# Patient Record
Sex: MALE | Race: WHITE | Employment: OTHER | URBAN - NONMETROPOLITAN AREA
[De-identification: names, ages, dates, MRNs, and addresses within clinical notes are randomized per-mention and may not be internally consistent; named-entity substitution may affect disease eponyms.]

---

## 2018-06-05 ENCOUNTER — HOSPITAL ENCOUNTER (INPATIENT)
Age: 68
LOS: 2 days | Discharge: HOME | DRG: 249 | End: 2018-06-07
Attending: INTERNAL MEDICINE | Admitting: INTERNAL MEDICINE
Payer: MEDICARE

## 2018-06-05 PROBLEM — I21.3 STEMI (ST ELEVATION MYOCARDIAL INFARCTION) (HCC): Status: ACTIVE | Noted: 2018-06-05

## 2018-06-05 PROBLEM — I25.10 CAD (CORONARY ARTERY DISEASE): Status: ACTIVE | Noted: 2018-06-05

## 2018-06-05 LAB
ANION GAP SERPL CALCULATED.3IONS-SCNC: 13 MMOL/L (ref 7–19)
BUN BLDV-MCNC: 13 MG/DL (ref 8–23)
CALCIUM SERPL-MCNC: 8.2 MG/DL (ref 8.8–10.2)
CHLORIDE BLD-SCNC: 98 MMOL/L (ref 98–111)
CO2: 27 MMOL/L (ref 22–29)
CREAT SERPL-MCNC: 0.9 MG/DL (ref 0.5–1.2)
GFR NON-AFRICAN AMERICAN: >60
GLUCOSE BLD-MCNC: 96 MG/DL (ref 74–109)
HCT VFR BLD CALC: 47.7 % (ref 42–52)
HEMOGLOBIN: 15.8 G/DL (ref 14–18)
MCH RBC QN AUTO: 30.6 PG (ref 27–31)
MCHC RBC AUTO-ENTMCNC: 33.1 G/DL (ref 33–37)
MCV RBC AUTO: 92.4 FL (ref 80–94)
PDW BLD-RTO: 16.8 % (ref 11.5–14.5)
PLATELET # BLD: 245 K/UL (ref 130–400)
PMV BLD AUTO: 8.9 FL (ref 9.4–12.4)
POTASSIUM SERPL-SCNC: 4.4 MMOL/L (ref 3.5–5)
RBC # BLD: 5.16 M/UL (ref 4.7–6.1)
SODIUM BLD-SCNC: 138 MMOL/L (ref 136–145)
TROPONIN: 0.3 NG/ML (ref 0–0.03)
TROPONIN: 0.38 NG/ML (ref 0–0.03)
TROPONIN: 0.5 NG/ML (ref 0–0.03)
WBC # BLD: 7.2 K/UL (ref 4.8–10.8)

## 2018-06-05 PROCEDURE — 6370000000 HC RX 637 (ALT 250 FOR IP): Performed by: INTERNAL MEDICINE

## 2018-06-05 PROCEDURE — 6360000002 HC RX W HCPCS

## 2018-06-05 PROCEDURE — 36415 COLL VENOUS BLD VENIPUNCTURE: CPT

## 2018-06-05 PROCEDURE — 85027 COMPLETE CBC AUTOMATED: CPT

## 2018-06-05 PROCEDURE — 2100000000 HC CCU R&B

## 2018-06-05 PROCEDURE — 02703EZ DILATION OF CORONARY ARTERY, ONE ARTERY WITH TWO INTRALUMINAL DEVICES, PERCUTANEOUS APPROACH: ICD-10-PCS | Performed by: INTERNAL MEDICINE

## 2018-06-05 PROCEDURE — 4A023N7 MEASUREMENT OF CARDIAC SAMPLING AND PRESSURE, LEFT HEART, PERCUTANEOUS APPROACH: ICD-10-PCS | Performed by: INTERNAL MEDICINE

## 2018-06-05 PROCEDURE — 87070 CULTURE OTHR SPECIMN AEROBIC: CPT

## 2018-06-05 PROCEDURE — 92941 PRQ TRLML REVSC TOT OCCL AMI: CPT | Performed by: INTERNAL MEDICINE

## 2018-06-05 PROCEDURE — B2111ZZ FLUOROSCOPY OF MULTIPLE CORONARY ARTERIES USING LOW OSMOLAR CONTRAST: ICD-10-PCS | Performed by: INTERNAL MEDICINE

## 2018-06-05 PROCEDURE — 6370000000 HC RX 637 (ALT 250 FOR IP)

## 2018-06-05 PROCEDURE — 6360000002 HC RX W HCPCS: Performed by: INTERNAL MEDICINE

## 2018-06-05 PROCEDURE — 94640 AIRWAY INHALATION TREATMENT: CPT

## 2018-06-05 PROCEDURE — 2700000000 HC OXYGEN THERAPY PER DAY

## 2018-06-05 PROCEDURE — 3E033PZ INTRODUCTION OF PLATELET INHIBITOR INTO PERIPHERAL VEIN, PERCUTANEOUS APPROACH: ICD-10-PCS | Performed by: INTERNAL MEDICINE

## 2018-06-05 PROCEDURE — 93005 ELECTROCARDIOGRAM TRACING: CPT

## 2018-06-05 PROCEDURE — 84484 ASSAY OF TROPONIN QUANT: CPT

## 2018-06-05 PROCEDURE — 2580000003 HC RX 258: Performed by: INTERNAL MEDICINE

## 2018-06-05 PROCEDURE — B2151ZZ FLUOROSCOPY OF LEFT HEART USING LOW OSMOLAR CONTRAST: ICD-10-PCS | Performed by: INTERNAL MEDICINE

## 2018-06-05 PROCEDURE — B41F1ZZ FLUOROSCOPY OF RIGHT LOWER EXTREMITY ARTERIES USING LOW OSMOLAR CONTRAST: ICD-10-PCS | Performed by: INTERNAL MEDICINE

## 2018-06-05 PROCEDURE — 93458 L HRT ARTERY/VENTRICLE ANGIO: CPT | Performed by: INTERNAL MEDICINE

## 2018-06-05 PROCEDURE — 80048 BASIC METABOLIC PNL TOTAL CA: CPT

## 2018-06-05 RX ORDER — ATORVASTATIN CALCIUM 80 MG/1
80 TABLET, FILM COATED ORAL NIGHTLY
Status: DISCONTINUED | OUTPATIENT
Start: 2018-06-05 | End: 2018-06-07 | Stop reason: HOSPADM

## 2018-06-05 RX ORDER — PRASUGREL 5 MG/1
5 TABLET, FILM COATED ORAL DAILY
Status: DISCONTINUED | OUTPATIENT
Start: 2018-06-05 | End: 2018-06-05

## 2018-06-05 RX ORDER — NICOTINE 21 MG/24HR
1 PATCH, TRANSDERMAL 24 HOURS TRANSDERMAL DAILY
Status: DISCONTINUED | OUTPATIENT
Start: 2018-06-05 | End: 2018-06-07 | Stop reason: HOSPADM

## 2018-06-05 RX ORDER — ACETAMINOPHEN 325 MG/1
650 TABLET ORAL EVERY 4 HOURS PRN
Status: DISCONTINUED | OUTPATIENT
Start: 2018-06-05 | End: 2018-06-07 | Stop reason: HOSPADM

## 2018-06-05 RX ORDER — ONDANSETRON 2 MG/ML
4 INJECTION INTRAMUSCULAR; INTRAVENOUS EVERY 6 HOURS PRN
Status: DISCONTINUED | OUTPATIENT
Start: 2018-06-05 | End: 2018-06-07 | Stop reason: HOSPADM

## 2018-06-05 RX ORDER — IPRATROPIUM BROMIDE AND ALBUTEROL SULFATE 2.5; .5 MG/3ML; MG/3ML
1 SOLUTION RESPIRATORY (INHALATION)
Status: DISCONTINUED | OUTPATIENT
Start: 2018-06-05 | End: 2018-06-07 | Stop reason: HOSPADM

## 2018-06-05 RX ORDER — PRASUGREL 10 MG/1
10 TABLET, FILM COATED ORAL DAILY
Status: DISCONTINUED | OUTPATIENT
Start: 2018-06-05 | End: 2018-06-07 | Stop reason: HOSPADM

## 2018-06-05 RX ORDER — SODIUM CHLORIDE 9 MG/ML
INJECTION, SOLUTION INTRAVENOUS CONTINUOUS
Status: ACTIVE | OUTPATIENT
Start: 2018-06-05 | End: 2018-06-05

## 2018-06-05 RX ORDER — ONDANSETRON 2 MG/ML
4 INJECTION INTRAMUSCULAR; INTRAVENOUS EVERY 6 HOURS PRN
Status: DISCONTINUED | OUTPATIENT
Start: 2018-06-05 | End: 2018-06-05 | Stop reason: SDUPTHER

## 2018-06-05 RX ORDER — ASPIRIN 81 MG/1
81 TABLET, CHEWABLE ORAL DAILY
Status: DISCONTINUED | OUTPATIENT
Start: 2018-06-05 | End: 2018-06-07 | Stop reason: HOSPADM

## 2018-06-05 RX ORDER — ASPIRIN 81 MG/1
81 TABLET, CHEWABLE ORAL DAILY
Status: DISCONTINUED | OUTPATIENT
Start: 2018-06-06 | End: 2018-06-05 | Stop reason: SDUPTHER

## 2018-06-05 RX ORDER — PRASUGREL 10 MG/1
10 TABLET, FILM COATED ORAL DAILY
Status: DISCONTINUED | OUTPATIENT
Start: 2018-06-06 | End: 2018-06-05

## 2018-06-05 RX ORDER — METOPROLOL SUCCINATE 25 MG/1
25 TABLET, EXTENDED RELEASE ORAL DAILY
Status: DISCONTINUED | OUTPATIENT
Start: 2018-06-05 | End: 2018-06-07 | Stop reason: HOSPADM

## 2018-06-05 RX ORDER — LISINOPRIL 5 MG/1
5 TABLET ORAL DAILY
Status: DISCONTINUED | OUTPATIENT
Start: 2018-06-05 | End: 2018-06-07 | Stop reason: HOSPADM

## 2018-06-05 RX ADMIN — ASPIRIN 81 MG CHEWABLE TABLET 81 MG: 81 TABLET CHEWABLE at 09:23

## 2018-06-05 RX ADMIN — BIVALIRUDIN 1 MG/KG/HR: 250 INJECTION, POWDER, LYOPHILIZED, FOR SOLUTION INTRAVENOUS at 05:15

## 2018-06-05 RX ADMIN — PRASUGREL 10 MG: 10 TABLET, FILM COATED ORAL at 09:23

## 2018-06-05 RX ADMIN — METOPROLOL SUCCINATE 25 MG: 25 TABLET, EXTENDED RELEASE ORAL at 09:23

## 2018-06-05 RX ADMIN — IPRATROPIUM BROMIDE AND ALBUTEROL SULFATE 1 AMPULE: .5; 3 SOLUTION RESPIRATORY (INHALATION) at 19:49

## 2018-06-05 RX ADMIN — ATORVASTATIN CALCIUM 80 MG: 80 TABLET, FILM COATED ORAL at 20:33

## 2018-06-05 RX ADMIN — IPRATROPIUM BROMIDE AND ALBUTEROL SULFATE 1 AMPULE: .5; 3 SOLUTION RESPIRATORY (INHALATION) at 16:57

## 2018-06-05 RX ADMIN — ENOXAPARIN SODIUM 40 MG: 100 INJECTION SUBCUTANEOUS at 09:24

## 2018-06-05 RX ADMIN — LISINOPRIL 5 MG: 5 TABLET ORAL at 09:23

## 2018-06-05 ASSESSMENT — PAIN SCALES - GENERAL
PAINLEVEL_OUTOF10: 0

## 2018-06-06 LAB
EKG P AXIS: 68 DEGREES
EKG P-R INTERVAL: 170 MS
EKG Q-T INTERVAL: 414 MS
EKG QRS DURATION: 102 MS
EKG QTC CALCULATION (BAZETT): 440 MS
EKG T AXIS: 74 DEGREES
MRSA CULTURE ONLY: NORMAL

## 2018-06-06 PROCEDURE — 94640 AIRWAY INHALATION TREATMENT: CPT

## 2018-06-06 PROCEDURE — 99233 SBSQ HOSP IP/OBS HIGH 50: CPT | Performed by: INTERNAL MEDICINE

## 2018-06-06 PROCEDURE — 6360000002 HC RX W HCPCS: Performed by: INTERNAL MEDICINE

## 2018-06-06 PROCEDURE — 6370000000 HC RX 637 (ALT 250 FOR IP): Performed by: INTERNAL MEDICINE

## 2018-06-06 PROCEDURE — 2100000000 HC CCU R&B

## 2018-06-06 PROCEDURE — 2700000000 HC OXYGEN THERAPY PER DAY

## 2018-06-06 RX ADMIN — LISINOPRIL 5 MG: 5 TABLET ORAL at 08:42

## 2018-06-06 RX ADMIN — IPRATROPIUM BROMIDE AND ALBUTEROL SULFATE 1 AMPULE: .5; 3 SOLUTION RESPIRATORY (INHALATION) at 06:15

## 2018-06-06 RX ADMIN — IPRATROPIUM BROMIDE AND ALBUTEROL SULFATE 1 AMPULE: .5; 3 SOLUTION RESPIRATORY (INHALATION) at 14:24

## 2018-06-06 RX ADMIN — ATORVASTATIN CALCIUM 80 MG: 80 TABLET, FILM COATED ORAL at 20:04

## 2018-06-06 RX ADMIN — IPRATROPIUM BROMIDE AND ALBUTEROL SULFATE 1 AMPULE: .5; 3 SOLUTION RESPIRATORY (INHALATION) at 19:45

## 2018-06-06 RX ADMIN — IPRATROPIUM BROMIDE AND ALBUTEROL SULFATE 1 AMPULE: .5; 3 SOLUTION RESPIRATORY (INHALATION) at 10:10

## 2018-06-06 RX ADMIN — ASPIRIN 81 MG CHEWABLE TABLET 81 MG: 81 TABLET CHEWABLE at 08:42

## 2018-06-06 RX ADMIN — ENOXAPARIN SODIUM 40 MG: 100 INJECTION SUBCUTANEOUS at 08:42

## 2018-06-06 RX ADMIN — METOPROLOL SUCCINATE 25 MG: 25 TABLET, EXTENDED RELEASE ORAL at 08:42

## 2018-06-06 RX ADMIN — PRASUGREL 10 MG: 10 TABLET, FILM COATED ORAL at 08:42

## 2018-06-06 ASSESSMENT — PAIN SCALES - GENERAL
PAINLEVEL_OUTOF10: 0

## 2018-06-07 VITALS
RESPIRATION RATE: 26 BRPM | WEIGHT: 186.2 LBS | TEMPERATURE: 97.2 F | HEIGHT: 72 IN | HEART RATE: 69 BPM | OXYGEN SATURATION: 94 % | BODY MASS INDEX: 25.22 KG/M2 | SYSTOLIC BLOOD PRESSURE: 116 MMHG | DIASTOLIC BLOOD PRESSURE: 76 MMHG

## 2018-06-07 LAB
CHOLESTEROL, TOTAL: 181 MG/DL (ref 160–199)
HDLC SERPL-MCNC: 50 MG/DL (ref 55–121)
LDL CHOLESTEROL CALCULATED: 111 MG/DL
TRIGL SERPL-MCNC: 98 MG/DL (ref 0–149)

## 2018-06-07 PROCEDURE — 36415 COLL VENOUS BLD VENIPUNCTURE: CPT

## 2018-06-07 PROCEDURE — 99238 HOSP IP/OBS DSCHRG MGMT 30/<: CPT | Performed by: INTERNAL MEDICINE

## 2018-06-07 PROCEDURE — 6360000002 HC RX W HCPCS: Performed by: INTERNAL MEDICINE

## 2018-06-07 PROCEDURE — 94640 AIRWAY INHALATION TREATMENT: CPT

## 2018-06-07 PROCEDURE — 6370000000 HC RX 637 (ALT 250 FOR IP): Performed by: INTERNAL MEDICINE

## 2018-06-07 PROCEDURE — 80061 LIPID PANEL: CPT

## 2018-06-07 RX ORDER — NICOTINE 21 MG/24HR
1 PATCH, TRANSDERMAL 24 HOURS TRANSDERMAL DAILY
Qty: 30 PATCH | Refills: 3 | Status: SHIPPED | OUTPATIENT
Start: 2018-06-08 | End: 2018-11-12

## 2018-06-07 RX ORDER — LISINOPRIL 5 MG/1
5 TABLET ORAL DAILY
Qty: 30 TABLET | Refills: 3 | Status: SHIPPED | OUTPATIENT
Start: 2018-06-08 | End: 2018-08-28 | Stop reason: SDUPTHER

## 2018-06-07 RX ORDER — METOPROLOL SUCCINATE 25 MG/1
25 TABLET, EXTENDED RELEASE ORAL DAILY
Qty: 30 TABLET | Refills: 3 | Status: SHIPPED | OUTPATIENT
Start: 2018-06-08 | End: 2018-08-28 | Stop reason: SDUPTHER

## 2018-06-07 RX ORDER — ASPIRIN 81 MG/1
81 TABLET, CHEWABLE ORAL DAILY
Qty: 30 TABLET | Refills: 3 | Status: SHIPPED | OUTPATIENT
Start: 2018-06-08

## 2018-06-07 RX ORDER — ATORVASTATIN CALCIUM 80 MG/1
80 TABLET, FILM COATED ORAL NIGHTLY
Qty: 30 TABLET | Refills: 3 | Status: SHIPPED | OUTPATIENT
Start: 2018-06-07 | End: 2018-08-28 | Stop reason: SDUPTHER

## 2018-06-07 RX ORDER — PRASUGREL 10 MG/1
10 TABLET, FILM COATED ORAL DAILY
Qty: 30 TABLET | Refills: 2 | Status: SHIPPED | OUTPATIENT
Start: 2018-06-08 | End: 2018-08-28 | Stop reason: SDUPTHER

## 2018-06-07 RX ORDER — IPRATROPIUM BROMIDE AND ALBUTEROL SULFATE 2.5; .5 MG/3ML; MG/3ML
3 SOLUTION RESPIRATORY (INHALATION)
Qty: 360 ML | Refills: 2 | Status: SHIPPED | OUTPATIENT
Start: 2018-06-07

## 2018-06-07 RX ADMIN — IPRATROPIUM BROMIDE AND ALBUTEROL SULFATE 1 AMPULE: .5; 3 SOLUTION RESPIRATORY (INHALATION) at 10:48

## 2018-06-07 RX ADMIN — ENOXAPARIN SODIUM 40 MG: 100 INJECTION SUBCUTANEOUS at 10:33

## 2018-06-07 RX ADMIN — IPRATROPIUM BROMIDE AND ALBUTEROL SULFATE 1 AMPULE: .5; 3 SOLUTION RESPIRATORY (INHALATION) at 14:58

## 2018-06-07 RX ADMIN — LISINOPRIL 5 MG: 5 TABLET ORAL at 10:34

## 2018-06-07 RX ADMIN — ASPIRIN 81 MG CHEWABLE TABLET 81 MG: 81 TABLET CHEWABLE at 10:34

## 2018-06-07 RX ADMIN — METOPROLOL SUCCINATE 25 MG: 25 TABLET, EXTENDED RELEASE ORAL at 10:34

## 2018-06-07 RX ADMIN — PRASUGREL 10 MG: 10 TABLET, FILM COATED ORAL at 10:34

## 2018-06-07 RX ADMIN — IPRATROPIUM BROMIDE AND ALBUTEROL SULFATE 1 AMPULE: .5; 3 SOLUTION RESPIRATORY (INHALATION) at 07:05

## 2018-06-07 ASSESSMENT — PAIN SCALES - GENERAL
PAINLEVEL_OUTOF10: 0
PAINLEVEL_OUTOF10: 0

## 2018-06-08 LAB
LV EF: 40 %
LVEF MODALITY: NORMAL

## 2018-06-27 ENCOUNTER — OFFICE VISIT (OUTPATIENT)
Dept: CARDIOLOGY | Age: 68
End: 2018-06-27
Payer: MEDICARE

## 2018-06-27 VITALS
SYSTOLIC BLOOD PRESSURE: 112 MMHG | HEIGHT: 72 IN | HEART RATE: 82 BPM | BODY MASS INDEX: 25.47 KG/M2 | DIASTOLIC BLOOD PRESSURE: 78 MMHG | WEIGHT: 188 LBS

## 2018-06-27 DIAGNOSIS — I25.110 CORONARY ARTERY DISEASE INVOLVING NATIVE HEART WITH UNSTABLE ANGINA PECTORIS, UNSPECIFIED VESSEL OR LESION TYPE (HCC): Primary | ICD-10-CM

## 2018-06-27 PROCEDURE — 3017F COLORECTAL CA SCREEN DOC REV: CPT | Performed by: NURSE PRACTITIONER

## 2018-06-27 PROCEDURE — G8419 CALC BMI OUT NRM PARAM NOF/U: HCPCS | Performed by: NURSE PRACTITIONER

## 2018-06-27 PROCEDURE — 4040F PNEUMOC VAC/ADMIN/RCVD: CPT | Performed by: NURSE PRACTITIONER

## 2018-06-27 PROCEDURE — 93000 ELECTROCARDIOGRAM COMPLETE: CPT | Performed by: NURSE PRACTITIONER

## 2018-06-27 PROCEDURE — G8598 ASA/ANTIPLAT THER USED: HCPCS | Performed by: NURSE PRACTITIONER

## 2018-06-27 PROCEDURE — 99214 OFFICE O/P EST MOD 30 MIN: CPT | Performed by: NURSE PRACTITIONER

## 2018-06-27 PROCEDURE — 4004F PT TOBACCO SCREEN RCVD TLK: CPT | Performed by: NURSE PRACTITIONER

## 2018-06-27 PROCEDURE — G8427 DOCREV CUR MEDS BY ELIG CLIN: HCPCS | Performed by: NURSE PRACTITIONER

## 2018-06-27 PROCEDURE — 1111F DSCHRG MED/CURRENT MED MERGE: CPT | Performed by: NURSE PRACTITIONER

## 2018-06-27 PROCEDURE — 1123F ACP DISCUSS/DSCN MKR DOCD: CPT | Performed by: NURSE PRACTITIONER

## 2018-06-27 RX ORDER — NITROGLYCERIN 0.4 MG/1
0.4 TABLET SUBLINGUAL EVERY 5 MIN PRN
Qty: 25 TABLET | Refills: 3 | Status: SHIPPED | OUTPATIENT
Start: 2018-06-27 | End: 2018-08-28 | Stop reason: SDUPTHER

## 2018-07-06 ENCOUNTER — HOSPITAL ENCOUNTER (OUTPATIENT)
Dept: PULMONOLOGY | Age: 68
Discharge: HOME | End: 2018-07-06
Payer: MEDICARE

## 2018-07-06 ENCOUNTER — TELEPHONE (OUTPATIENT)
Dept: CARDIOLOGY | Age: 68
End: 2018-07-06

## 2018-07-06 DIAGNOSIS — I25.110 CORONARY ARTERY DISEASE INVOLVING NATIVE HEART WITH UNSTABLE ANGINA PECTORIS, UNSPECIFIED VESSEL OR LESION TYPE (HCC): ICD-10-CM

## 2018-07-06 LAB
BASE EXCESS ARTERIAL: -2.1 MMOL/L (ref -2–2)
CARBOXYHEMOGLOBIN ARTERIAL: 12.5 % (ref 0–5)
HCO3 ARTERIAL: 21.2 MMOL/L (ref 22–26)
HEMOGLOBIN, ART, EXTENDED: 15.8 G/DL (ref 14–18)
METHEMOGLOBIN ARTERIAL: 1.3 %
O2 CONTENT ARTERIAL: 18.9 ML/DL
O2 SAT, ARTERIAL: 84.8 %
O2 THERAPY: ABNORMAL
PCO2 ARTERIAL: 32 MMHG (ref 35–45)
PH ARTERIAL: 7.43 (ref 7.35–7.45)
PO2 ARTERIAL: 85 MMHG (ref 80–100)
POTASSIUM, WHOLE BLOOD: 4.5

## 2018-07-06 PROCEDURE — 36600 WITHDRAWAL OF ARTERIAL BLOOD: CPT

## 2018-07-06 PROCEDURE — 94727 GAS DIL/WSHOT DETER LNG VOL: CPT

## 2018-07-06 PROCEDURE — 94729 DIFFUSING CAPACITY: CPT

## 2018-07-06 PROCEDURE — 6360000002 HC RX W HCPCS: Performed by: NURSE PRACTITIONER

## 2018-07-06 PROCEDURE — 84132 ASSAY OF SERUM POTASSIUM: CPT

## 2018-07-06 PROCEDURE — 82803 BLOOD GASES ANY COMBINATION: CPT

## 2018-07-06 PROCEDURE — 94060 EVALUATION OF WHEEZING: CPT

## 2018-07-06 RX ORDER — ALBUTEROL SULFATE 2.5 MG/3ML
2.5 SOLUTION RESPIRATORY (INHALATION) EVERY 6 HOURS PRN
Status: DISCONTINUED | OUTPATIENT
Start: 2018-07-06 | End: 2018-07-08 | Stop reason: HOSPADM

## 2018-07-06 RX ADMIN — ALBUTEROL SULFATE 2.5 MG: 2.5 SOLUTION RESPIRATORY (INHALATION) at 09:46

## 2018-07-06 NOTE — TELEPHONE ENCOUNTER
[facility] called with abnormal ABG results that genoveva casey order. ABG-84.8, carboxy 12.5 --normal is less than 5. Lab is holding pt till they hear from you.  Lab number is [phone]

## 2018-07-08 NOTE — PROCEDURES
[facility] [address], [facility]                                PULMONARY FUNCTION    PATIENT NAME: Mary Norman               :        1950  MED REC NO:   [id number]                              ROOM:  ACCOUNT NO:   [de-identified]                           ADMIT DATE: 2018  PROVIDER:     Shravan Woods MD    DATE OF PROCEDURE:  2018    PULMONARY FUNCTION TESTS:  1. Spirometry shows severe airflow obstruction. 2.  Following bronchodilator, there is a significant percentage increase,  but insignificant absolute value increased in the FEV1 to 25% predicted  leaving residual, very severe airflow obstruction. 3.  Mid flows are severely reduced. 4.  Maximum ventilation is reduced. 5.  Lung volume measurements show mild reduction in total lung capacity and  vital capacity consistent with coexisting restrictive process, an elevation  of residual volume relative to these suggesting some gas trapping. 6.  Diffusion capacity is severely reduced.         Sarah Olivares MD    D: 2018 11:53:29      T: 2018 14:34:43     [id number]  Job#: [id number]     Doc#: [id number]    CC:

## 2018-07-09 DIAGNOSIS — R94.2 ABNORMAL PFT: Primary | ICD-10-CM

## 2018-07-10 ENCOUNTER — OFFICE VISIT (OUTPATIENT)
Dept: CARDIOTHORACIC SURGERY | Age: 68
End: 2018-07-10
Payer: MEDICARE

## 2018-07-10 VITALS
BODY MASS INDEX: 25.33 KG/M2 | SYSTOLIC BLOOD PRESSURE: 126 MMHG | HEIGHT: 72 IN | WEIGHT: 187 LBS | HEART RATE: 86 BPM | DIASTOLIC BLOOD PRESSURE: 80 MMHG | OXYGEN SATURATION: 99 %

## 2018-07-10 DIAGNOSIS — I25.10 ATHEROSCLEROSIS OF NATIVE CORONARY ARTERY OF NATIVE HEART WITHOUT ANGINA PECTORIS: Primary | ICD-10-CM

## 2018-07-10 PROCEDURE — 3017F COLORECTAL CA SCREEN DOC REV: CPT | Performed by: THORACIC SURGERY (CARDIOTHORACIC VASCULAR SURGERY)

## 2018-07-10 PROCEDURE — 1101F PT FALLS ASSESS-DOCD LE1/YR: CPT | Performed by: THORACIC SURGERY (CARDIOTHORACIC VASCULAR SURGERY)

## 2018-07-10 PROCEDURE — 99204 OFFICE O/P NEW MOD 45 MIN: CPT | Performed by: THORACIC SURGERY (CARDIOTHORACIC VASCULAR SURGERY)

## 2018-07-10 PROCEDURE — G8427 DOCREV CUR MEDS BY ELIG CLIN: HCPCS | Performed by: THORACIC SURGERY (CARDIOTHORACIC VASCULAR SURGERY)

## 2018-07-10 PROCEDURE — 1123F ACP DISCUSS/DSCN MKR DOCD: CPT | Performed by: THORACIC SURGERY (CARDIOTHORACIC VASCULAR SURGERY)

## 2018-07-10 PROCEDURE — G8419 CALC BMI OUT NRM PARAM NOF/U: HCPCS | Performed by: THORACIC SURGERY (CARDIOTHORACIC VASCULAR SURGERY)

## 2018-07-10 PROCEDURE — 4004F PT TOBACCO SCREEN RCVD TLK: CPT | Performed by: THORACIC SURGERY (CARDIOTHORACIC VASCULAR SURGERY)

## 2018-07-10 PROCEDURE — 4040F PNEUMOC VAC/ADMIN/RCVD: CPT | Performed by: THORACIC SURGERY (CARDIOTHORACIC VASCULAR SURGERY)

## 2018-07-10 PROCEDURE — G8598 ASA/ANTIPLAT THER USED: HCPCS | Performed by: THORACIC SURGERY (CARDIOTHORACIC VASCULAR SURGERY)

## 2018-07-11 NOTE — PROGRESS NOTES
Date    CAD (coronary artery disease)     COPD (chronic obstructive pulmonary disease) (HCC)      Past Surgical History:   Procedure Laterality Date    APPENDECTOMY      BACK SURGERY      COLONOSCOPY      ENDOSCOPY, COLON, DIAGNOSTIC       Current Outpatient Prescriptions   Medication Sig Dispense Refill    nitroGLYCERIN (NITROSTAT) 0.4 MG SL tablet Place 1 tablet under the tongue every 5 minutes as needed for Chest pain 25 tablet 3    aspirin 81 MG chewable tablet Take 1 tablet by mouth daily 30 tablet 3    ipratropium-albuterol (DUONEB) 0.5-2.5 (3) MG/3ML SOLN nebulizer solution Inhale 3 mLs into the lungs every 4 hours (while awake) 360 mL 2    atorvastatin (LIPITOR) 80 MG tablet Take 1 tablet by mouth nightly 30 tablet 3    lisinopril (PRINIVIL;ZESTRIL) 5 MG tablet Take 1 tablet by mouth daily 30 tablet 3    metoprolol succinate (TOPROL XL) 25 MG extended release tablet Take 1 tablet by mouth daily 30 tablet 3    prasugrel (EFFIENT) 10 MG TABS Take 1 tablet by mouth daily 30 tablet 2    nicotine (NICODERM CQ) 21 MG/24HR Place 1 patch onto the skin daily 30 patch 3     No current facility-administered medications for this visit. No Known Allergies  Family History   Problem Relation Age of Onset    Cancer Sister     Cancer Brother      Social History     Social History    Marital status:      Spouse name: N/A    Number of children: N/A    Years of education: N/A     Occupational History    Not on file. Social History Main Topics    Smoking status: Current Every Day Smoker     Packs/day: 1.00     Years: 50.00     Types: Cigarettes    Smokeless tobacco: Never Used    Alcohol use Yes      Comment: occassional    Drug use: No    Sexual activity: Not on file     Other Topics Concern    Not on file     Social History Narrative    No narrative on file         ROS:   HEENT denies neck pain, sore throat, blurred vision, diplopia.     Respiratory chronic shortness of breath, worse

## 2018-08-28 ENCOUNTER — OFFICE VISIT (OUTPATIENT)
Dept: CARDIOLOGY | Age: 68
End: 2018-08-28
Payer: MEDICARE

## 2018-08-28 VITALS
BODY MASS INDEX: 26.14 KG/M2 | SYSTOLIC BLOOD PRESSURE: 118 MMHG | HEIGHT: 72 IN | WEIGHT: 193 LBS | DIASTOLIC BLOOD PRESSURE: 80 MMHG | HEART RATE: 80 BPM

## 2018-08-28 DIAGNOSIS — I21.01 ST ELEVATION MYOCARDIAL INFARCTION INVOLVING LEFT MAIN CORONARY ARTERY (HCC): Primary | ICD-10-CM

## 2018-08-28 DIAGNOSIS — I25.10 CORONARY ARTERY DISEASE INVOLVING NATIVE CORONARY ARTERY OF NATIVE HEART WITHOUT ANGINA PECTORIS: ICD-10-CM

## 2018-08-28 PROCEDURE — G8419 CALC BMI OUT NRM PARAM NOF/U: HCPCS | Performed by: NURSE PRACTITIONER

## 2018-08-28 PROCEDURE — G8598 ASA/ANTIPLAT THER USED: HCPCS | Performed by: NURSE PRACTITIONER

## 2018-08-28 PROCEDURE — 3017F COLORECTAL CA SCREEN DOC REV: CPT | Performed by: NURSE PRACTITIONER

## 2018-08-28 PROCEDURE — 1101F PT FALLS ASSESS-DOCD LE1/YR: CPT | Performed by: NURSE PRACTITIONER

## 2018-08-28 PROCEDURE — 99213 OFFICE O/P EST LOW 20 MIN: CPT | Performed by: NURSE PRACTITIONER

## 2018-08-28 PROCEDURE — 1123F ACP DISCUSS/DSCN MKR DOCD: CPT | Performed by: NURSE PRACTITIONER

## 2018-08-28 PROCEDURE — 4040F PNEUMOC VAC/ADMIN/RCVD: CPT | Performed by: NURSE PRACTITIONER

## 2018-08-28 PROCEDURE — 4004F PT TOBACCO SCREEN RCVD TLK: CPT | Performed by: NURSE PRACTITIONER

## 2018-08-28 PROCEDURE — G8427 DOCREV CUR MEDS BY ELIG CLIN: HCPCS | Performed by: NURSE PRACTITIONER

## 2018-08-28 RX ORDER — METOPROLOL SUCCINATE 25 MG/1
25 TABLET, EXTENDED RELEASE ORAL DAILY
Qty: 30 TABLET | Refills: 3 | Status: SHIPPED | OUTPATIENT
Start: 2018-08-28 | End: 2018-12-20 | Stop reason: SDUPTHER

## 2018-08-28 RX ORDER — LISINOPRIL 5 MG/1
5 TABLET ORAL DAILY
Qty: 30 TABLET | Refills: 3 | Status: SHIPPED | OUTPATIENT
Start: 2018-08-28 | End: 2018-12-20 | Stop reason: SDUPTHER

## 2018-08-28 RX ORDER — PRASUGREL 10 MG/1
10 TABLET, FILM COATED ORAL DAILY
Qty: 30 TABLET | Refills: 2 | Status: SHIPPED | OUTPATIENT
Start: 2018-08-28 | End: 2018-11-12 | Stop reason: SDUPTHER

## 2018-08-28 RX ORDER — ATORVASTATIN CALCIUM 80 MG/1
80 TABLET, FILM COATED ORAL NIGHTLY
Qty: 30 TABLET | Refills: 3 | Status: SHIPPED | OUTPATIENT
Start: 2018-08-28 | End: 2018-12-20 | Stop reason: SDUPTHER

## 2018-08-28 RX ORDER — NITROGLYCERIN 0.4 MG/1
0.4 TABLET SUBLINGUAL EVERY 5 MIN PRN
Qty: 25 TABLET | Refills: 3 | Status: SHIPPED | OUTPATIENT
Start: 2018-08-28

## 2018-08-28 NOTE — PROGRESS NOTES
Dear Drs. No ref. provider found & Compa Bryant MD,    Thank you for allowing me to participate in the care of Mr. Ajay Fuentes. He presents today at the [address] [place] [address] in the [facility]. As you know, Mr. Patrick Donahue is a 79 y.o. male with history of COPD and CAD who presents with the chief complaint of 2 month follow up. He is a patient of Dr. Sharad Barahona. Mr. Patrick Donahue has hx of stents placed in 2000 and never sought follow up. He then was transferred to [facility] in June with anterior MI. He underwent heart cath that revealed a 99% long diffuse lesion of the mid LAD treated with two BMS. He was placed on DAPT for one month. He was also noted to have diffuse severe multi vessel CAD with the distal left main, entire LAD, CX and RCA vessels. He followed up in the office and was noted to have SOA at rest. He reported to the NP that he was a heavy smoker down to 3/4 PPD and prior to this recent stents he was well over a pack. He tells me today that he quit cigarettes 10 years ago and just smoked cigars which he has quit as of 7/10/18 on the day he was seen by Dr. Johny Giron. He was sent to Dr. Johny Giron following his office visit and PFTs. He reported to Dr. Johny Giron he was smoking 2 PPD since age 15. Dr. Johny Giron reviewed his ABG/ PFT results with patient. He was noted to have severe respiratory distress with evidence of carbon monoxide toxicity from cigarette smoking. Dr. Johny Giron recommended hospitalization to treat his carbon monoxide toxicity and complete smoking cessation and he refused. He determined him not to be a surgical candidate at this time. He saw Dr. Heather Shine- did some tests to check to see if needed oxygen but he did not qualify. He was given some samples of inhalers. He follows up with him in November. He continues to be SOB at rest. He has had to take SL NTG about 3 times since June and this has helped his chest pain.      He otherwise denies chest pain, PND, syncope or near syncope. He has no other complaints. Review of Systems    Constitutional: Negative for fever, chills, diaphoresis, activity change, appetite change, fatigue and unexpected weight change. Eyes: Negative for photophobia, pain, redness and visual disturbance. Respiratory: Negative for apnea, cough, chest tightness, +shortness of breath, wheezing and stridor. Cardiovascular: Negative for palpitations and leg swelling. +chest pain  Gastrointestinal: Negative for abdominal distention. Genitourinary: Negative for dysuria, urgency and frequency. Musculoskeletal: Negative for myalgias, arthralgias and gait problem. Skin: Negative for color change, pallor, rash and wound. Neurological: Negative for dizziness, tremors, speech difficulty, weakness and numbness. Hematological: Does not bruise/bleed easily. Psychiatric/Behavioral: Negative. Past Medical History:   Diagnosis Date    CAD (coronary artery disease)     COPD (chronic obstructive pulmonary disease) ([facility] [address]        Past Surgical History:   Procedure Laterality Date    APPENDECTOMY      BACK SURGERY      COLONOSCOPY      ENDOSCOPY, COLON, DIAGNOSTIC         Family History   Problem Relation Age of Onset    Cancer Sister     Cancer Brother        Social History     Social History    Marital status:      Spouse name: N/A    Number of children: N/A    Years of education: N/A     Occupational History    Not on file.      Social History Main Topics    Smoking status: Current Every Day Smoker     Packs/day: 1.00     Years: 50.00     Types: Cigarettes    Smokeless tobacco: Never Used    Alcohol use Yes      Comment: occassional    Drug use: No    Sexual activity: Not on file     Other Topics Concern    Not on file     Social History Narrative    No narrative on file       No Known Allergies      Current Outpatient Prescriptions:     atorvastatin (LIPITOR) 80 MG tablet, Take 1 tablet by June 2018  Summary:       1. Successful femoral artery ultrasound  2. Successful femoral artery arterogram  3. Diffuse severe multiple vessel coronary artery disease involving the distal left main, entire LAD, circumflex and RCA vessels  4. Left ventricular function is impaired in the anterior lateral wall with a visually estimated ejection fraction of 40%   5.  99% long diffuse lesion in the mid LAD  6. Successful percutaneous coronary intervention utilizing a two bare metal stents to the mid LAD.        RECOMMENDATIONS:     1. Risk Factor Modification  2. On-going Medical Therapy  3. Dual antiplatelet therapy for one months.        Electronically signed by Gary Muller MD on 6/5/18 at 4:42 AM    Assessment    1. ST elevation myocardial infarction involving left main coronary artery ([facility] [address]    2. Coronary artery disease involving native coronary artery of native heart without angina pectoris          Plan:    He has assured me he is no longer smoking. Dr. Ngo Service has placed him on inhalers. He is on ASA, Statin, AceI, BB. And Effient. I have left him on the Effient at this time. I will inform Dr. Lyndon Ames that at this time Dr. Ramesh Panchal does not feel he is a surgical candidate. He is advised to continue to follow up with Dr. Jeni Chauhan and to continue to no longer smoke. Disposition - RTC in 2 months with Dr. Guillermina Ibrahim  or sooner if needed    Please do not hesitate to contact me for any questions or concerns.     Sincerely yours,    CHALO Hernandez

## 2018-11-12 ENCOUNTER — OFFICE VISIT (OUTPATIENT)
Dept: CARDIOLOGY | Age: 68
End: 2018-11-12
Payer: MEDICARE

## 2018-11-12 VITALS
DIASTOLIC BLOOD PRESSURE: 80 MMHG | WEIGHT: 186 LBS | HEIGHT: 72 IN | HEART RATE: 75 BPM | BODY MASS INDEX: 25.19 KG/M2 | SYSTOLIC BLOOD PRESSURE: 110 MMHG

## 2018-11-12 DIAGNOSIS — R07.9 CHEST PAIN, UNSPECIFIED TYPE: ICD-10-CM

## 2018-11-12 DIAGNOSIS — I25.10 CORONARY ARTERY DISEASE INVOLVING NATIVE CORONARY ARTERY OF NATIVE HEART WITHOUT ANGINA PECTORIS: Primary | ICD-10-CM

## 2018-11-12 PROCEDURE — 99213 OFFICE O/P EST LOW 20 MIN: CPT | Performed by: NURSE PRACTITIONER

## 2018-11-12 PROCEDURE — 93000 ELECTROCARDIOGRAM COMPLETE: CPT | Performed by: NURSE PRACTITIONER

## 2018-11-12 RX ORDER — ISOSORBIDE MONONITRATE 30 MG/1
30 TABLET, EXTENDED RELEASE ORAL DAILY
COMMUNITY
End: 2018-11-13 | Stop reason: SDUPTHER

## 2018-11-12 RX ORDER — PRASUGREL 10 MG/1
10 TABLET, FILM COATED ORAL DAILY
Qty: 30 TABLET | Refills: 3 | Status: SHIPPED | OUTPATIENT
Start: 2018-11-12 | End: 2019-04-04 | Stop reason: SDUPTHER

## 2018-11-12 NOTE — PROGRESS NOTES
change, pallor, rash and wound. Neurological: Negative for tremors, speech difficulty, weakness and numbness. + dizziness,  Hematological: Does not bruise/bleed easily. Psychiatric/Behavioral: Negative. Past Medical History:   Diagnosis Date    CAD (coronary artery disease)     Chest pain 11/14/2018    COPD (chronic obstructive pulmonary disease) (HCC)        Past Surgical History:   Procedure Laterality Date    APPENDECTOMY      BACK SURGERY      COLONOSCOPY      ENDOSCOPY, COLON, DIAGNOSTIC         Family History   Problem Relation Age of Onset    Cancer Sister     Cancer Brother        Social History     Social History    Marital status:      Spouse name: N/A    Number of children: N/A    Years of education: N/A     Occupational History    Not on file.      Social History Main Topics    Smoking status: Former Smoker     Packs/day: 1.00     Years: 50.00     Types: Cigarettes    Smokeless tobacco: Never Used    Alcohol use Yes      Comment: occassional    Drug use: No    Sexual activity: Not on file     Other Topics Concern    Not on file     Social History Narrative    No narrative on file       No Known Allergies      Current Outpatient Prescriptions:     prasugrel (EFFIENT) 10 MG TABS, Take 1 tablet by mouth daily, Disp: 30 tablet, Rfl: 3    atorvastatin (LIPITOR) 80 MG tablet, Take 1 tablet by mouth nightly, Disp: 30 tablet, Rfl: 3    lisinopril (PRINIVIL;ZESTRIL) 5 MG tablet, Take 1 tablet by mouth daily, Disp: 30 tablet, Rfl: 3    metoprolol succinate (TOPROL XL) 25 MG extended release tablet, Take 1 tablet by mouth daily, Disp: 30 tablet, Rfl: 3    nitroGLYCERIN (NITROSTAT) 0.4 MG SL tablet, Place 1 tablet under the tongue every 5 minutes as needed for Chest pain, Disp: 25 tablet, Rfl: 3    aspirin 81 MG chewable tablet, Take 1 tablet by mouth daily, Disp: 30 tablet, Rfl: 3    ipratropium-albuterol (DUONEB) 0.5-2.5 (3) MG/3ML SOLN nebulizer solution, Inhale 3 mLs

## 2018-11-13 ENCOUNTER — TELEPHONE (OUTPATIENT)
Dept: CARDIOLOGY | Age: 68
End: 2018-11-13

## 2018-11-13 DIAGNOSIS — I25.118 CORONARY ARTERY DISEASE OF NATIVE HEART WITH STABLE ANGINA PECTORIS, UNSPECIFIED VESSEL OR LESION TYPE (HCC): Primary | ICD-10-CM

## 2018-11-13 RX ORDER — ISOSORBIDE MONONITRATE 30 MG/1
30 TABLET, EXTENDED RELEASE ORAL DAILY
Qty: 30 TABLET | Refills: 3 | Status: SHIPPED | OUTPATIENT
Start: 2018-11-13 | End: 2019-02-11 | Stop reason: SDUPTHER

## 2018-11-14 PROBLEM — R07.9 CHEST PAIN: Status: ACTIVE | Noted: 2018-11-14

## 2018-12-20 RX ORDER — ATORVASTATIN CALCIUM 80 MG/1
80 TABLET, FILM COATED ORAL NIGHTLY
Qty: 30 TABLET | Refills: 3 | Status: SHIPPED | OUTPATIENT
Start: 2018-12-20 | End: 2019-05-13 | Stop reason: SDUPTHER

## 2018-12-20 RX ORDER — METOPROLOL SUCCINATE 25 MG/1
25 TABLET, EXTENDED RELEASE ORAL DAILY
Qty: 30 TABLET | Refills: 3 | Status: SHIPPED | OUTPATIENT
Start: 2018-12-20 | End: 2019-04-27 | Stop reason: SDUPTHER

## 2018-12-20 RX ORDER — LISINOPRIL 5 MG/1
5 TABLET ORAL DAILY
Qty: 30 TABLET | Refills: 3 | Status: SHIPPED | OUTPATIENT
Start: 2018-12-20 | End: 2019-04-27 | Stop reason: SDUPTHER

## 2019-02-11 ENCOUNTER — OFFICE VISIT (OUTPATIENT)
Dept: CARDIOLOGY | Age: 69
End: 2019-02-11
Payer: MEDICARE

## 2019-02-11 VITALS — DIASTOLIC BLOOD PRESSURE: 78 MMHG | SYSTOLIC BLOOD PRESSURE: 124 MMHG | HEART RATE: 76 BPM

## 2019-02-11 DIAGNOSIS — R07.9 CHEST PAIN, UNSPECIFIED TYPE: ICD-10-CM

## 2019-02-11 DIAGNOSIS — I25.118 CORONARY ARTERY DISEASE OF NATIVE HEART WITH STABLE ANGINA PECTORIS, UNSPECIFIED VESSEL OR LESION TYPE (HCC): Primary | ICD-10-CM

## 2019-02-11 PROCEDURE — 99212 OFFICE O/P EST SF 10 MIN: CPT | Performed by: NURSE PRACTITIONER

## 2019-02-11 RX ORDER — FUROSEMIDE 20 MG/1
20 TABLET ORAL DAILY PRN
Qty: 90 TABLET | Refills: 1 | Status: SHIPPED | OUTPATIENT
Start: 2019-02-11 | End: 2021-11-29 | Stop reason: SDUPTHER

## 2019-02-11 RX ORDER — ISOSORBIDE MONONITRATE 30 MG/1
120 TABLET, EXTENDED RELEASE ORAL DAILY
Qty: 30 TABLET | Refills: 3 | Status: ON HOLD | OUTPATIENT
Start: 2019-02-11 | End: 2019-04-17 | Stop reason: CLARIF

## 2019-04-04 DIAGNOSIS — I25.10 CORONARY ARTERY DISEASE INVOLVING NATIVE CORONARY ARTERY OF NATIVE HEART WITHOUT ANGINA PECTORIS: ICD-10-CM

## 2019-04-08 RX ORDER — PRASUGREL 10 MG/1
10 TABLET, FILM COATED ORAL DAILY
Qty: 30 TABLET | Refills: 0 | Status: SHIPPED | OUTPATIENT
Start: 2019-04-08 | End: 2019-05-29 | Stop reason: SDUPTHER

## 2019-04-17 ENCOUNTER — APPOINTMENT (OUTPATIENT)
Dept: GENERAL RADIOLOGY | Age: 69
End: 2019-04-17
Payer: OTHER MISCELLANEOUS

## 2019-04-17 ENCOUNTER — APPOINTMENT (OUTPATIENT)
Dept: CT IMAGING | Age: 69
End: 2019-04-17
Payer: OTHER MISCELLANEOUS

## 2019-04-17 ENCOUNTER — HOSPITAL ENCOUNTER (OUTPATIENT)
Age: 69
Setting detail: OBSERVATION
Discharge: HOME | End: 2019-04-18
Attending: FAMILY MEDICINE | Admitting: INTERNAL MEDICINE
Payer: OTHER MISCELLANEOUS

## 2019-04-17 DIAGNOSIS — R09.02 HYPOXIA: ICD-10-CM

## 2019-04-17 DIAGNOSIS — I95.1 ORTHOSTATIC HYPOTENSION: ICD-10-CM

## 2019-04-17 DIAGNOSIS — R06.09 DYSPNEA ON EXERTION: Primary | ICD-10-CM

## 2019-04-17 PROBLEM — R55 SYNCOPE AND COLLAPSE: Status: ACTIVE | Noted: 2019-04-17

## 2019-04-17 LAB
ALBUMIN SERPL-MCNC: 4.1 G/DL (ref 3.5–5.2)
ALP BLD-CCNC: 86 U/L (ref 40–130)
ALT SERPL-CCNC: 13 U/L (ref 5–41)
AMPHETAMINE SCREEN, URINE: NEGATIVE
ANION GAP SERPL CALCULATED.3IONS-SCNC: 14 MMOL/L (ref 7–19)
AST SERPL-CCNC: 16 U/L (ref 5–40)
BARBITURATE SCREEN URINE: NEGATIVE
BASE EXCESS ARTERIAL: -0.3 MMOL/L (ref -2–2)
BASOPHILS ABSOLUTE: 0.1 K/UL (ref 0–0.2)
BASOPHILS RELATIVE PERCENT: 1.2 % (ref 0–1)
BENZODIAZEPINE SCREEN, URINE: NEGATIVE
BILIRUB SERPL-MCNC: 0.5 MG/DL (ref 0.2–1.2)
BILIRUBIN URINE: NEGATIVE
BLOOD, URINE: NEGATIVE
BUN BLDV-MCNC: 20 MG/DL (ref 8–23)
CALCIUM SERPL-MCNC: 9.4 MG/DL (ref 8.8–10.2)
CANNABINOID SCREEN URINE: NEGATIVE
CARBOXYHEMOGLOBIN ARTERIAL: 1.2 % (ref 0–5)
CHLORIDE BLD-SCNC: 100 MMOL/L (ref 98–111)
CLARITY: CLEAR
CO2: 24 MMOL/L (ref 22–29)
COCAINE METABOLITE SCREEN URINE: NEGATIVE
COLOR: YELLOW
CREAT SERPL-MCNC: 1.2 MG/DL (ref 0.5–1.2)
EOSINOPHILS ABSOLUTE: 0.4 K/UL (ref 0–0.6)
EOSINOPHILS RELATIVE PERCENT: 4.6 % (ref 0–5)
ETHANOL: <10 MG/DL (ref 0–0.08)
GFR NON-AFRICAN AMERICAN: >60
GLUCOSE BLD-MCNC: 102 MG/DL (ref 74–109)
GLUCOSE URINE: NEGATIVE MG/DL
HCO3 ARTERIAL: 20.1 MMOL/L (ref 22–26)
HCT VFR BLD CALC: 44.2 % (ref 42–52)
HEMOGLOBIN, ART, EXTENDED: 14.7 G/DL (ref 14–18)
HEMOGLOBIN: 14.3 G/DL (ref 14–18)
KETONES, URINE: NEGATIVE MG/DL
LEUKOCYTE ESTERASE, URINE: NEGATIVE
LYMPHOCYTES ABSOLUTE: 3.8 K/UL (ref 1.1–4.5)
LYMPHOCYTES RELATIVE PERCENT: 50.1 % (ref 20–40)
Lab: NORMAL
MCH RBC QN AUTO: 29.1 PG (ref 27–31)
MCHC RBC AUTO-ENTMCNC: 32.4 G/DL (ref 33–37)
MCV RBC AUTO: 90 FL (ref 80–94)
METHEMOGLOBIN ARTERIAL: 1.2 %
MONOCYTES ABSOLUTE: 0.5 K/UL (ref 0–0.9)
MONOCYTES RELATIVE PERCENT: 6.9 % (ref 0–10)
NEUTROPHILS ABSOLUTE: 2.8 K/UL (ref 1.5–7.5)
NEUTROPHILS RELATIVE PERCENT: 36.9 % (ref 50–65)
NITRITE, URINE: NEGATIVE
O2 CONTENT ARTERIAL: 19.6 ML/DL
O2 SAT, ARTERIAL: 95 %
O2 THERAPY: ABNORMAL
OPIATE SCREEN URINE: NEGATIVE
PCO2 ARTERIAL: 23 MMHG (ref 35–45)
PDW BLD-RTO: 14.4 % (ref 11.5–14.5)
PH ARTERIAL: 7.55 (ref 7.35–7.45)
PH UA: 6.5 (ref 5–8)
PLATELET # BLD: 275 K/UL (ref 130–400)
PMV BLD AUTO: 9 FL (ref 9.4–12.4)
PO2 ARTERIAL: 74 MMHG (ref 80–100)
POTASSIUM SERPL-SCNC: 4.3 MMOL/L (ref 3.5–5)
POTASSIUM, WHOLE BLOOD: 3.6
PRO-BNP: 155 PG/ML (ref 0–900)
PROTEIN UA: NEGATIVE MG/DL
RBC # BLD: 4.91 M/UL (ref 4.7–6.1)
SODIUM BLD-SCNC: 138 MMOL/L (ref 136–145)
SPECIFIC GRAVITY UA: 1.01 (ref 1–1.03)
TOTAL PROTEIN: 6.8 G/DL (ref 6.6–8.7)
TROPONIN: <0.01 NG/ML (ref 0–0.03)
URINE REFLEX TO CULTURE: NORMAL
UROBILINOGEN, URINE: 0.2 E.U./DL
WBC # BLD: 7.6 K/UL (ref 4.8–10.8)

## 2019-04-17 PROCEDURE — 94640 AIRWAY INHALATION TREATMENT: CPT

## 2019-04-17 PROCEDURE — 83880 ASSAY OF NATRIURETIC PEPTIDE: CPT

## 2019-04-17 PROCEDURE — G0480 DRUG TEST DEF 1-7 CLASSES: HCPCS

## 2019-04-17 PROCEDURE — 81003 URINALYSIS AUTO W/O SCOPE: CPT

## 2019-04-17 PROCEDURE — G0378 HOSPITAL OBSERVATION PER HR: HCPCS

## 2019-04-17 PROCEDURE — 96374 THER/PROPH/DIAG INJ IV PUSH: CPT

## 2019-04-17 PROCEDURE — 72125 CT NECK SPINE W/O DYE: CPT

## 2019-04-17 PROCEDURE — 71046 X-RAY EXAM CHEST 2 VIEWS: CPT

## 2019-04-17 PROCEDURE — 36415 COLL VENOUS BLD VENIPUNCTURE: CPT

## 2019-04-17 PROCEDURE — 99285 EMERGENCY DEPT VISIT HI MDM: CPT

## 2019-04-17 PROCEDURE — 36600 WITHDRAWAL OF ARTERIAL BLOOD: CPT

## 2019-04-17 PROCEDURE — 2580000003 HC RX 258: Performed by: INTERNAL MEDICINE

## 2019-04-17 PROCEDURE — 85025 COMPLETE CBC W/AUTO DIFF WBC: CPT

## 2019-04-17 PROCEDURE — 99219 PR INITIAL OBSERVATION CARE/DAY 50 MINUTES: CPT | Performed by: INTERNAL MEDICINE

## 2019-04-17 PROCEDURE — 6370000000 HC RX 637 (ALT 250 FOR IP): Performed by: INTERNAL MEDICINE

## 2019-04-17 PROCEDURE — 6360000002 HC RX W HCPCS: Performed by: PHYSICIAN ASSISTANT

## 2019-04-17 PROCEDURE — 70450 CT HEAD/BRAIN W/O DYE: CPT

## 2019-04-17 PROCEDURE — 93005 ELECTROCARDIOGRAM TRACING: CPT

## 2019-04-17 PROCEDURE — 84484 ASSAY OF TROPONIN QUANT: CPT

## 2019-04-17 PROCEDURE — 6360000002 HC RX W HCPCS: Performed by: INTERNAL MEDICINE

## 2019-04-17 PROCEDURE — 96376 TX/PRO/DX INJ SAME DRUG ADON: CPT

## 2019-04-17 PROCEDURE — 99285 EMERGENCY DEPT VISIT HI MDM: CPT | Performed by: PHYSICIAN ASSISTANT

## 2019-04-17 PROCEDURE — 6370000000 HC RX 637 (ALT 250 FOR IP)

## 2019-04-17 PROCEDURE — 80307 DRUG TEST PRSMV CHEM ANLYZR: CPT

## 2019-04-17 PROCEDURE — 82803 BLOOD GASES ANY COMBINATION: CPT

## 2019-04-17 PROCEDURE — 94664 DEMO&/EVAL PT USE INHALER: CPT

## 2019-04-17 PROCEDURE — 80053 COMPREHEN METABOLIC PANEL: CPT

## 2019-04-17 PROCEDURE — 84132 ASSAY OF SERUM POTASSIUM: CPT

## 2019-04-17 RX ORDER — SODIUM CHLORIDE 0.9 % (FLUSH) 0.9 %
10 SYRINGE (ML) INJECTION PRN
Status: DISCONTINUED | OUTPATIENT
Start: 2019-04-17 | End: 2019-04-18 | Stop reason: HOSPADM

## 2019-04-17 RX ORDER — IPRATROPIUM BROMIDE AND ALBUTEROL SULFATE 2.5; .5 MG/3ML; MG/3ML
2 SOLUTION RESPIRATORY (INHALATION) ONCE
Status: COMPLETED | OUTPATIENT
Start: 2019-04-17 | End: 2019-04-17

## 2019-04-17 RX ORDER — METOPROLOL SUCCINATE 25 MG/1
25 TABLET, EXTENDED RELEASE ORAL DAILY
Status: DISCONTINUED | OUTPATIENT
Start: 2019-04-17 | End: 2019-04-18 | Stop reason: HOSPADM

## 2019-04-17 RX ORDER — IPRATROPIUM BROMIDE AND ALBUTEROL SULFATE 2.5; .5 MG/3ML; MG/3ML
1 SOLUTION RESPIRATORY (INHALATION) 4 TIMES DAILY
Status: DISCONTINUED | OUTPATIENT
Start: 2019-04-17 | End: 2019-04-17

## 2019-04-17 RX ORDER — SODIUM CHLORIDE 0.9 % (FLUSH) 0.9 %
10 SYRINGE (ML) INJECTION EVERY 12 HOURS SCHEDULED
Status: DISCONTINUED | OUTPATIENT
Start: 2019-04-17 | End: 2019-04-18 | Stop reason: HOSPADM

## 2019-04-17 RX ORDER — NITROGLYCERIN 0.4 MG/1
0.4 TABLET SUBLINGUAL EVERY 5 MIN PRN
Status: DISCONTINUED | OUTPATIENT
Start: 2019-04-17 | End: 2019-04-18 | Stop reason: HOSPADM

## 2019-04-17 RX ORDER — METHYLPREDNISOLONE SODIUM SUCCINATE 125 MG/2ML
125 INJECTION, POWDER, LYOPHILIZED, FOR SOLUTION INTRAMUSCULAR; INTRAVENOUS ONCE
Status: COMPLETED | OUTPATIENT
Start: 2019-04-17 | End: 2019-04-17

## 2019-04-17 RX ORDER — PRASUGREL 10 MG/1
10 TABLET, FILM COATED ORAL DAILY
Status: DISCONTINUED | OUTPATIENT
Start: 2019-04-17 | End: 2019-04-18 | Stop reason: HOSPADM

## 2019-04-17 RX ORDER — ASPIRIN 81 MG/1
81 TABLET, CHEWABLE ORAL DAILY
Status: DISCONTINUED | OUTPATIENT
Start: 2019-04-17 | End: 2019-04-18 | Stop reason: HOSPADM

## 2019-04-17 RX ORDER — IPRATROPIUM BROMIDE AND ALBUTEROL SULFATE 2.5; .5 MG/3ML; MG/3ML
2 SOLUTION RESPIRATORY (INHALATION) EVERY 4 HOURS PRN
Status: DISCONTINUED | OUTPATIENT
Start: 2019-04-17 | End: 2019-04-17

## 2019-04-17 RX ORDER — LISINOPRIL 5 MG/1
5 TABLET ORAL DAILY
Status: DISCONTINUED | OUTPATIENT
Start: 2019-04-17 | End: 2019-04-18 | Stop reason: HOSPADM

## 2019-04-17 RX ORDER — IPRATROPIUM BROMIDE AND ALBUTEROL SULFATE 2.5; .5 MG/3ML; MG/3ML
SOLUTION RESPIRATORY (INHALATION)
Status: COMPLETED
Start: 2019-04-17 | End: 2019-04-17

## 2019-04-17 RX ORDER — ATORVASTATIN CALCIUM 40 MG/1
80 TABLET, FILM COATED ORAL NIGHTLY
Status: DISCONTINUED | OUTPATIENT
Start: 2019-04-17 | End: 2019-04-18 | Stop reason: HOSPADM

## 2019-04-17 RX ORDER — FUROSEMIDE 20 MG/1
20 TABLET ORAL DAILY PRN
Status: DISCONTINUED | OUTPATIENT
Start: 2019-04-17 | End: 2019-04-18 | Stop reason: HOSPADM

## 2019-04-17 RX ORDER — ONDANSETRON 2 MG/ML
4 INJECTION INTRAMUSCULAR; INTRAVENOUS EVERY 6 HOURS PRN
Status: DISCONTINUED | OUTPATIENT
Start: 2019-04-17 | End: 2019-04-18 | Stop reason: HOSPADM

## 2019-04-17 RX ORDER — ISOSORBIDE MONONITRATE 60 MG/1
120 TABLET, EXTENDED RELEASE ORAL DAILY
Status: DISCONTINUED | OUTPATIENT
Start: 2019-04-17 | End: 2019-04-18 | Stop reason: HOSPADM

## 2019-04-17 RX ORDER — IPRATROPIUM BROMIDE AND ALBUTEROL SULFATE 2.5; .5 MG/3ML; MG/3ML
3 SOLUTION RESPIRATORY (INHALATION)
Status: DISCONTINUED | OUTPATIENT
Start: 2019-04-17 | End: 2019-04-18 | Stop reason: HOSPADM

## 2019-04-17 RX ORDER — ISOSORBIDE MONONITRATE 120 MG/1
120 TABLET, EXTENDED RELEASE ORAL DAILY
COMMUNITY
End: 2019-06-07 | Stop reason: DRUGHIGH

## 2019-04-17 RX ADMIN — ATORVASTATIN CALCIUM 80 MG: 40 TABLET, FILM COATED ORAL at 20:27

## 2019-04-17 RX ADMIN — IPRATROPIUM BROMIDE AND ALBUTEROL SULFATE 2 AMPULE: 2.5; .5 SOLUTION RESPIRATORY (INHALATION) at 16:50

## 2019-04-17 RX ADMIN — IPRATROPIUM BROMIDE AND ALBUTEROL SULFATE 2 AMPULE: .5; 3 SOLUTION RESPIRATORY (INHALATION) at 11:40

## 2019-04-17 RX ADMIN — METHYLPREDNISOLONE SODIUM SUCCINATE 125 MG: 125 INJECTION, POWDER, FOR SOLUTION INTRAMUSCULAR; INTRAVENOUS at 12:42

## 2019-04-17 RX ADMIN — METHYLPREDNISOLONE SODIUM SUCCINATE 125 MG: 125 INJECTION, POWDER, FOR SOLUTION INTRAMUSCULAR; INTRAVENOUS at 17:15

## 2019-04-17 RX ADMIN — IPRATROPIUM BROMIDE AND ALBUTEROL SULFATE 3 ML: 2.5; .5 SOLUTION RESPIRATORY (INHALATION) at 18:22

## 2019-04-17 RX ADMIN — IPRATROPIUM BROMIDE AND ALBUTEROL SULFATE 2 AMPULE: 2.5; .5 SOLUTION RESPIRATORY (INHALATION) at 11:40

## 2019-04-17 RX ADMIN — Medication 10 ML: at 20:28

## 2019-04-17 RX ADMIN — ENOXAPARIN SODIUM 40 MG: 40 INJECTION SUBCUTANEOUS at 17:15

## 2019-04-17 SDOH — HEALTH STABILITY: MENTAL HEALTH: HOW OFTEN DO YOU HAVE A DRINK CONTAINING ALCOHOL?: NEVER

## 2019-04-17 ASSESSMENT — ENCOUNTER SYMPTOMS
VOMITING: 0
NAUSEA: 0
STRIDOR: 0
EYE ITCHING: 0
SHORTNESS OF BREATH: 1
CHOKING: 0
EYE DISCHARGE: 0
CONSTIPATION: 0
COLOR CHANGE: 0
BACK PAIN: 0
ABDOMINAL PAIN: 0
COUGH: 1
WHEEZING: 1
PHOTOPHOBIA: 0
DIARRHEA: 0

## 2019-04-17 ASSESSMENT — PAIN SCALES - GENERAL: PAINLEVEL_OUTOF10: 0

## 2019-04-17 NOTE — PROGRESS NOTES
BLOOD GAS, ARTERIAL   Status:  Final result    Ref Range & Units 04/17/19 1300   pH, Arterial 7.350 - 7.450 7.550High Panic     pCO2, Arterial 35.0 - 45.0 mmHg 23. 0Low     pO2, Arterial 80.0 - 100.0 mmHg 74. 0Low     HCO3, Arterial 22.0 - 26.0 mmol/L 20.1Low     Base Excess, Arterial -2.0 - 2.0 mmol/L -0.3    Hemoglobin, Art, Extended 14.0 - 18.0 g/dL 14.7    O2 Sat, Arterial >92 % 95.0    Carboxyhgb, Arterial 0.0 - 5.0 % 1.2    Comment:      0.0-1.5   (Smokers 1.5-5.0)    Methemoglobin, Arterial <1.5 % 1.2    O2 Content, Arterial Not Established mL/dL 19.6    O2 Therapy  Unknown    Resulting Agency  [facility]   Narrative     CALL  [facility]  KLE tel. , results handed to JONATHAN Serrano [facility], 04/17/2019 13:07, by [facility]        Specimen Collected: 04/17/19 1300 Last Resulted: 04/17/19 1306         Patient on room air. RR 28. Site of choice right radial.  AT +.
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
## 2019-04-18 VITALS
RESPIRATION RATE: 20 BRPM | SYSTOLIC BLOOD PRESSURE: 140 MMHG | WEIGHT: 208.4 LBS | OXYGEN SATURATION: 92 % | HEART RATE: 94 BPM | BODY MASS INDEX: 28.23 KG/M2 | DIASTOLIC BLOOD PRESSURE: 78 MMHG | TEMPERATURE: 98.5 F | HEIGHT: 72 IN

## 2019-04-18 PROBLEM — I10 HYPERTENSION: Status: ACTIVE | Noted: 2019-04-18

## 2019-04-18 PROBLEM — J44.1 COPD EXACERBATION (HCC): Status: ACTIVE | Noted: 2019-04-18

## 2019-04-18 PROCEDURE — 94640 AIRWAY INHALATION TREATMENT: CPT

## 2019-04-18 PROCEDURE — 2580000003 HC RX 258: Performed by: INTERNAL MEDICINE

## 2019-04-18 PROCEDURE — G0378 HOSPITAL OBSERVATION PER HR: HCPCS

## 2019-04-18 PROCEDURE — 6370000000 HC RX 637 (ALT 250 FOR IP): Performed by: INTERNAL MEDICINE

## 2019-04-18 PROCEDURE — 99217 PR OBSERVATION CARE DISCHARGE MANAGEMENT: CPT | Performed by: PHYSICIAN ASSISTANT

## 2019-04-18 PROCEDURE — 94761 N-INVAS EAR/PLS OXIMETRY MLT: CPT

## 2019-04-18 RX ORDER — AZITHROMYCIN 250 MG/1
250 TABLET, FILM COATED ORAL SEE ADMIN INSTRUCTIONS
Qty: 6 TABLET | Refills: 0 | Status: SHIPPED | OUTPATIENT
Start: 2019-04-18 | End: 2019-04-23

## 2019-04-18 RX ORDER — PREDNISONE 20 MG/1
40 TABLET ORAL DAILY
Qty: 10 TABLET | Refills: 0 | Status: SHIPPED | OUTPATIENT
Start: 2019-04-18 | End: 2019-04-23

## 2019-04-18 RX ADMIN — Medication 10 ML: at 07:59

## 2019-04-18 RX ADMIN — ISOSORBIDE MONONITRATE 120 MG: 60 TABLET, EXTENDED RELEASE ORAL at 07:58

## 2019-04-18 RX ADMIN — IPRATROPIUM BROMIDE AND ALBUTEROL SULFATE 3 ML: 2.5; .5 SOLUTION RESPIRATORY (INHALATION) at 10:37

## 2019-04-18 RX ADMIN — ASPIRIN 81 MG 81 MG: 81 TABLET ORAL at 07:59

## 2019-04-18 RX ADMIN — LISINOPRIL 5 MG: 5 TABLET ORAL at 07:59

## 2019-04-18 RX ADMIN — METOPROLOL SUCCINATE 25 MG: 25 TABLET, EXTENDED RELEASE ORAL at 07:59

## 2019-04-18 RX ADMIN — PRASUGREL 10 MG: 10 TABLET, FILM COATED ORAL at 07:59

## 2019-04-18 RX ADMIN — IPRATROPIUM BROMIDE AND ALBUTEROL SULFATE 3 ML: 2.5; .5 SOLUTION RESPIRATORY (INHALATION) at 06:28

## 2019-04-18 ASSESSMENT — PAIN SCALES - GENERAL: PAINLEVEL_OUTOF10: 0

## 2019-04-18 NOTE — CARE COORDINATION
Completed the dc assessment with the pt who reports residing at home with his spouse and intends to dc to the same location. Pt denies any in home services prior to admission. Pt reports having all necessary DME, including a cane he uses daily, a walker for use outside the home and a walk-in shower with a seat. Pt reports his current meds are affordable and denies further dc needs at this time. SW will continue to follow and assist as necessary.
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
## 2019-04-21 LAB
EKG P AXIS: 61 DEGREES
EKG P-R INTERVAL: 164 MS
EKG Q-T INTERVAL: 432 MS
EKG QRS DURATION: 114 MS
EKG QTC CALCULATION (BAZETT): 446 MS
EKG T AXIS: 46 DEGREES

## 2019-04-29 RX ORDER — LISINOPRIL 5 MG/1
5 TABLET ORAL DAILY
Qty: 30 TABLET | Refills: 5 | Status: SHIPPED | OUTPATIENT
Start: 2019-04-29 | End: 2019-10-03 | Stop reason: SDUPTHER

## 2019-04-29 RX ORDER — METOPROLOL SUCCINATE 25 MG/1
25 TABLET, EXTENDED RELEASE ORAL DAILY
Qty: 30 TABLET | Refills: 5 | Status: SHIPPED | OUTPATIENT
Start: 2019-04-29 | End: 2019-10-03 | Stop reason: SDUPTHER

## 2019-05-06 RX ORDER — METOPROLOL SUCCINATE 25 MG/1
25 TABLET, EXTENDED RELEASE ORAL DAILY
Qty: 30 TABLET | Refills: 5 | Status: SHIPPED | OUTPATIENT
Start: 2019-05-06 | End: 2019-06-10 | Stop reason: SDUPTHER

## 2019-05-13 RX ORDER — ATORVASTATIN CALCIUM 80 MG/1
80 TABLET, FILM COATED ORAL DAILY
Qty: 30 TABLET | Refills: 0 | Status: SHIPPED | OUTPATIENT
Start: 2019-05-13 | End: 2019-06-29 | Stop reason: SDUPTHER

## 2019-05-29 DIAGNOSIS — I25.10 CORONARY ARTERY DISEASE INVOLVING NATIVE CORONARY ARTERY OF NATIVE HEART WITHOUT ANGINA PECTORIS: ICD-10-CM

## 2019-05-29 RX ORDER — PRASUGREL 10 MG/1
10 TABLET, FILM COATED ORAL DAILY
Qty: 30 TABLET | Refills: 0 | Status: SHIPPED | OUTPATIENT
Start: 2019-05-29 | End: 2021-11-29

## 2019-05-31 ENCOUNTER — TELEPHONE (OUTPATIENT)
Dept: NEUROLOGY | Age: 69
End: 2019-05-31

## 2019-05-31 NOTE — TELEPHONE ENCOUNTER
Received a referral from Dr. Patel [facility] office for this patient. Called and spoke with patient to let him when I have him scheduled an appointment at our office. Patient is aware of the appointment time/date/location.

## 2019-06-03 DIAGNOSIS — I25.118 CORONARY ARTERY DISEASE OF NATIVE HEART WITH STABLE ANGINA PECTORIS, UNSPECIFIED VESSEL OR LESION TYPE (HCC): ICD-10-CM

## 2019-06-04 RX ORDER — ISOSORBIDE MONONITRATE 120 MG/1
TABLET, EXTENDED RELEASE ORAL
Qty: 30 TABLET | Refills: 0 | OUTPATIENT
Start: 2019-06-04

## 2019-06-04 NOTE — TELEPHONE ENCOUNTER
The AVS that was printed from the [facility] with you on 2/11/19 says to start Furosemide and change how you take Imdur and the Imdur says 30 mg tablet and to take 4 tablet daily but your note does not reflect this. How should patient really be taking Imdur?

## 2019-06-05 ENCOUNTER — TELEPHONE (OUTPATIENT)
Dept: CARDIOLOGY | Age: 69
End: 2019-06-05

## 2019-06-05 NOTE — TELEPHONE ENCOUNTER
Chad Gonzalez requests that a nurse return their call. The best time to reach him is Anytime. Thank you.

## 2019-06-07 ENCOUNTER — TELEPHONE (OUTPATIENT)
Dept: CARDIOLOGY | Age: 69
End: 2019-06-07

## 2019-06-07 RX ORDER — ISOSORBIDE MONONITRATE 30 MG/1
30 TABLET, EXTENDED RELEASE ORAL DAILY
Qty: 30 TABLET | Refills: 3 | Status: SHIPPED | OUTPATIENT
Start: 2019-06-07 | End: 2019-10-03 | Stop reason: SDUPTHER

## 2019-06-07 NOTE — TELEPHONE ENCOUNTER
Called and spoke with Raegan Ravi, scheduled appointment at the [facility] on Monday 03/10/2019 at 3:15 for Reford Oats. Verbalized understanding and agreeable to appointment.

## 2019-06-10 ENCOUNTER — OFFICE VISIT (OUTPATIENT)
Dept: CARDIOLOGY | Age: 69
End: 2019-06-10
Payer: MEDICARE

## 2019-06-10 VITALS
BODY MASS INDEX: 29.26 KG/M2 | DIASTOLIC BLOOD PRESSURE: 84 MMHG | HEIGHT: 72 IN | SYSTOLIC BLOOD PRESSURE: 126 MMHG | HEART RATE: 72 BPM | WEIGHT: 216 LBS

## 2019-06-10 DIAGNOSIS — I95.9 HYPOTENSION, UNSPECIFIED HYPOTENSION TYPE: Primary | ICD-10-CM

## 2019-06-10 PROCEDURE — 99211 OFF/OP EST MAY X REQ PHY/QHP: CPT | Performed by: NURSE PRACTITIONER

## 2019-06-10 RX ORDER — GUAIFENESIN 600 MG/1
600 TABLET, EXTENDED RELEASE ORAL DAILY
COMMUNITY
End: 2021-08-26

## 2019-06-10 RX ORDER — TAMSULOSIN HYDROCHLORIDE 0.4 MG/1
0.4 CAPSULE ORAL DAILY
COMMUNITY
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
## 2019-07-01 RX ORDER — ATORVASTATIN CALCIUM 80 MG/1
TABLET, FILM COATED ORAL
Qty: 30 TABLET | Refills: 0 | Status: SHIPPED | OUTPATIENT
Start: 2019-07-01

## 2019-07-10 ENCOUNTER — TELEPHONE (OUTPATIENT)
Dept: CARDIOLOGY | Age: 69
End: 2019-07-10

## 2019-08-12 ENCOUNTER — OFFICE VISIT (OUTPATIENT)
Dept: CARDIOLOGY | Age: 69
End: 2019-08-12
Payer: MEDICARE

## 2019-08-12 DIAGNOSIS — I10 ESSENTIAL HYPERTENSION: ICD-10-CM

## 2019-08-12 DIAGNOSIS — I25.10 CORONARY ARTERY DISEASE INVOLVING NATIVE CORONARY ARTERY OF NATIVE HEART WITHOUT ANGINA PECTORIS: Primary | ICD-10-CM

## 2019-08-12 DIAGNOSIS — J44.1 COPD EXACERBATION (HCC): ICD-10-CM

## 2019-08-12 PROCEDURE — 99213 OFFICE O/P EST LOW 20 MIN: CPT | Performed by: NURSE PRACTITIONER

## 2019-08-12 NOTE — PROGRESS NOTES
arthralgias and gait problem. Skin: Negative for color change, pallor, rash and wound. Neurological: Negative for dizziness, tremors, speech difficulty, weakness and numbness. Hematological: Does not bruise/bleed easily. Psychiatric/Behavioral: Negative.         Past Medical History:   Diagnosis Date    CAD (coronary artery disease)     Chest pain 2018    [facility]     [facility]) [facility]     Hyperlipidemia     Hypertension     Pneumonia        Past Surgical History:   Procedure Laterality Date    APPENDECTOMY      BACK SURGERY      COLONOSCOPY      ENDOSCOPY, COLON, DIAGNOSTIC         Family History   Problem Relation Age of Onset    Cancer Sister     Cancer Brother        Social History     Socioeconomic History    Marital status:      Spouse name: Silverio Gonzalez    Number of children: 3    Years of education: 6    Highest education level: Not on file   Occupational History    Not on file   Social Needs    Financial resource strain: Not on file    Food insecurity:     Worry: Not on file     Inability: Not on file    Transportation needs:     Medical: Not on file     Non-medical: Not on file   Tobacco Use    Smoking status: Former Smoker     Packs/day: 1.00     Years: 50.00     Pack years: 50.00     Types: Cigarettes     Last attempt to quit: 2018     Years since quittin.2    Smokeless tobacco: Current User     Types: Chew   Substance and Sexual Activity    Alcohol use: Not Currently     Frequency: Never     Comment: occassional    Drug use: No    Sexual activity: Not on file   Lifestyle    Physical activity:     Days per week: Not on file     Minutes per session: Not on file    Stress: Not on file   Relationships    Social connections:     Talks on phone: Not on file     Gets together: Not on file     Attends Yarsanism service: Not on file     Active member of club or organization: Not on file     Attends

## 2019-08-19 VITALS
HEART RATE: 76 BPM | BODY MASS INDEX: 29.53 KG/M2 | SYSTOLIC BLOOD PRESSURE: 142 MMHG | DIASTOLIC BLOOD PRESSURE: 78 MMHG | WEIGHT: 218 LBS | HEIGHT: 72 IN

## 2019-08-20 ENCOUNTER — TELEPHONE (OUTPATIENT)
Dept: CARDIOLOGY | Age: 69
End: 2019-08-20

## 2019-09-04 ENCOUNTER — TELEPHONE (OUTPATIENT)
Dept: CARDIOLOGY | Age: 69
End: 2019-09-04

## 2019-10-03 RX ORDER — LISINOPRIL 5 MG/1
5 TABLET ORAL DAILY
Qty: 90 TABLET | Refills: 3 | Status: SHIPPED | OUTPATIENT
Start: 2019-10-03 | End: 2020-10-22 | Stop reason: SDUPTHER

## 2019-10-03 RX ORDER — ISOSORBIDE MONONITRATE 30 MG/1
TABLET, EXTENDED RELEASE ORAL
Qty: 90 TABLET | Refills: 0 | Status: ON HOLD | OUTPATIENT
Start: 2019-10-03 | End: 2020-03-27 | Stop reason: SDUPTHER

## 2019-10-03 RX ORDER — METOPROLOL SUCCINATE 25 MG/1
25 TABLET, EXTENDED RELEASE ORAL DAILY
Qty: 90 TABLET | Refills: 3 | Status: ON HOLD
Start: 2019-10-03 | End: 2020-03-27 | Stop reason: HOSPADM

## 2020-02-10 ENCOUNTER — OFFICE VISIT (OUTPATIENT)
Dept: CARDIOLOGY | Age: 70
End: 2020-02-10
Payer: MEDICARE

## 2020-02-10 VITALS
BODY MASS INDEX: 29.97 KG/M2 | OXYGEN SATURATION: 98 % | SYSTOLIC BLOOD PRESSURE: 128 MMHG | DIASTOLIC BLOOD PRESSURE: 80 MMHG | HEART RATE: 98 BPM | WEIGHT: 221 LBS

## 2020-02-10 PROCEDURE — G8484 FLU IMMUNIZE NO ADMIN: HCPCS | Performed by: NURSE PRACTITIONER

## 2020-02-10 PROCEDURE — 1123F ACP DISCUSS/DSCN MKR DOCD: CPT | Performed by: NURSE PRACTITIONER

## 2020-02-10 PROCEDURE — 4004F PT TOBACCO SCREEN RCVD TLK: CPT | Performed by: NURSE PRACTITIONER

## 2020-02-10 PROCEDURE — G8427 DOCREV CUR MEDS BY ELIG CLIN: HCPCS | Performed by: NURSE PRACTITIONER

## 2020-02-10 PROCEDURE — 3017F COLORECTAL CA SCREEN DOC REV: CPT | Performed by: NURSE PRACTITIONER

## 2020-02-10 PROCEDURE — 99214 OFFICE O/P EST MOD 30 MIN: CPT | Performed by: NURSE PRACTITIONER

## 2020-02-10 PROCEDURE — G8417 CALC BMI ABV UP PARAM F/U: HCPCS | Performed by: NURSE PRACTITIONER

## 2020-02-10 PROCEDURE — 4040F PNEUMOC VAC/ADMIN/RCVD: CPT | Performed by: NURSE PRACTITIONER

## 2020-02-10 NOTE — PROGRESS NOTES
[facility] Cardiology   Established Patient Office Visit  [address] [facility] [facility]  [phone]        OFFICE VISIT:  2/10/2020    Yogi Carmona - : 1950    Reason For Visit:   Morales Jason is a 81 y.o. male who is here for Follow-up; Hypertension; COPD; and Coronary Artery Disease    1. Coronary artery disease involving native coronary artery of native heart without angina pectoris    2. Essential hypertension    3. Hyperlipidemia, unspecified hyperlipidemia type        Patient with a history of hypertension, hyperlipidemia, coronary artery disease, and COPD. He is a patient of Dr. Lena Shaffer. Patient presents to clinic today for 6-month follow-up. Patient denies any current complaints of chest pain, pressure or tightness. There is shortness of breath, this is his baseline and has not worsened. There is no orthopnea or PND. Patient denies any lightheadedness, dizziness or syncope. DATA:  Cath 2018  Summary:      1.  Successful femoral artery ultrasound  2.  Successful femoral artery arterogram  3.  Diffuse severe multiple vessel coronary artery disease   involving the distal left main, entire LAD, circumflex and RCA   vessels  4.  Left ventricular function is impaired in the anterior lateral   wall with a visually estimated ejection fraction of 40%   5.  99% long diffuse lesion in the mid LAD  6.  Successful percutaneous coronary intervention utilizing a two   bare metal stents to the mid LAD.     Patient was referred to CTS but he was not a candidate due to his pulmonary status.     Subjective    Yogi Carmona is a 81 y.o. male with the following history as recorded in [facility]:    Patient Active Problem List    Diagnosis Date Noted    COPD exacerbation ([facility] 2019    Hypertension 2019    Syncope and collapse 2019    Chest pain 2018    CAD (coronary artery disease) 2018    STEMI (ST elevation myocardial infarction) ([facility] 06/05/2018    Acute ST elevation myocardial infarction (STEMI) involving left anterior descending (LAD) coronary artery ([facility])     Electronic cigarette use      Current Outpatient Medications   Medication Sig Dispense Refill    isosorbide mononitrate (IMDUR) 30 MG extended release tablet TAKE ONE TABLET BY MOUTH ONCE A DAY *DO NOT TAKE IF SYSTOLIC BLOOD PRESSURE IS LESS THAN 100* 90 tablet 0    metoprolol succinate (TOPROL XL) 25 MG extended release tablet TAKE 1 TABLET BY MOUTH DAILY 90 tablet 3    lisinopril (PRINIVIL;ZESTRIL) 5 MG tablet TAKE 1 TABLET BY MOUTH DAILY 90 tablet 3    atorvastatin (LIPITOR) 80 MG tablet TAKE 1 TABLET BY MOUTH ONCE DAILY 30 tablet 0    tamsulosin (FLOMAX) 0.4 MG capsule Take 0.4 mg by mouth daily      guaiFENesin (MUCINEX) 600 MG extended release tablet Take 600 mg by mouth daily      prasugrel (EFFIENT) 10 MG TABS TAKE 1 TABLET BY MOUTH DAILY 30 tablet 0    furosemide (LASIX) 20 MG tablet Take 1 tablet by mouth daily as needed (weight gain of 3 lbs overnight or 5 pounds in a week.) 90 tablet 1    nitroGLYCERIN (NITROSTAT) 0.4 MG SL tablet Place 1 tablet under the tongue every 5 minutes as needed for Chest pain 25 tablet 3    aspirin 81 MG chewable tablet Take 1 tablet by mouth daily 30 tablet 3    ipratropium-albuterol (DUONEB) 0.5-2.5 (3) MG/3ML SOLN nebulizer solution Inhale 3 mLs into the lungs every 4 hours (while awake) 360 mL 2     No current facility-administered medications for this visit. Allergies: Patient has no known allergies.   Past Medical History:   Diagnosis Date    CAD (coronary artery disease)     Chest pain 11/14/2018    [facility]congestive heart failure) ([facility])     [facility]     Hyperlipidemia     Hypertension     Pneumonia      Past Surgical History:   Procedure Laterality Date    APPENDECTOMY      BACK SURGERY      COLONOSCOPY      ENDOSCOPY, COLON, DIAGNOSTIC       Family History   Problem

## 2020-02-28 PROCEDURE — 88305 TISSUE EXAM BY PATHOLOGIST: CPT | Performed by: GENERAL PRACTICE

## 2020-02-28 PROCEDURE — 88112 CYTOPATH CELL ENHANCE TECH: CPT | Performed by: GENERAL PRACTICE

## 2020-03-02 ENCOUNTER — LAB REQUISITION (OUTPATIENT)
Dept: LAB | Facility: HOSPITAL | Age: 70
End: 2020-03-02

## 2020-03-02 DIAGNOSIS — J44.1 CHRONIC OBSTRUCTIVE PULMONARY DISEASE WITH (ACUTE) EXACERBATION (HCC): ICD-10-CM

## 2020-03-03 LAB
CYTO UR: NORMAL
LAB AP CASE REPORT: NORMAL
LAB AP CLINICAL INFORMATION: NORMAL
LAB AP DIAGNOSIS COMMENT: NORMAL
PATH REPORT.FINAL DX SPEC: NORMAL
PATH REPORT.GROSS SPEC: NORMAL

## 2020-03-11 ENCOUNTER — HOSPITAL ENCOUNTER (INPATIENT)
Age: 70
LOS: 16 days | Discharge: NURSING, REHAB OR LONG-TERM CARE | DRG: 207 | End: 2020-03-27
Attending: INTERNAL MEDICINE | Admitting: HOSPITALIST
Payer: MEDICARE

## 2020-03-11 ENCOUNTER — APPOINTMENT (OUTPATIENT)
Dept: GENERAL RADIOLOGY | Age: 70
DRG: 207 | End: 2020-03-11
Attending: INTERNAL MEDICINE
Payer: MEDICARE

## 2020-03-11 PROBLEM — I21.4 NSTEMI (NON-ST ELEVATED MYOCARDIAL INFARCTION) (HCC): Status: ACTIVE | Noted: 2020-03-11

## 2020-03-11 LAB
ALBUMIN SERPL-MCNC: 3 G/DL (ref 3.5–5.2)
ALP BLD-CCNC: 66 U/L (ref 40–130)
ALT SERPL-CCNC: 20 U/L (ref 5–41)
AMPHETAMINE SCREEN, URINE: NEGATIVE
ANION GAP SERPL CALCULATED.3IONS-SCNC: 9 MMOL/L (ref 7–19)
APTT: 31.4 SEC (ref 26–36.2)
AST SERPL-CCNC: 25 U/L (ref 5–40)
BACTERIA: NEGATIVE /HPF
BARBITURATE SCREEN URINE: NEGATIVE
BASE EXCESS ARTERIAL: 1.7 MMOL/L (ref -2–2)
BASOPHILS ABSOLUTE: 0 K/UL (ref 0–0.2)
BASOPHILS RELATIVE PERCENT: 0.1 % (ref 0–1)
BENZODIAZEPINE SCREEN, URINE: POSITIVE
BILIRUB SERPL-MCNC: 0.4 MG/DL (ref 0.2–1.2)
BILIRUBIN URINE: NEGATIVE
BLOOD, URINE: ABNORMAL
BUN BLDV-MCNC: 22 MG/DL (ref 8–23)
CALCIUM SERPL-MCNC: 8.2 MG/DL (ref 8.8–10.2)
CANNABINOID SCREEN URINE: NEGATIVE
CARBOXYHEMOGLOBIN ARTERIAL: 1.9 % (ref 0–5)
CHLORIDE BLD-SCNC: 103 MMOL/L (ref 98–111)
CLARITY: CLEAR
CO2: 25 MMOL/L (ref 22–29)
COCAINE METABOLITE SCREEN URINE: NEGATIVE
COLOR: YELLOW
CREAT SERPL-MCNC: 1 MG/DL (ref 0.5–1.2)
EOSINOPHILS ABSOLUTE: 0 K/UL (ref 0–0.6)
EOSINOPHILS RELATIVE PERCENT: 0 % (ref 0–5)
EPITHELIAL CELLS, UA: 1 /HPF (ref 0–5)
GFR NON-AFRICAN AMERICAN: >60
GLUCOSE BLD-MCNC: 147 MG/DL (ref 70–99)
GLUCOSE BLD-MCNC: 163 MG/DL (ref 74–109)
GLUCOSE URINE: NEGATIVE MG/DL
HBA1C MFR BLD: 6 % (ref 4–6)
HCO3 ARTERIAL: 28.9 MMOL/L (ref 22–26)
HCT VFR BLD CALC: 39.1 % (ref 42–52)
HEMOGLOBIN, ART, EXTENDED: 12.7 G/DL (ref 14–18)
HEMOGLOBIN: 12.5 G/DL (ref 14–18)
HYALINE CASTS: 2 /HPF (ref 0–8)
IMMATURE GRANULOCYTES #: 0.1 K/UL
INR BLD: 0.98 (ref 0.88–1.18)
KETONES, URINE: NEGATIVE MG/DL
LACTIC ACID: 0.8 MMOL/L (ref 0.5–1.9)
LEUKOCYTE ESTERASE, URINE: NEGATIVE
LYMPHOCYTES ABSOLUTE: 0.5 K/UL (ref 1.1–4.5)
LYMPHOCYTES RELATIVE PERCENT: 5.1 % (ref 20–40)
Lab: ABNORMAL
MAGNESIUM: 2.4 MG/DL (ref 1.6–2.4)
MCH RBC QN AUTO: 28.4 PG (ref 27–31)
MCHC RBC AUTO-ENTMCNC: 32 G/DL (ref 33–37)
MCV RBC AUTO: 88.9 FL (ref 80–94)
METHEMOGLOBIN ARTERIAL: 1.2 %
MONOCYTES ABSOLUTE: 0.3 K/UL (ref 0–0.9)
MONOCYTES RELATIVE PERCENT: 2.8 % (ref 0–10)
NEUTROPHILS ABSOLUTE: 9.5 K/UL (ref 1.5–7.5)
NEUTROPHILS RELATIVE PERCENT: 91.1 % (ref 50–65)
NITRITE, URINE: NEGATIVE
O2 CONTENT ARTERIAL: 16.5 ML/DL
O2 SAT, ARTERIAL: 92.4 %
O2 THERAPY: ABNORMAL
OPIATE SCREEN URINE: NEGATIVE
PCO2 ARTERIAL: 56 MMHG (ref 35–45)
PDW BLD-RTO: 15.9 % (ref 11.5–14.5)
PERFORMED ON: ABNORMAL
PH ARTERIAL: 7.32 (ref 7.35–7.45)
PH UA: 6 (ref 5–8)
PLATELET # BLD: 160 K/UL (ref 130–400)
PMV BLD AUTO: 9.8 FL (ref 9.4–12.4)
PO2 ARTERIAL: 65 MMHG (ref 80–100)
POTASSIUM SERPL-SCNC: 4.2 MMOL/L (ref 3.5–5)
POTASSIUM, WHOLE BLOOD: 4.3
PRO-BNP: 760 PG/ML (ref 0–900)
PROTEIN UA: NEGATIVE MG/DL
PROTHROMBIN TIME: 12.9 SEC (ref 12–14.6)
RBC # BLD: 4.4 M/UL (ref 4.7–6.1)
RBC UA: 124 /HPF (ref 0–4)
SODIUM BLD-SCNC: 137 MMOL/L (ref 136–145)
SPECIFIC GRAVITY UA: 1.01 (ref 1–1.03)
TOTAL PROTEIN: 6 G/DL (ref 6.6–8.7)
TROPONIN: 0.18 NG/ML (ref 0–0.03)
UROBILINOGEN, URINE: 0.2 E.U./DL
WBC # BLD: 10.4 K/UL (ref 4.8–10.8)
WBC UA: 1 /HPF (ref 0–5)

## 2020-03-11 PROCEDURE — 2580000003 HC RX 258: Performed by: INTERNAL MEDICINE

## 2020-03-11 PROCEDURE — 83735 ASSAY OF MAGNESIUM: CPT

## 2020-03-11 PROCEDURE — 81001 URINALYSIS AUTO W/SCOPE: CPT

## 2020-03-11 PROCEDURE — 80307 DRUG TEST PRSMV CHEM ANLYZR: CPT

## 2020-03-11 PROCEDURE — 36415 COLL VENOUS BLD VENIPUNCTURE: CPT

## 2020-03-11 PROCEDURE — 84132 ASSAY OF SERUM POTASSIUM: CPT

## 2020-03-11 PROCEDURE — 94002 VENT MGMT INPAT INIT DAY: CPT

## 2020-03-11 PROCEDURE — 2700000000 HC OXYGEN THERAPY PER DAY

## 2020-03-11 PROCEDURE — 5A1955Z RESPIRATORY VENTILATION, GREATER THAN 96 CONSECUTIVE HOURS: ICD-10-PCS | Performed by: INTERNAL MEDICINE

## 2020-03-11 PROCEDURE — 93005 ELECTROCARDIOGRAM TRACING: CPT | Performed by: INTERNAL MEDICINE

## 2020-03-11 PROCEDURE — 71045 X-RAY EXAM CHEST 1 VIEW: CPT

## 2020-03-11 PROCEDURE — 85730 THROMBOPLASTIN TIME PARTIAL: CPT

## 2020-03-11 PROCEDURE — 82803 BLOOD GASES ANY COMBINATION: CPT

## 2020-03-11 PROCEDURE — 85025 COMPLETE CBC W/AUTO DIFF WBC: CPT

## 2020-03-11 PROCEDURE — 87086 URINE CULTURE/COLONY COUNT: CPT

## 2020-03-11 PROCEDURE — 83036 HEMOGLOBIN GLYCOSYLATED A1C: CPT

## 2020-03-11 PROCEDURE — 6360000002 HC RX W HCPCS: Performed by: HOSPITALIST

## 2020-03-11 PROCEDURE — 6370000000 HC RX 637 (ALT 250 FOR IP): Performed by: INTERNAL MEDICINE

## 2020-03-11 PROCEDURE — 94640 AIRWAY INHALATION TREATMENT: CPT

## 2020-03-11 PROCEDURE — 83880 ASSAY OF NATRIURETIC PEPTIDE: CPT

## 2020-03-11 PROCEDURE — 85610 PROTHROMBIN TIME: CPT

## 2020-03-11 PROCEDURE — 6360000002 HC RX W HCPCS: Performed by: INTERNAL MEDICINE

## 2020-03-11 PROCEDURE — 84484 ASSAY OF TROPONIN QUANT: CPT

## 2020-03-11 PROCEDURE — 80053 COMPREHEN METABOLIC PANEL: CPT

## 2020-03-11 PROCEDURE — 36600 WITHDRAWAL OF ARTERIAL BLOOD: CPT

## 2020-03-11 PROCEDURE — 83605 ASSAY OF LACTIC ACID: CPT

## 2020-03-11 PROCEDURE — 2100000000 HC CCU R&B

## 2020-03-11 PROCEDURE — 82947 ASSAY GLUCOSE BLOOD QUANT: CPT

## 2020-03-11 PROCEDURE — C9113 INJ PANTOPRAZOLE SODIUM, VIA: HCPCS | Performed by: INTERNAL MEDICINE

## 2020-03-11 PROCEDURE — 87040 BLOOD CULTURE FOR BACTERIA: CPT

## 2020-03-11 RX ORDER — METHYLPREDNISOLONE SODIUM SUCCINATE 40 MG/ML
40 INJECTION, POWDER, LYOPHILIZED, FOR SOLUTION INTRAMUSCULAR; INTRAVENOUS DAILY
Status: DISCONTINUED | OUTPATIENT
Start: 2020-03-11 | End: 2020-03-14

## 2020-03-11 RX ORDER — PROPOFOL 10 MG/ML
10 INJECTION, EMULSION INTRAVENOUS
Status: DISCONTINUED | OUTPATIENT
Start: 2020-03-11 | End: 2020-03-23

## 2020-03-11 RX ORDER — NICOTINE POLACRILEX 4 MG
15 LOZENGE BUCCAL PRN
Status: DISCONTINUED | OUTPATIENT
Start: 2020-03-11 | End: 2020-03-27 | Stop reason: HOSPADM

## 2020-03-11 RX ORDER — DEXTROSE MONOHYDRATE 25 G/50ML
12.5 INJECTION, SOLUTION INTRAVENOUS PRN
Status: DISCONTINUED | OUTPATIENT
Start: 2020-03-11 | End: 2020-03-27 | Stop reason: HOSPADM

## 2020-03-11 RX ORDER — IPRATROPIUM BROMIDE AND ALBUTEROL SULFATE 2.5; .5 MG/3ML; MG/3ML
1 SOLUTION RESPIRATORY (INHALATION)
Status: DISCONTINUED | OUTPATIENT
Start: 2020-03-11 | End: 2020-03-23

## 2020-03-11 RX ORDER — HEPARIN SODIUM 10000 [USP'U]/100ML
10 INJECTION, SOLUTION INTRAVENOUS CONTINUOUS
Status: DISCONTINUED | OUTPATIENT
Start: 2020-03-11 | End: 2020-03-23

## 2020-03-11 RX ORDER — PANTOPRAZOLE SODIUM 40 MG/10ML
40 INJECTION, POWDER, LYOPHILIZED, FOR SOLUTION INTRAVENOUS DAILY
Status: DISCONTINUED | OUTPATIENT
Start: 2020-03-11 | End: 2020-03-24

## 2020-03-11 RX ORDER — ASPIRIN 81 MG/1
81 TABLET, CHEWABLE ORAL DAILY
Status: DISCONTINUED | OUTPATIENT
Start: 2020-03-11 | End: 2020-03-27 | Stop reason: HOSPADM

## 2020-03-11 RX ORDER — HEPARIN SODIUM 1000 [USP'U]/ML
4000 INJECTION, SOLUTION INTRAVENOUS; SUBCUTANEOUS ONCE
Status: COMPLETED | OUTPATIENT
Start: 2020-03-11 | End: 2020-03-11

## 2020-03-11 RX ORDER — ATORVASTATIN CALCIUM 40 MG/1
40 TABLET, FILM COATED ORAL NIGHTLY
Status: DISCONTINUED | OUTPATIENT
Start: 2020-03-11 | End: 2020-03-27 | Stop reason: HOSPADM

## 2020-03-11 RX ORDER — DOPAMINE HYDROCHLORIDE 160 MG/100ML
5 INJECTION, SOLUTION INTRAVENOUS CONTINUOUS
Status: DISCONTINUED | OUTPATIENT
Start: 2020-03-11 | End: 2020-03-12

## 2020-03-11 RX ORDER — DEXTROSE MONOHYDRATE 50 MG/ML
100 INJECTION, SOLUTION INTRAVENOUS PRN
Status: DISCONTINUED | OUTPATIENT
Start: 2020-03-11 | End: 2020-03-27 | Stop reason: HOSPADM

## 2020-03-11 RX ORDER — HEPARIN SODIUM 1000 [USP'U]/ML
2000 INJECTION, SOLUTION INTRAVENOUS; SUBCUTANEOUS PRN
Status: DISCONTINUED | OUTPATIENT
Start: 2020-03-11 | End: 2020-03-23 | Stop reason: ALTCHOICE

## 2020-03-11 RX ORDER — HEPARIN SODIUM 1000 [USP'U]/ML
4000 INJECTION, SOLUTION INTRAVENOUS; SUBCUTANEOUS PRN
Status: DISCONTINUED | OUTPATIENT
Start: 2020-03-11 | End: 2020-03-23 | Stop reason: ALTCHOICE

## 2020-03-11 RX ADMIN — IPRATROPIUM BROMIDE AND ALBUTEROL SULFATE 1 AMPULE: .5; 3 SOLUTION RESPIRATORY (INHALATION) at 18:30

## 2020-03-11 RX ADMIN — INSULIN LISPRO 1 UNITS: 100 INJECTION, SOLUTION INTRAVENOUS; SUBCUTANEOUS at 22:11

## 2020-03-11 RX ADMIN — PIPERACILLIN SODIUM AND TAZOBACTAM SODIUM 3.38 G: 3; .375 INJECTION, POWDER, LYOPHILIZED, FOR SOLUTION INTRAVENOUS at 20:24

## 2020-03-11 RX ADMIN — DOPAMINE HYDROCHLORIDE 5 MCG/KG/MIN: 160 INJECTION, SOLUTION INTRAVENOUS at 20:55

## 2020-03-11 RX ADMIN — ATORVASTATIN CALCIUM 40 MG: 40 TABLET, FILM COATED ORAL at 18:40

## 2020-03-11 RX ADMIN — PANTOPRAZOLE SODIUM 40 MG: 40 INJECTION, POWDER, FOR SOLUTION INTRAVENOUS at 18:41

## 2020-03-11 RX ADMIN — METHYLPREDNISOLONE SODIUM SUCCINATE 40 MG: 40 INJECTION, POWDER, FOR SOLUTION INTRAMUSCULAR; INTRAVENOUS at 18:41

## 2020-03-11 RX ADMIN — HEPARIN SODIUM 4000 UNITS: 1000 INJECTION INTRAVENOUS; SUBCUTANEOUS at 20:17

## 2020-03-11 RX ADMIN — ASPIRIN 81 MG 81 MG: 81 TABLET ORAL at 18:40

## 2020-03-11 RX ADMIN — HEPARIN SODIUM 12 UNITS/KG/HR: 10000 INJECTION, SOLUTION INTRAVENOUS at 20:24

## 2020-03-11 RX ADMIN — PROPOFOL 30 MCG/KG/MIN: 10 INJECTION, EMULSION INTRAVENOUS at 21:50

## 2020-03-11 ASSESSMENT — PULMONARY FUNCTION TESTS
PIF_VALUE: 20
PIF_VALUE: 19
PIF_VALUE: 20
PIF_VALUE: 19
PIF_VALUE: 19
PIF_VALUE: 20

## 2020-03-11 NOTE — PROGRESS NOTES
Ref Range & Units 03/11/20 1815   pH, Arterial 7.350 - 7.450 7.320Low     pCO2, Arterial 35.0 - 45.0 mmHg 56. 0High     pO2, Arterial 80.0 - 100.0 mmHg 65. 0Low     HCO3, Arterial 22.0 - 26.0 mmol/L 28.9High     Base Excess, Arterial -2.0 - 2.0 mmol/L 1.7    Hemoglobin, Art, Extended 14.0 - 18.0 g/dL 12.7Low     O2 Sat, Arterial >92 % 92.4    Carboxyhgb, Arterial 0.0 - 5.0 % 1.9    Comment:      0.0-1.5   (Smokers 1.5-5.0)    Methemoglobin, Arterial <1.5 % 1.2    O2 Content, Arterial Not Established mL/dL 16.5    O2 Therapy  Unknown    Resulting Agency  [address] [facility]        Specimen Collected: 03/11/20 1815 Last Resulted: 03/11/20 1815 View Other Order Details      Vc,18,600,peep 5, 50%  RIGHT RADIAL AT+

## 2020-03-11 NOTE — H&P
H&P  Chief Complaint:  NSTEMI    History Of Present Illness:   81 y.o. male PMH CAD, COPD presents to OSH with COPD exacerbation and subsequently developed NSTEMI. First troponin at OSH was  0.4 and then second was 4. Pt developed respiratory distress and subsequently intubated. Transferred to [facility] for cardiology evaluation. Upon arrival to CCU intubated, sedated. Review of Systems:   14 ROS completed, all are negative except below:  Unable to obtain, intubated    Past Medical History:        Diagnosis Date    CAD (coronary artery disease)     Chest pain 11/14/2018    [facility]     COPD (chronic obstructive pulmonary disease) ([facility]     Hyperlipidemia     Hypertension     Pneumonia          Procedure Laterality Date    APPENDECTOMY      BACK SURGERY      COLONOSCOPY      ENDOSCOPY, COLON, DIAGNOSTIC           Home Medications:  Prior to Admission medications    Medication Sig Start Date End Date Taking?  Authorizing Provider   isosorbide mononitrate (IMDUR) 30 MG extended release tablet TAKE ONE TABLET BY MOUTH ONCE A DAY *DO NOT TAKE IF SYSTOLIC BLOOD PRESSURE IS LESS THAN 100* 10/3/19   CHALO Lomax   metoprolol succinate (TOPROL XL) 25 MG extended release tablet TAKE 1 TABLET BY MOUTH DAILY 10/3/19   CHALO Coon NP   lisinopril (PRINIVIL;ZESTRIL) 5 MG tablet TAKE 1 TABLET BY MOUTH DAILY 10/3/19   CHALO Coon NP   atorvastatin (LIPITOR) 80 MG tablet TAKE 1 TABLET BY MOUTH ONCE DAILY 7/1/19   Shira Pastor MD   tamsulosin (FLOMAX) 0.4 MG capsule Take 0.4 mg by mouth daily    Historical Provider, MD   guaiFENesin (MUCINEX) 600 MG extended release tablet Take 600 mg by mouth daily    Historical Provider, MD   prasugrel (EFFIENT) 10 MG TABS TAKE 1 TABLET BY MOUTH DAILY 5/29/19   CHALO Lomax   furosemide (LASIX) 20 MG tablet Take 1 tablet by mouth daily as needed (weight gain of 3 lbs overnight or 5 pounds in a week.) 2/11/19   CHALO Rosen   nitroGLYCERIN (NITROSTAT) 0.4 MG SL tablet Place 1 tablet under the tongue every 5 minutes as needed for Chest pain 8/28/18   CHALO Kumar   aspirin 81 MG chewable tablet Take 1 tablet by mouth daily 6/8/18   Loreta Camargo MD   ipratropium-albuterol (DUONEB) 0.5-2.5 (3) MG/3ML SOLN nebulizer solution Inhale 3 mLs into the lungs every 4 hours (while awake) 6/7/18   Loreta Camargo MD         Social History:    Tobacco:   reports that he quit smoking about 21 months ago. His smoking use included cigarettes. He has a 50.00 pack-year smoking history. His smokeless tobacco use includes chew. Alcohol:   reports previous alcohol use. Illicit Drugs: Denies use      Family History:      Problem Relation Age of Onset   [place] Cancer Sister     Cancer Brother          Allergies:    Patient has no known allergies. Physical Examination:  There were no vitals taken for this visit. Constitutional - Appropriately groomed, good nutritional status  Psychiatric - Sedated  Eyes - EOMI, conjunctivae and lids intact  ENMT - lips, teeth, gums intact; hearing intact  Respiratory - Coarse BS bilaterally  Cardiovascular - Clear S1, S2 no gross m/r/g; pedal pulses intact  Abd - Soft, non-tender; No gross hernia  MSK - UE and LE joints intact, no gross clubbing  Skin - No rashes or wounds; no gross capillary refill defects  Neurologic - Sedated      Labs/Diagnostic imaging:  Pending    Problem list:  Active Problems:    CAD (coronary artery disease)    COPD exacerbation (HCC)    Hypertension    NSTEMI (non-ST elevated myocardial infarction) ([facility]  Resolved Problems:    * No resolved hospital problems.  *        IMPRESSION / PLAN:  []NSTEMI, COPD exacerbation, acute on chronic hypoxic respiratory failure  -Cardiology, pulmonary consulted  Labs, CXR pending  -Hep drip, ASA, statin ordered  -EKG pending  -Continue MV, abg pending, continue nebs, abs, steroids      []Hospital issues:  DVT prop - SCDs  Code - FULL  Disposition - Admit CCU    Danika Gaston M.D.,  3/11/2020

## 2020-03-12 ENCOUNTER — OUTSIDE FACILITY SERVICE (OUTPATIENT)
Dept: PULMONOLOGY | Facility: CLINIC | Age: 70
End: 2020-03-12

## 2020-03-12 ENCOUNTER — APPOINTMENT (OUTPATIENT)
Dept: GENERAL RADIOLOGY | Age: 70
DRG: 207 | End: 2020-03-12
Attending: INTERNAL MEDICINE
Payer: MEDICARE

## 2020-03-12 PROBLEM — Z51.5 PALLIATIVE CARE PATIENT: Status: ACTIVE | Noted: 2020-03-12

## 2020-03-12 LAB
ALBUMIN SERPL-MCNC: 2.9 G/DL (ref 3.5–5.2)
ALP BLD-CCNC: 60 U/L (ref 40–130)
ALT SERPL-CCNC: 18 U/L (ref 5–41)
ANION GAP SERPL CALCULATED.3IONS-SCNC: 17 MMOL/L (ref 7–19)
APTT: 162.2 SEC (ref 26–36.2)
APTT: 61.1 SEC (ref 26–36.2)
APTT: 77.7 SEC (ref 26–36.2)
APTT: 91.2 SEC (ref 26–36.2)
AST SERPL-CCNC: 23 U/L (ref 5–40)
BASE EXCESS ARTERIAL: 2.6 MMOL/L (ref -2–2)
BASOPHILS ABSOLUTE: 0 K/UL (ref 0–0.2)
BASOPHILS RELATIVE PERCENT: 0.2 % (ref 0–1)
BILIRUB SERPL-MCNC: 0.3 MG/DL (ref 0.2–1.2)
BUN BLDV-MCNC: 26 MG/DL (ref 8–23)
CALCIUM SERPL-MCNC: 8.5 MG/DL (ref 8.8–10.2)
CARBOXYHEMOGLOBIN ARTERIAL: 1.5 % (ref 0–5)
CHLORIDE BLD-SCNC: 103 MMOL/L (ref 98–111)
CHOLESTEROL, TOTAL: 159 MG/DL (ref 160–199)
CO2: 20 MMOL/L (ref 22–29)
CREAT SERPL-MCNC: 1.2 MG/DL (ref 0.5–1.2)
D DIMER: 0.72 UG/ML FEU (ref 0–0.48)
EKG P AXIS: 66 DEGREES
EKG P-R INTERVAL: 140 MS
EKG Q-T INTERVAL: 436 MS
EKG QRS DURATION: 140 MS
EKG QTC CALCULATION (BAZETT): 467 MS
EKG T AXIS: 34 DEGREES
EOSINOPHILS ABSOLUTE: 0 K/UL (ref 0–0.6)
EOSINOPHILS RELATIVE PERCENT: 0 % (ref 0–5)
GFR NON-AFRICAN AMERICAN: >60
GLUCOSE BLD-MCNC: 101 MG/DL (ref 70–99)
GLUCOSE BLD-MCNC: 116 MG/DL (ref 70–99)
GLUCOSE BLD-MCNC: 143 MG/DL (ref 74–109)
HCO3 ARTERIAL: 27.2 MMOL/L (ref 22–26)
HCT VFR BLD CALC: 40.8 % (ref 42–52)
HDLC SERPL-MCNC: 64 MG/DL (ref 55–121)
HEMOGLOBIN, ART, EXTENDED: 12.4 G/DL (ref 14–18)
HEMOGLOBIN: 12.6 G/DL (ref 14–18)
IMMATURE GRANULOCYTES #: 0.1 K/UL
INR BLD: 1.11 (ref 0.88–1.18)
LDL CHOLESTEROL CALCULATED: 85 MG/DL
LYMPHOCYTES ABSOLUTE: 0.9 K/UL (ref 1.1–4.5)
LYMPHOCYTES RELATIVE PERCENT: 6.7 % (ref 20–40)
MAGNESIUM: 2.7 MG/DL (ref 1.6–2.4)
MCH RBC QN AUTO: 28.3 PG (ref 27–31)
MCHC RBC AUTO-ENTMCNC: 30.9 G/DL (ref 33–37)
MCV RBC AUTO: 91.5 FL (ref 80–94)
METHEMOGLOBIN ARTERIAL: 1.2 %
MONOCYTES ABSOLUTE: 0.5 K/UL (ref 0–0.9)
MONOCYTES RELATIVE PERCENT: 3.6 % (ref 0–10)
NEUTROPHILS ABSOLUTE: 11.8 K/UL (ref 1.5–7.5)
NEUTROPHILS RELATIVE PERCENT: 88.7 % (ref 50–65)
O2 CONTENT ARTERIAL: 17 ML/DL
O2 SAT, ARTERIAL: 96.8 %
O2 THERAPY: ABNORMAL
PCO2 ARTERIAL: 41 MMHG (ref 35–45)
PDW BLD-RTO: 16.3 % (ref 11.5–14.5)
PERFORMED ON: ABNORMAL
PERFORMED ON: ABNORMAL
PH ARTERIAL: 7.43 (ref 7.35–7.45)
PLATELET # BLD: 162 K/UL (ref 130–400)
PMV BLD AUTO: 9.7 FL (ref 9.4–12.4)
PO2 ARTERIAL: 116 MMHG (ref 80–100)
POTASSIUM SERPL-SCNC: 4 MMOL/L (ref 3.5–5)
POTASSIUM, WHOLE BLOOD: 3.8
PROTHROMBIN TIME: 14.3 SEC (ref 12–14.6)
RAPID INFLUENZA  B AGN: NEGATIVE
RAPID INFLUENZA A AGN: NEGATIVE
RBC # BLD: 4.46 M/UL (ref 4.7–6.1)
REASON FOR REJECTION: NORMAL
REJECTED TEST: NORMAL
SODIUM BLD-SCNC: 140 MMOL/L (ref 136–145)
TOTAL PROTEIN: 6 G/DL (ref 6.6–8.7)
TRIGL SERPL-MCNC: 52 MG/DL (ref 0–149)
TROPONIN: 0.1 NG/ML (ref 0–0.03)
TROPONIN: 0.15 NG/ML (ref 0–0.03)
WBC # BLD: 13.3 K/UL (ref 4.8–10.8)

## 2020-03-12 PROCEDURE — 87804 INFLUENZA ASSAY W/OPTIC: CPT

## 2020-03-12 PROCEDURE — 71045 X-RAY EXAM CHEST 1 VIEW: CPT

## 2020-03-12 PROCEDURE — 85379 FIBRIN DEGRADATION QUANT: CPT

## 2020-03-12 PROCEDURE — 94003 VENT MGMT INPAT SUBQ DAY: CPT

## 2020-03-12 PROCEDURE — 6370000000 HC RX 637 (ALT 250 FOR IP): Performed by: INTERNAL MEDICINE

## 2020-03-12 PROCEDURE — 2100000000 HC CCU R&B

## 2020-03-12 PROCEDURE — 85610 PROTHROMBIN TIME: CPT

## 2020-03-12 PROCEDURE — 87070 CULTURE OTHR SPECIMN AEROBIC: CPT

## 2020-03-12 PROCEDURE — 2700000000 HC OXYGEN THERAPY PER DAY

## 2020-03-12 PROCEDURE — 93308 TTE F-UP OR LMTD: CPT

## 2020-03-12 PROCEDURE — 2580000003 HC RX 258: Performed by: INTERNAL MEDICINE

## 2020-03-12 PROCEDURE — 94640 AIRWAY INHALATION TREATMENT: CPT

## 2020-03-12 PROCEDURE — 99223 1ST HOSP IP/OBS HIGH 75: CPT | Performed by: INTERNAL MEDICINE

## 2020-03-12 PROCEDURE — 85025 COMPLETE CBC W/AUTO DIFF WBC: CPT

## 2020-03-12 PROCEDURE — 82947 ASSAY GLUCOSE BLOOD QUANT: CPT

## 2020-03-12 PROCEDURE — C9113 INJ PANTOPRAZOLE SODIUM, VIA: HCPCS | Performed by: INTERNAL MEDICINE

## 2020-03-12 PROCEDURE — 82803 BLOOD GASES ANY COMBINATION: CPT

## 2020-03-12 PROCEDURE — 93010 ELECTROCARDIOGRAM REPORT: CPT | Performed by: INTERNAL MEDICINE

## 2020-03-12 PROCEDURE — 87205 SMEAR GRAM STAIN: CPT

## 2020-03-12 PROCEDURE — 36415 COLL VENOUS BLD VENIPUNCTURE: CPT

## 2020-03-12 PROCEDURE — 85730 THROMBOPLASTIN TIME PARTIAL: CPT

## 2020-03-12 PROCEDURE — 36600 WITHDRAWAL OF ARTERIAL BLOOD: CPT

## 2020-03-12 PROCEDURE — 80061 LIPID PANEL: CPT

## 2020-03-12 PROCEDURE — 84132 ASSAY OF SERUM POTASSIUM: CPT

## 2020-03-12 PROCEDURE — 80053 COMPREHEN METABOLIC PANEL: CPT

## 2020-03-12 PROCEDURE — 83735 ASSAY OF MAGNESIUM: CPT

## 2020-03-12 PROCEDURE — 6360000002 HC RX W HCPCS: Performed by: INTERNAL MEDICINE

## 2020-03-12 PROCEDURE — 6360000002 HC RX W HCPCS: Performed by: HOSPITALIST

## 2020-03-12 PROCEDURE — 84484 ASSAY OF TROPONIN QUANT: CPT

## 2020-03-12 RX ORDER — MORPHINE SULFATE 4 MG/ML
2 INJECTION, SOLUTION INTRAMUSCULAR; INTRAVENOUS
Status: DISCONTINUED | OUTPATIENT
Start: 2020-03-12 | End: 2020-03-27 | Stop reason: HOSPADM

## 2020-03-12 RX ADMIN — PIPERACILLIN SODIUM AND TAZOBACTAM SODIUM 3.38 G: 3; .375 INJECTION, POWDER, LYOPHILIZED, FOR SOLUTION INTRAVENOUS at 04:50

## 2020-03-12 RX ADMIN — PANTOPRAZOLE SODIUM 40 MG: 40 INJECTION, POWDER, FOR SOLUTION INTRAVENOUS at 09:40

## 2020-03-12 RX ADMIN — PROPOFOL 35 MCG/KG/MIN: 10 INJECTION, EMULSION INTRAVENOUS at 08:54

## 2020-03-12 RX ADMIN — ASPIRIN 81 MG 81 MG: 81 TABLET ORAL at 09:40

## 2020-03-12 RX ADMIN — IPRATROPIUM BROMIDE AND ALBUTEROL SULFATE 1 AMPULE: .5; 3 SOLUTION RESPIRATORY (INHALATION) at 14:31

## 2020-03-12 RX ADMIN — ATORVASTATIN CALCIUM 40 MG: 40 TABLET, FILM COATED ORAL at 20:46

## 2020-03-12 RX ADMIN — PROPOFOL 35 MCG/KG/MIN: 10 INJECTION, EMULSION INTRAVENOUS at 20:46

## 2020-03-12 RX ADMIN — MORPHINE SULFATE 2 MG: 4 INJECTION INTRAVENOUS at 15:21

## 2020-03-12 RX ADMIN — METOPROLOL TARTRATE 25 MG: 25 TABLET, FILM COATED ORAL at 17:22

## 2020-03-12 RX ADMIN — PROPOFOL 30 MCG/KG/MIN: 10 INJECTION, EMULSION INTRAVENOUS at 14:54

## 2020-03-12 RX ADMIN — METHYLPREDNISOLONE SODIUM SUCCINATE 40 MG: 40 INJECTION, POWDER, FOR SOLUTION INTRAMUSCULAR; INTRAVENOUS at 18:49

## 2020-03-12 RX ADMIN — IPRATROPIUM BROMIDE AND ALBUTEROL SULFATE 1 AMPULE: .5; 3 SOLUTION RESPIRATORY (INHALATION) at 18:06

## 2020-03-12 RX ADMIN — PIPERACILLIN SODIUM AND TAZOBACTAM SODIUM 3.38 G: 3; .375 INJECTION, POWDER, LYOPHILIZED, FOR SOLUTION INTRAVENOUS at 20:46

## 2020-03-12 RX ADMIN — IPRATROPIUM BROMIDE AND ALBUTEROL SULFATE 1 AMPULE: .5; 3 SOLUTION RESPIRATORY (INHALATION) at 06:55

## 2020-03-12 RX ADMIN — PIPERACILLIN SODIUM AND TAZOBACTAM SODIUM 3.38 G: 3; .375 INJECTION, POWDER, LYOPHILIZED, FOR SOLUTION INTRAVENOUS at 16:29

## 2020-03-12 RX ADMIN — IPRATROPIUM BROMIDE AND ALBUTEROL SULFATE 1 AMPULE: .5; 3 SOLUTION RESPIRATORY (INHALATION) at 10:16

## 2020-03-12 ASSESSMENT — PULMONARY FUNCTION TESTS
PIF_VALUE: 39
PIF_VALUE: 38
PIF_VALUE: 37
PIF_VALUE: 34
PIF_VALUE: 34
PIF_VALUE: 32
PIF_VALUE: 25
PIF_VALUE: 39
PIF_VALUE: 34
PIF_VALUE: 25
PIF_VALUE: 38
PIF_VALUE: 29
PIF_VALUE: 37
PIF_VALUE: 27
PIF_VALUE: 38
PIF_VALUE: 35
PIF_VALUE: 33
PIF_VALUE: 28
PIF_VALUE: 36
PIF_VALUE: 19
PIF_VALUE: 20
PIF_VALUE: 36
PIF_VALUE: 37
PIF_VALUE: 20
PIF_VALUE: 36
PIF_VALUE: 22

## 2020-03-12 ASSESSMENT — ENCOUNTER SYMPTOMS
SHORTNESS OF BREATH: 0
GASTROINTESTINAL NEGATIVE: 1
EYES NEGATIVE: 1
DIARRHEA: 0
VOMITING: 0
RESPIRATORY NEGATIVE: 1
NAUSEA: 0

## 2020-03-12 ASSESSMENT — PAIN SCALES - GENERAL
PAINLEVEL_OUTOF10: 0
PAINLEVEL_OUTOF10: 0
PAINLEVEL_OUTOF10: 3
PAINLEVEL_OUTOF10: 0

## 2020-03-12 NOTE — CONSULTS
 Hypertension     Palliative care patient 2020    Pneumonia         Past Surgical History:  Past Surgical History:   Procedure Laterality Date    APPENDECTOMY      BACK SURGERY      COLONOSCOPY      ENDOSCOPY, COLON, DIAGNOSTIC         Past Family History:  Family History   Problem Relation Age of Onset    Cancer Sister     Cancer Brother        Past Social History:  Social History     Socioeconomic History    Marital status:      Spouse name: Cristal Damian    Number of children: 3    Years of education: 8    Highest education level: Not on file   Occupational History    Not on file   Social Needs    Financial resource strain: Not on file    Food insecurity     Worry: Not on file     Inability: Not on file   [employer] needs     Medical: Not on file     Non-medical: Not on file   Tobacco Use    Smoking status: Former Smoker     Packs/day: 1.00     Years: 50.00     Pack years: 50.00     Types: Cigarettes     Last attempt to quit: 2018     Years since quittin.7    Smokeless tobacco: Current User     Types: Chew   Substance and Sexual Activity    Alcohol use: Not Currently     Frequency: Never     Comment: occassional    Drug use: No    Sexual activity: Not on file   Lifestyle    Physical activity     Days per week: Not on file     Minutes per session: Not on file    Stress: Not on file   Relationships    Social connections     Talks on phone: Not on file     Gets together: Not on file     Attends [employer]ism service: Not on file     Active member of club or organization: Not on file     Attends meetings of clubs or organizations: Not on file     Relationship status: Not on file    Intimate partner violence     Fear of current or ex partner: Not on file     Emotionally abused: Not on file     Physically abused: Not on file     Forced sexual activity: Not on file   Other Topics Concern    Not on file   Social History Narrative     50 years only marriage    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  Authorizing Provider   isosorbide mononitrate (IMDUR) 30 MG extended release tablet TAKE ONE TABLET BY MOUTH ONCE A DAY *DO NOT TAKE IF SYSTOLIC BLOOD PRESSURE IS LESS THAN 100* 10/3/19   CHALO Camacho   metoprolol succinate (TOPROL XL) 25 MG extended release tablet TAKE 1 TABLET BY MOUTH DAILY 10/3/19   Santina Councilman, APRN - NP   lisinopril (PRINIVIL;ZESTRIL) 5 MG tablet TAKE 1 TABLET BY MOUTH DAILY 10/3/19   Santina Councilman, APRN - NP   atorvastatin (LIPITOR) 80 MG tablet TAKE 1 TABLET BY MOUTH ONCE DAILY 7/1/19   Padmaja Martins MD   tamsulosin (FLOMAX) 0.4 MG capsule Take 0.4 mg by mouth daily    Historical Provider, MD   guaiFENesin (MUCINEX) 600 MG extended release tablet Take 600 mg by mouth daily    Historical Provider, MD   prasugrel (EFFIENT) 10 MG TABS TAKE 1 TABLET BY MOUTH DAILY 5/29/19   CHALO Camacho   furosemide (LASIX) 20 MG tablet Take 1 tablet by mouth daily as needed (weight gain of 3 lbs overnight or 5 pounds in a week.) 2/11/19   CHALO Camacho   nitroGLYCERIN (NITROSTAT) 0.4 MG SL tablet Place 1 tablet under the tongue every 5 minutes as needed for Chest pain 8/28/18   CHALO Giron   aspirin 81 MG chewable tablet Take 1 tablet by mouth daily 6/8/18   Kristin Blackwell MD   ipratropium-albuterol (DUONEB) 0.5-2.5 (3) MG/3ML SOLN nebulizer solution Inhale 3 mLs into the lungs every 4 hours (while awake) 6/7/18   Kristin Blackwell MD       Current Meds:   metoprolol tartrate  25 mg Oral BID    pantoprazole  40 mg Intravenous Daily    insulin lispro Chest wall: No tenderness. Abdominal:      General: Abdomen is flat. Bowel sounds are normal. There is no distension. Palpations: Abdomen is soft. There is no mass. Tenderness: There is no abdominal tenderness. There is no right CVA tenderness, left CVA tenderness, guarding or rebound. Hernia: No hernia is present. Musculoskeletal:         General: No swelling. Lymphadenopathy:      Cervical: No cervical adenopathy. Skin:     General: Skin is warm and dry. Neurological:      General: No focal deficit present. Mental Status: He is oriented to person, place, and time. Mental status is at baseline. Cranial Nerves: No cranial nerve deficit. Sensory: No sensory deficit. Motor: No weakness. Coordination: Coordination normal.      Gait: Gait normal.      Deep Tendon Reflexes: Reflexes normal.   Psychiatric:         Mood and Affect: Mood normal.         Behavior: Behavior normal.         Thought Content: Thought content normal.         Judgment: Judgment normal.         Labs:  Recent Labs     03/11/20 1757 03/12/20  0115   WBC 10.4 13.3*   HGB 12.5* 12.6*    162       Recent Labs     03/11/20 1757 03/11/20  1815 03/12/20  0115 03/12/20  0429     --  140  --    K 4.2 4.3 4.0 3.8     --  103  --    CO2 25  --  20*  --    BUN 22  --  26*  --    CREATININE 1.0  --  1.2  --    LABGLOM >60  --  >60  --    MG 2.4  --  2.7*  --    CALCIUM 8.2*  --  8.5*  --        CK, CKMB, Troponin: @LABRCNT (CKTOTAL:3, CKMB:3, TROPONINI:3)@    Last 3 BNP:  No results for input(s): BNP in the last 72 hours. IMAGING:  Xr Chest Portable    Result Date: 3/12/2020  EXAMINATION: XR CHEST PORTABLE 3/12/2020 7:19 AM HISTORY: XR CHEST PORTABLE 3/12/2020 2:00 AM HISTORY: Intubated COMPARISON: March 11, 2020. FINDINGS: Hyperinflation is present. No infiltrates or consolidation is identified. Cardiac silhouettes normal. Endotracheal tube is present. . The osseous structures and

## 2020-03-12 NOTE — PROGRESS NOTES
Pharmacy Renal Adjustment    Colin Haddad is a 81 y.o. male. Pharmacy has renally adjusted medications per protocol. Recent Labs     03/11/20  1757   BUN 22       Recent Labs     03/11/20  1757   CREATININE 1.0       Estimated Creatinine Clearance: 91 mL/min (based on SCr of 1 mg/dL). Height:   Ht Readings from Last 1 Encounters:   03/11/20 6' (1.829 m)     Weight:  Wt Readings from Last 1 Encounters:   03/11/20 236 lb (107 kg)     Plan: Adjust the following medications based on renal function and indication:           Zosyn to 3.375 gm IV every 8 hours (extended interval dosing).     Electronically signed by Gerhard Azul PharmD, BCPS on 3/11/2020 at 7:32 PM

## 2020-03-12 NOTE — CONSULTS
smoking use included cigarettes. He has a 50.00 pack-year smoking history. His smokeless tobacco use includes chew. He reports previous alcohol use. He reports that he does not use drugs. Family History  family history includes Cancer in his brother and sister. Review of Systems:  Cannot obtain due to mechanical ventilation  Physical Exam:   height is 6' (1.829 m) and weight is 227 lb 15.3 oz (103.4 kg). His temporal temperature is 96.8 °F (36 °C). His blood pressure is 117/65 and his pulse is 78. His respiration is 24 and oxygen saturation is 96%. Intake/Output Summary (Last 24 hours) at 3/12/2020 0956  Last data filed at 3/12/2020 0855  Gross per 24 hour   Intake 385.69 ml   Output 1080 ml   Net -694.31 ml     Ventilator Settings:     Vent Mode: AC/VC/Vt Ordered: 600 mL/Rate Set: 18 bmp/ /PEEP/CPAP: 5/FiO2 : 40 %/   Peak Inspiratory Pressure: 28 cmH2O  Mean Airway Pressure: 14 cmH20  I:E Ratio: 1.40:1  Rate Measured: 23 br/min  Minute Volume: 13 Liters           Physical Exam  Constitutional:       General: He is not in acute distress. Appearance: He is ill-appearing. He is not toxic-appearing or diaphoretic. Interventions: He is sedated and intubated. HENT:      Head: Normocephalic and atraumatic. Nose: Nose normal.   Eyes:      General: No scleral icterus. Cardiovascular:      Rate and Rhythm: Normal rate. Heart sounds: No murmur. No friction rub. No gallop. Pulmonary:      Effort: Tachypnea present. No accessory muscle usage or respiratory distress. He is intubated. Breath sounds: No decreased air movement. Decreased breath sounds present. No wheezing. Abdominal:      Palpations: Abdomen is soft. Skin:     General: Skin is warm and dry. Neurological:      Mental Status: He is lethargic.       Comments: sedated       Recent laboratory:  Recent Labs     03/11/20  1757 03/12/20  0115   WBC 10.4 13.3*   RBC 4.40* 4.46*   HGB 12.5* 12.6*   HCT 39.1* 40.8*    162   MCV 88.9 91.5   MCH 28.4 28.3   MCHC 32.0* 30.9*   RDW 15.9* 16.3*      Recent Labs     03/11/20 1757 03/11/20 1815 03/12/20 0115 03/12/20  0429     --  140  --    K 4.2 4.3 4.0 3.8     --  103  --    CO2 25  --  20*  --    BUN 22  --  26*  --    CREATININE 1.0  --  1.2  --    CALCIUM 8.2*  --  8.5*  --    GLUCOSE 163*  --  143*  --       Recent Labs     03/11/20 1815 03/12/20  0429   PHART 7.320* 7.430   [id number] 56.0* 41.0   PO2ART 65.0* 116.0*   PGS8CGK 28.9* 27.2*   [id number] 92.4 96.8   BEART 1.7 2.6*     Recent Labs     03/11/20 1757 03/12/20 0115   AST 25 23   ALT 20 18   ALKPHOS 66 60   BILITOT 0.4 0.3   MG 2.4 2.7*   CALCIUM 8.2* 8.5*   TROPONINI 0.18* 0.15*   LACTA 0.8  --    INR 0.98 1.11     No results for input(s): BC, LABGRAM, CULTRESP, BFCX in the last 72 hours. Radiograph  Xr Chest Portable    Result Date: 3/12/2020  EXAMINATION: XR CHEST PORTABLE 3/12/2020 7:19 AM HISTORY: XR CHEST PORTABLE 3/12/2020 2:00 AM HISTORY: Intubated COMPARISON: March 11, 2020. FINDINGS: Hyperinflation is present. No infiltrates or consolidation is identified. Cardiac silhouettes normal. Endotracheal tube is present. . The osseous structures and surrounding soft tissues demonstrate no acute abnormality. 1. COPD no acute cardiopulmonary process. Signed by Dr Hao Orozco on 3/12/2020 7:20 AM    Xr Chest Portable    Result Date: 3/11/2020  XR CHEST PORTABLE 3/11/2020 4:45 PM HISTORY:   Respiratory distress  Single view. COMPARISONS:  4/17/2019 chest radiography FINDINGS: Endotracheal tube position above the marcella. NG tube looped in the proximal stomach. The lungs are clear without parenchymal infiltrates. The heart is normal in size, pulmonary circulation appropriate, without heart failure. The bony structures are intact. 1. Line and support tubes well-positioned. 2. No acute cardiopulmonary process.  Signed by Dr Kaykay Gay on 3/11/2020 7:07 PM    My radiograph interpretation/independent review of imaging: ET tube in place. Hyperinflation noted. No infiltrates or consolidation. Other test results (not lab or imaging): Echo ordered and pending. PFT 2018 showed severe airflow obstruction with significant percentage increase Post BD with insignificant increase in FEV1 to 25% predicted leaving residual, very severe airflow obstruction. Mid flows severely reduced. Max vent reduced. Lung volumes with coexisting restrictive process, some gas trapping. Diffusion capacity severely reduced. Independent review of ekg: NSR 78 BPM, RBBB   Problem list as generated by [facility]:  Patient Active Problem List   Diagnosis    CAD (coronary artery disease)    Acute ST elevation myocardial infarction (STEMI) involving left anterior descending (LAD) coronary artery (HCC)    Electronic cigarette use    [facility]    Chest pain    Syncope and collapse    COPD exacerbation [facility]    Hypertension    NSTEMI (non-ST elevated myocardial infarction) ([facility] [address] [facility]    Palliative care patient     Pulmonary Assessment:  SEVERE ACUTE RESPIRATORY FAILURE REQUIRING MECHANICAL VENTILATION  This is a threat to life or pulmonary function, high risk, due to NSTEMI    New problem (to me), with additional workup planned:   1. Acute on chronic hypoxic respiratory failure   New problem (to me), no additional workup planned:   1. none  Other problems either stable, failing to improve or worsenin. Severe, stage III COPD with coexisting restrictive disease and severely reduced diffusion capacity. 2. Coronary artery disease   3. Congestive heart failure   4.  NSTEMI     Recommend/plan:   Ventilation order set, including intravenous narcotics and benzodiazepines (ie controlled drugs) for sedation and ventilator tolerance  Arterial blood gas daily on vent  Chest X-ray daily on vent   · Continue bronchodilators  · Continue IV solu medrol   · Protonix for GI prophylaxis   · Patient on Heparin drip  · Antibiotics per attending     Thank you for this consult. We will follow along. Electronically signed by Noa Bird on 3/12/2020 at 9:56 AM    Physician Substantive portion:  Pt is without complaints, sedated on ventilator, with hx copd, s/p NSTEMI. ABG shows improved respiratory acidosis. Reassess tomorrow for spontaneous breathing trials. In meantime continue sedation for ventilator tolerance and synchrony. Add morphine prn for analgesia. I have seen and examined patient personally, performing a face-to-face diagnostic evaluation with plan of care reviewed and developed with APRN and nursing staff. I have addended and/or modified the above history of present illness, physical examination, and assessment and plan to reflect my findings and impressions. Essential elements of the care plan were discussed with APRN above. Agree with findings and assessment/plan as documented above.     Electronically signed by Noelle Gonzales, on 3/12/2020, 11:35 AM

## 2020-03-12 NOTE — PROGRESS NOTES
Visited with pt's family to provide spiritual care. Pt's wife and two daughters were present during visit. Pt had been trans from [facility] and is on the ventilator. Provided spiritual care with sustaining presence, nurtured hope, and prayer. Pt's family expressed gratitude for spiritual care.      Electronically signed by Leeann Eid on 3/12/2020 at 2:46 PM

## 2020-03-12 NOTE — CONSULTS
Palliative Care:  Pt is new to palliative care. He is currently sedated on vent. Pt spouse and dtr present. Pt trans from [facility] for higher level care. Past Medical History:        Past Medical History:   Diagnosis Date    CAD (coronary artery disease)     Chest pain 11/14/2018    [facility]     COPD [facility]chronic obstructive pulmonary disease) [facility]     Hyperlipidemia     Hypertension     Palliative care patient 03/12/2020    Pneumonia        Advance Directives:   DNR-CC Spouse states she and pt discussed his wishes and he would not want to be ventilated long term. Pain/Other Symptoms:  Sedated      Activity:   On vent          Psychological/Spiritual:  Good family/spiritual support. PC  will visit with family today. Plan:  Med management      Patient/family discussion r/t goals:     Per nurse and family report. Pt was admitted and intubated at [facility] on 3/10. Pt is currently sedated on propofol. Spouse tells me previous admission, prior to 3/10, pt was evaluated for home O2. Currently on 2L at home. Spouse tells me pt needs max assist at home with all ADL's. She also reports pt has all equip he needs in the home(bed, O2, walker, BSC, shower chair, canes, w/c). Pt also has nebs  along with rescue inhalers. Spouse also reports that pt was eval for CABG after stents in 2018. Pt was not a surgical candidate d/t his COPD. Spouse talks with me about decision to extubate if pt does not do well. She reports,\"we have talked about this and he did not want to stay on vent\" she also adds \"This is not quality of life\". PC nurse explained should pt not improve we could discuss hospice for palliative extubation. She voiced understanding and agreement. Palliative care will follow for continued support and goals of care discussions.     Review of any needed services:  No new needs voiced at this time.            Electronically signed by Mame Castorena RN on 3/12/2020 at 11:41 AM

## 2020-03-12 NOTE — PROGRESS NOTES
Ref Range & Units 03/12/20 0429   pH, Arterial 7.350 - 7.450 7.430    pCO2, Arterial 35.0 - 45.0 mmHg 41.0    pO2, Arterial 80.0 - 100.0 mmHg 116. 0High     HCO3, Arterial 22.0 - 26.0 mmol/L 27.2High     Base Excess, Arterial -2.0 - 2.0 mmol/L 2.6High     Hemoglobin, Art, Extended 14.0 - 18.0 g/dL 12.4Low     O2 Sat, Arterial >92 % 96.8    Carboxyhgb, Arterial 0.0 - 5.0 % 1.5    Comment:      0.0-1.5   (Smokers 1.5-5.0)    Methemoglobin, Arterial <1.5 % 1.2    O2 Content, Arterial Not Established mL/dL 17.0    O2 Therapy  Unknown    Resulting Agency  [facility]        Specimen Collected: 03/12/20 0429 Last Resulted: 03/12/20 0430 View Other Order Details        VC,18,600,PEEP 5, 50%  RIGHT RADIAL AT+

## 2020-03-12 NOTE — PROGRESS NOTES
Pt became very anxious, frantically tried bringing his hands up to the ET tube and started breathing at 40 BPM. Tried calming the patient for several minutes before increasing his propofol to 35 mcg/kg/min. Pt finally began to relax shortly after.

## 2020-03-12 NOTE — PROGRESS NOTES
GCS 3, MELE notified at this time.  Electronically signed by Miguel Leonardo RN on 3/11/2020 at 10:48 PM

## 2020-03-13 ENCOUNTER — OUTSIDE FACILITY SERVICE (OUTPATIENT)
Dept: PULMONOLOGY | Facility: CLINIC | Age: 70
End: 2020-03-13

## 2020-03-13 ENCOUNTER — APPOINTMENT (OUTPATIENT)
Dept: CT IMAGING | Age: 70
DRG: 207 | End: 2020-03-13
Attending: INTERNAL MEDICINE
Payer: MEDICARE

## 2020-03-13 ENCOUNTER — APPOINTMENT (OUTPATIENT)
Dept: GENERAL RADIOLOGY | Age: 70
DRG: 207 | End: 2020-03-13
Attending: INTERNAL MEDICINE
Payer: MEDICARE

## 2020-03-13 LAB
ALBUMIN SERPL-MCNC: 2.7 G/DL (ref 3.5–5.2)
ALP BLD-CCNC: 53 U/L (ref 40–130)
ALT SERPL-CCNC: 15 U/L (ref 5–41)
ANION GAP SERPL CALCULATED.3IONS-SCNC: 11 MMOL/L (ref 7–19)
APTT: 60 SEC (ref 26–36.2)
APTT: 60.1 SEC (ref 26–36.2)
APTT: 61.4 SEC (ref 26–36.2)
APTT: 63 SEC (ref 26–36.2)
AST SERPL-CCNC: 16 U/L (ref 5–40)
BASE EXCESS ARTERIAL: 4.2 MMOL/L (ref -2–2)
BASOPHILS ABSOLUTE: 0 K/UL (ref 0–0.2)
BASOPHILS RELATIVE PERCENT: 0.1 % (ref 0–1)
BILIRUB SERPL-MCNC: 0.3 MG/DL (ref 0.2–1.2)
BUN BLDV-MCNC: 29 MG/DL (ref 8–23)
CALCIUM SERPL-MCNC: 8.1 MG/DL (ref 8.8–10.2)
CARBOXYHEMOGLOBIN ARTERIAL: 1.6 % (ref 0–5)
CHLORIDE BLD-SCNC: 104 MMOL/L (ref 98–111)
CO2: 25 MMOL/L (ref 22–29)
CREAT SERPL-MCNC: 0.9 MG/DL (ref 0.5–1.2)
EKG P AXIS: 60 DEGREES
EKG P-R INTERVAL: 140 MS
EKG Q-T INTERVAL: 524 MS
EKG QRS DURATION: 126 MS
EKG QTC CALCULATION (BAZETT): 512 MS
EKG T AXIS: 65 DEGREES
EOSINOPHILS ABSOLUTE: 0 K/UL (ref 0–0.6)
EOSINOPHILS RELATIVE PERCENT: 0 % (ref 0–5)
GFR NON-AFRICAN AMERICAN: >60
GLUCOSE BLD-MCNC: 103 MG/DL (ref 70–99)
GLUCOSE BLD-MCNC: 127 MG/DL (ref 74–109)
GLUCOSE BLD-MCNC: 130 MG/DL (ref 70–99)
GLUCOSE BLD-MCNC: 76 MG/DL (ref 70–99)
GLUCOSE BLD-MCNC: 91 MG/DL (ref 70–99)
HCO3 ARTERIAL: 27.3 MMOL/L (ref 22–26)
HCT VFR BLD CALC: 36.3 % (ref 42–52)
HEMOGLOBIN, ART, EXTENDED: 12.1 G/DL (ref 14–18)
HEMOGLOBIN: 11.4 G/DL (ref 14–18)
IMMATURE GRANULOCYTES #: 0.1 K/UL
INR BLD: 1.03 (ref 0.88–1.18)
LYMPHOCYTES ABSOLUTE: 0.9 K/UL (ref 1.1–4.5)
LYMPHOCYTES RELATIVE PERCENT: 7.5 % (ref 20–40)
MAGNESIUM: 2.6 MG/DL (ref 1.6–2.4)
MCH RBC QN AUTO: 28 PG (ref 27–31)
MCHC RBC AUTO-ENTMCNC: 31.4 G/DL (ref 33–37)
MCV RBC AUTO: 89.2 FL (ref 80–94)
METHEMOGLOBIN ARTERIAL: 1.3 %
MONOCYTES ABSOLUTE: 0.2 K/UL (ref 0–0.9)
MONOCYTES RELATIVE PERCENT: 1.9 % (ref 0–10)
NEUTROPHILS ABSOLUTE: 11.2 K/UL (ref 1.5–7.5)
NEUTROPHILS RELATIVE PERCENT: 90 % (ref 50–65)
O2 CONTENT ARTERIAL: 16.2 ML/DL
O2 SAT, ARTERIAL: 95.2 %
O2 THERAPY: ABNORMAL
PCO2 ARTERIAL: 35 MMHG (ref 35–45)
PDW BLD-RTO: 16.7 % (ref 11.5–14.5)
PERFORMED ON: ABNORMAL
PERFORMED ON: ABNORMAL
PERFORMED ON: NORMAL
PERFORMED ON: NORMAL
PH ARTERIAL: 7.5 (ref 7.35–7.45)
PLATELET # BLD: 165 K/UL (ref 130–400)
PMV BLD AUTO: 10 FL (ref 9.4–12.4)
PO2 ARTERIAL: 74 MMHG (ref 80–100)
POTASSIUM SERPL-SCNC: 4 MMOL/L (ref 3.5–5)
POTASSIUM, WHOLE BLOOD: 3.9
PROTHROMBIN TIME: 13.4 SEC (ref 12–14.6)
RBC # BLD: 4.07 M/UL (ref 4.7–6.1)
SODIUM BLD-SCNC: 140 MMOL/L (ref 136–145)
TOTAL PROTEIN: 5.5 G/DL (ref 6.6–8.7)
WBC # BLD: 12.5 K/UL (ref 4.8–10.8)

## 2020-03-13 PROCEDURE — 94640 AIRWAY INHALATION TREATMENT: CPT

## 2020-03-13 PROCEDURE — 6360000002 HC RX W HCPCS: Performed by: INTERNAL MEDICINE

## 2020-03-13 PROCEDURE — 87186 SC STD MICRODIL/AGAR DIL: CPT

## 2020-03-13 PROCEDURE — 36600 WITHDRAWAL OF ARTERIAL BLOOD: CPT

## 2020-03-13 PROCEDURE — 80053 COMPREHEN METABOLIC PANEL: CPT

## 2020-03-13 PROCEDURE — 2700000000 HC OXYGEN THERAPY PER DAY

## 2020-03-13 PROCEDURE — 6360000004 HC RX CONTRAST MEDICATION: Performed by: INTERNAL MEDICINE

## 2020-03-13 PROCEDURE — 2100000000 HC CCU R&B

## 2020-03-13 PROCEDURE — C9113 INJ PANTOPRAZOLE SODIUM, VIA: HCPCS | Performed by: INTERNAL MEDICINE

## 2020-03-13 PROCEDURE — 93010 ELECTROCARDIOGRAM REPORT: CPT | Performed by: INTERNAL MEDICINE

## 2020-03-13 PROCEDURE — 93005 ELECTROCARDIOGRAM TRACING: CPT | Performed by: INTERNAL MEDICINE

## 2020-03-13 PROCEDURE — 85025 COMPLETE CBC W/AUTO DIFF WBC: CPT

## 2020-03-13 PROCEDURE — 94003 VENT MGMT INPAT SUBQ DAY: CPT

## 2020-03-13 PROCEDURE — 6360000002 HC RX W HCPCS: Performed by: HOSPITALIST

## 2020-03-13 PROCEDURE — 99221 1ST HOSP IP/OBS SF/LOW 40: CPT | Performed by: NURSE PRACTITIONER

## 2020-03-13 PROCEDURE — 85610 PROTHROMBIN TIME: CPT

## 2020-03-13 PROCEDURE — 2580000003 HC RX 258: Performed by: INTERNAL MEDICINE

## 2020-03-13 PROCEDURE — 71275 CT ANGIOGRAPHY CHEST: CPT

## 2020-03-13 PROCEDURE — 71045 X-RAY EXAM CHEST 1 VIEW: CPT

## 2020-03-13 PROCEDURE — 84132 ASSAY OF SERUM POTASSIUM: CPT

## 2020-03-13 PROCEDURE — 82803 BLOOD GASES ANY COMBINATION: CPT

## 2020-03-13 PROCEDURE — 6370000000 HC RX 637 (ALT 250 FOR IP): Performed by: INTERNAL MEDICINE

## 2020-03-13 PROCEDURE — 83735 ASSAY OF MAGNESIUM: CPT

## 2020-03-13 PROCEDURE — 87205 SMEAR GRAM STAIN: CPT

## 2020-03-13 PROCEDURE — 87070 CULTURE OTHR SPECIMN AEROBIC: CPT

## 2020-03-13 PROCEDURE — 99233 SBSQ HOSP IP/OBS HIGH 50: CPT | Performed by: INTERNAL MEDICINE

## 2020-03-13 PROCEDURE — 87077 CULTURE AEROBIC IDENTIFY: CPT

## 2020-03-13 PROCEDURE — 85730 THROMBOPLASTIN TIME PARTIAL: CPT

## 2020-03-13 PROCEDURE — 99232 SBSQ HOSP IP/OBS MODERATE 35: CPT | Performed by: INTERNAL MEDICINE

## 2020-03-13 PROCEDURE — 36415 COLL VENOUS BLD VENIPUNCTURE: CPT

## 2020-03-13 PROCEDURE — 82947 ASSAY GLUCOSE BLOOD QUANT: CPT

## 2020-03-13 RX ADMIN — PROPOFOL 29 MCG/KG/MIN: 10 INJECTION, EMULSION INTRAVENOUS at 11:29

## 2020-03-13 RX ADMIN — METOPROLOL TARTRATE 12.5 MG: 25 TABLET, FILM COATED ORAL at 10:30

## 2020-03-13 RX ADMIN — PIPERACILLIN SODIUM AND TAZOBACTAM SODIUM 3.38 G: 3; .375 INJECTION, POWDER, LYOPHILIZED, FOR SOLUTION INTRAVENOUS at 20:57

## 2020-03-13 RX ADMIN — PROPOFOL 28 MCG/KG/MIN: 10 INJECTION, EMULSION INTRAVENOUS at 16:42

## 2020-03-13 RX ADMIN — IOPAMIDOL 90 ML: 755 INJECTION, SOLUTION INTRAVENOUS at 16:40

## 2020-03-13 RX ADMIN — IPRATROPIUM BROMIDE AND ALBUTEROL SULFATE 1 AMPULE: .5; 3 SOLUTION RESPIRATORY (INHALATION) at 06:35

## 2020-03-13 RX ADMIN — METOPROLOL TARTRATE 12.5 MG: 25 TABLET, FILM COATED ORAL at 20:57

## 2020-03-13 RX ADMIN — MORPHINE SULFATE 2 MG: 4 INJECTION INTRAVENOUS at 09:43

## 2020-03-13 RX ADMIN — METHYLPREDNISOLONE SODIUM SUCCINATE 40 MG: 40 INJECTION, POWDER, FOR SOLUTION INTRAMUSCULAR; INTRAVENOUS at 18:05

## 2020-03-13 RX ADMIN — PIPERACILLIN SODIUM AND TAZOBACTAM SODIUM 3.38 G: 3; .375 INJECTION, POWDER, LYOPHILIZED, FOR SOLUTION INTRAVENOUS at 05:35

## 2020-03-13 RX ADMIN — IPRATROPIUM BROMIDE AND ALBUTEROL SULFATE 1 AMPULE: .5; 3 SOLUTION RESPIRATORY (INHALATION) at 14:37

## 2020-03-13 RX ADMIN — HEPARIN SODIUM 7 UNITS/KG/HR: 10000 INJECTION, SOLUTION INTRAVENOUS at 04:12

## 2020-03-13 RX ADMIN — PROPOFOL 35 MCG/KG/MIN: 10 INJECTION, EMULSION INTRAVENOUS at 05:25

## 2020-03-13 RX ADMIN — IPRATROPIUM BROMIDE AND ALBUTEROL SULFATE 1 AMPULE: .5; 3 SOLUTION RESPIRATORY (INHALATION) at 10:24

## 2020-03-13 RX ADMIN — ASPIRIN 81 MG 81 MG: 81 TABLET ORAL at 08:53

## 2020-03-13 RX ADMIN — PROPOFOL 40 MCG/KG/MIN: 10 INJECTION, EMULSION INTRAVENOUS at 21:05

## 2020-03-13 RX ADMIN — MORPHINE SULFATE 2 MG: 4 INJECTION INTRAVENOUS at 22:10

## 2020-03-13 RX ADMIN — PROPOFOL 35 MCG/KG/MIN: 10 INJECTION, EMULSION INTRAVENOUS at 01:24

## 2020-03-13 RX ADMIN — PANTOPRAZOLE SODIUM 40 MG: 40 INJECTION, POWDER, FOR SOLUTION INTRAVENOUS at 08:53

## 2020-03-13 RX ADMIN — IPRATROPIUM BROMIDE AND ALBUTEROL SULFATE 1 AMPULE: .5; 3 SOLUTION RESPIRATORY (INHALATION) at 18:09

## 2020-03-13 RX ADMIN — PIPERACILLIN SODIUM AND TAZOBACTAM SODIUM 3.38 G: 3; .375 INJECTION, POWDER, LYOPHILIZED, FOR SOLUTION INTRAVENOUS at 14:41

## 2020-03-13 RX ADMIN — ATORVASTATIN CALCIUM 40 MG: 40 TABLET, FILM COATED ORAL at 20:57

## 2020-03-13 ASSESSMENT — PAIN SCALES - GENERAL
PAINLEVEL_OUTOF10: 0
PAINLEVEL_OUTOF10: 3
PAINLEVEL_OUTOF10: 0
PAINLEVEL_OUTOF10: 1
PAINLEVEL_OUTOF10: 5
PAINLEVEL_OUTOF10: 0
PAINLEVEL_OUTOF10: 0

## 2020-03-13 ASSESSMENT — PULMONARY FUNCTION TESTS
PIF_VALUE: 43
PIF_VALUE: 36
PIF_VALUE: 45
PIF_VALUE: 41
PIF_VALUE: 36
PIF_VALUE: 38
PIF_VALUE: 36
PIF_VALUE: 45
PIF_VALUE: 42
PIF_VALUE: 50
PIF_VALUE: 29
PIF_VALUE: 39
PIF_VALUE: 41
PIF_VALUE: 35
PIF_VALUE: 51
PIF_VALUE: 36
PIF_VALUE: 39
PIF_VALUE: 50
PIF_VALUE: 48
PIF_VALUE: 41
PIF_VALUE: 38
PIF_VALUE: 39
PIF_VALUE: 38
PIF_VALUE: 41
PIF_VALUE: 40
PIF_VALUE: 40
PIF_VALUE: 37
PIF_VALUE: 34
PIF_VALUE: 38
PIF_VALUE: 38
PIF_VALUE: 43
PIF_VALUE: 38
PIF_VALUE: 38
PIF_VALUE: 37
PIF_VALUE: 37
PIF_VALUE: 40
PIF_VALUE: 48
PIF_VALUE: 41
PIF_VALUE: 41
PIF_VALUE: 46
PIF_VALUE: 39
PIF_VALUE: 38
PIF_VALUE: 33

## 2020-03-13 NOTE — PROGRESS NOTES
Length of Stay:   LOS: 2 days      Chief complaint:  Acute hypoxia    Subjective:  Sedated on MV    Physical Examination:  BP (!) 123/56   Pulse (!) 45   Temp 96.9 °F (36.1 °C) (Temporal)   Resp 14   Ht 6' (1.829 m)   Wt 255 lb 11.7 oz (116 kg)   SpO2 93%   BMI 34.68 kg/m²   Constitutional - Appropriately groomed, good nutritional status  Psychiatric - Sedated  ENMT - lips, teeth, gums intact; hearing intact  Respiratory - Coarse BS bilaterally  Cardiovascular - Clear S1, S2 no gross m/r/g; pedal pulses intact  Abd - Soft, non-tender; No gross hernia  MSK - UE and LE joints intact, no gross clubbing  Skin - No rashes or wounds; no gross capillary refill defects  Neurologic - Sedated    Medications:    metoprolol tartrate, 12.5 mg, Oral, BID    pantoprazole, 40 mg, Intravenous, Daily    insulin lispro, 0-6 Units, Subcutaneous, TID WC    insulin lispro, 0-3 Units, Subcutaneous, Nightly    aspirin, 81 mg, Oral, Daily    atorvastatin, 40 mg, Oral, Nightly    ipratropium-albuterol, 1 ampule, Inhalation, Q4H WA    methylPREDNISolone, 40 mg, Intravenous, Daily    piperacillin-tazobactam, 3.375 g, Intravenous, Q8H      Problem list:  Active Problems:    CAD (coronary artery disease)    COPD exacerbation (HCC)    Hypertension    NSTEMI (non-ST elevated myocardial infarction) [facility])    Palliative care patient  Resolved Problems:    * No resolved hospital problems.  *        A/P:  []NSTEMI, COPD exacerbation, acute on chronic hypoxic respiratory failure  -Cardiology, pulmonary consulted  -Likely type 2 supply demand ischemia  -medical management per cardiology  -Continue MV till after cardiac cath  Continue steroids, nebs, abs    []Hospital Issues  DVT prop - SCDs  Code - FULL  Disposition - Continue CCU       Willian Veronica M.D.,  3/12/20

## 2020-03-13 NOTE — PROGRESS NOTES
BMI 34.68 kg/m²       Intake/Output Summary (Last 24 hours) at 3/13/2020 1134  Last data filed at 3/13/2020 1000  Gross per 24 hour   Intake 776.76 ml   Output 900 ml   Net -123.24 ml       Prior to Admission medications    Medication Sig Start Date End Date Taking?  Authorizing Provider   isosorbide mononitrate (IMDUR) 30 MG extended release tablet TAKE ONE TABLET BY MOUTH ONCE A DAY *DO NOT TAKE IF SYSTOLIC BLOOD PRESSURE IS LESS THAN 100* 10/3/19   CHALO Camacho   metoprolol succinate (TOPROL XL) 25 MG extended release tablet TAKE 1 TABLET BY MOUTH DAILY 10/3/19   Santina Councilman, APRN - NP   lisinopril (PRINIVIL;ZESTRIL) 5 MG tablet TAKE 1 TABLET BY MOUTH DAILY 10/3/19   Santina Councilman, APRN - NP   atorvastatin (LIPITOR) 80 MG tablet TAKE 1 TABLET BY MOUTH ONCE DAILY 7/1/19   Padmaja aMrtins MD   tamsulosin (FLOMAX) 0.4 MG capsule Take 0.4 mg by mouth daily    Historical Provider, MD   guaiFENesin (MUCINEX) 600 MG extended release tablet Take 600 mg by mouth daily    Historical Provider, MD   prasugrel (EFFIENT) 10 MG TABS TAKE 1 TABLET BY MOUTH DAILY 5/29/19   CHALO Camacho   furosemide (LASIX) 20 MG tablet Take 1 tablet by mouth daily as needed (weight gain of 3 lbs overnight or 5 pounds in a week.) 2/11/19   CHALO Camacho   nitroGLYCERIN (NITROSTAT) 0.4 MG SL tablet Place 1 tablet under the tongue every 5 minutes as needed for Chest pain 8/28/18   CHALO Giron   aspirin 81 MG chewable tablet Take 1 tablet by mouth daily 6/8/18   Kristin Blackwell MD   ipratropium-albuterol (DUONEB) 0.5-2.5 (3) MG/3ML SOLN nebulizer solution Inhale 3 mLs into the lungs every 4 hours (while awake) 6/7/18   Kristin Blackwell MD        metoprolol tartrate  12.5 mg Oral BID    pantoprazole  40 mg Intravenous Daily    insulin lispro  0-6 Units Subcutaneous TID WC    insulin lispro  0-3 Units Subcutaneous Nightly    aspirin  81 mg Oral Daily    atorvastatin  40 mg Oral Nightly    ipratropium-albuterol  1 ampule Inhalation Q4H WA    methylPREDNISolone  40 mg Intravenous Daily    piperacillin-tazobactam  3.375 g Intravenous Q8H       TELEMETRY: Sinus     Physical Exam:      Physical Exam      General:  Awake, alert, NAD  Skin:  Warm and dry  Neck:  no jvd , no carotid bruits  Chest:  Clear to auscultation, no wheezing or rales  Cardiovascular:  RRR I5I4 no murmurs, clicks, gallups, or rubs  Abdomen:  Soft nontender, nondistended, bowel sounds present  Extremities:  Edema: none     Lab Data:  CBC:   Recent Labs     03/11/20 1757 03/12/20 0115 03/13/20 0129   WBC 10.4 13.3* 12.5*   HGB 12.5* 12.6* 11.4*   HCT 39.1* 40.8* 36.3*   MCV 88.9 91.5 89.2    162 165     BMP:   Recent Labs     03/11/20 1757 03/12/20 0115 03/12/20 0429 03/13/20 0129 03/13/20  0422     --  140  --  140  --    K 4.2   < > 4.0 3.8 4.0 3.9     --  103  --  104  --    CO2 25  --  20*  --  25  --    BUN 22  --  26*  --  29*  --    CREATININE 1.0  --  1.2  --  0.9  --     < > = values in this interval not displayed. LIVER PROFILE:   Recent Labs     03/11/20 1757 03/12/20 0115 03/13/20 0129   AST 25 23 16   ALT 20 18 15   BILITOT 0.4 0.3 0.3   ALKPHOS 66 60 53     PT/INR:   Recent Labs     03/11/20 1757 03/12/20 0115 03/13/20 0129   PROTIME 12.9 14.3 13.4   INR 0.98 1.11 1.03     APTT:   Recent Labs     03/12/20 2010 03/13/20 0129 03/13/20  0701   APTT 77.7* 61.4* 60.1*     BNP:  No results for input(s): BNP in the last 72 hours. CK, CKMB, Troponin: @LABRCNT (CKTOTAL:3, CKMB:3, TROPONINI:3)@    IMAGING:  Xr Chest Portable    Result Date: 3/13/2020  Examination. XR CHEST PORTABLE 3/13/2020 5:00 AM History: Intubation. Frontal portable semiupright view of the chest is obtained and compared with the previous study dated 3/12/2020. The lungs are moderately well-expanded. There are scarring and atelectatic changes at bilateral bases. The heart size in the normal range.  Atheromatous changes thoracic aorta are noted. An endotracheal tube is seen in a stable and satisfactory position. The distal end of the endotracheal tube is seen in the body of the stomach. No bony abnormality. No active cardiopulmonary disease. Endotracheal tube and nasogastric tubes in an optimal in stable position. Signed by Dr Colletta Jointer on 3/13/2020 6:47 AM    Xr Chest Portable    Result Date: 3/12/2020  EXAMINATION: XR CHEST PORTABLE 3/12/2020 7:19 AM HISTORY: XR CHEST PORTABLE 3/12/2020 2:00 AM HISTORY: Intubated COMPARISON: March 11, 2020. FINDINGS: Hyperinflation is present. No infiltrates or consolidation is identified. Cardiac silhouettes normal. Endotracheal tube is present. . The osseous structures and surrounding soft tissues demonstrate no acute abnormality. 1. COPD no acute cardiopulmonary process. Signed by Dr Rasheed Holm on 3/12/2020 7:20 AM    Xr Chest Portable    Result Date: 3/11/2020  XR CHEST PORTABLE 3/11/2020 4:45 PM HISTORY:   Respiratory distress  Single view. COMPARISONS:  4/17/2019 chest radiography FINDINGS: Endotracheal tube position above the marcella. NG tube looped in the proximal stomach. The lungs are clear without parenchymal infiltrates. The heart is normal in size, pulmonary circulation appropriate, without heart failure. The bony structures are intact. 1. Line and support tubes well-positioned. 2. No acute cardiopulmonary process. Signed by Dr Bhavik Ortega on 3/11/2020 7:07 PM        Assessment:  1. Chest pain on admission with elevated troponin 0.18 suspicious for non-STEMI  2. Known severe multivessel coronary disease  3. COPD  4. Acute respiratory failure now intubated  5. Hypertension  6. DNR status  7. Prior tobacco abuse discontinued 2 years ago  8. Cardiac catheterization 2000 with stent placement  9. History of anterior STEMI 6/5/2018  10.  Cardiac catheterization 6/5/2018 with 99% mid LAD 2.5 x 18 and 2.5 x 28 BMS diffuse coronary disease anterolateral dyskinesis EF

## 2020-03-13 NOTE — PROGRESS NOTES
included cigarettes. He has a 50.00 pack-year smoking history. His smokeless tobacco use includes chew. He reports previous alcohol use. He reports that he does not use drugs. Family History  family history includes Cancer in his brother and sister. Review of Systems:  Cannot obtain due to mechanical ventilation  Physical Exam:   height is 6' (1.829 m) and weight is 255 lb 11.7 oz (116 kg). His temporal temperature is 96.6 °F (35.9 °C). His blood pressure is 125/69 and his pulse is 41 (abnormal). His respiration is 18 and oxygen saturation is 96%. Intake/Output Summary (Last 24 hours) at 3/13/2020 0941  Last data filed at 3/13/2020 0800  Gross per 24 hour   Intake 895.3 ml   Output 855 ml   Net 40.3 ml     Ventilator Settings:     Vent Mode: AC/VC/Vt Ordered: 600 mL/Rate Set: 18 bmp/ /PEEP/CPAP: 5/FiO2 : 40 %/   Peak Inspiratory Pressure: 41 cmH2O  Mean Airway Pressure: 13 cmH20  I:E Ratio: 1:4.10  Rate Measured: 18 br/min  Minute Volume: 11 Liters           Physical Exam  Constitutional:       General: He is not in acute distress. Appearance: He is ill-appearing. He is not toxic-appearing or diaphoretic. Interventions: He is sedated and intubated. HENT:      Head: Normocephalic and atraumatic. Nose: Nose normal.   Eyes:      General: No scleral icterus. Cardiovascular:      Rate and Rhythm: Normal rate. Heart sounds: No murmur. No friction rub. No gallop. Pulmonary:      Effort: Tachypnea present. No accessory muscle usage or respiratory distress. He is intubated. Breath sounds: No decreased air movement. Decreased breath sounds present. No wheezing. Abdominal:      Palpations: Abdomen is soft. Skin:     General: Skin is warm and dry. Neurological:      Mental Status: He is lethargic.       Comments: sedated       Recent laboratory:  Recent Labs     03/11/20  1757 03/12/20  0115 03/13/20  0129   WBC 10.4 13.3* 12.5*   RBC 4.40* 4.46* 4.07*   HGB 12.5* 12.6* 11.4*   HCT 39.1* 4:45 PM HISTORY:   Respiratory distress  Single view. COMPARISONS:  4/17/2019 chest radiography FINDINGS: Endotracheal tube position above the marcella. NG tube looped in the proximal stomach. The lungs are clear without parenchymal infiltrates. The heart is normal in size, pulmonary circulation appropriate, without heart failure. The bony structures are intact. 1. Line and support tubes well-positioned. 2. No acute cardiopulmonary process. Signed by Dr Bethany Schultz on 3/11/2020 7:07 PM    My radiograph interpretation/independent review of imaging: ET tube in place. Hyperinflation noted. No infiltrates or consolidation. Other test results (not lab or imaging): Echo ordered and pending. PFT 7/2018 showed severe airflow obstruction with significant percentage increase Post BD with insignificant increase in FEV1 to 25% predicted leaving residual, very severe airflow obstruction. Mid flows severely reduced. Max vent reduced. Lung volumes with coexisting restrictive process, some gas trapping. Diffusion capacity severely reduced. Independent review of ekg: NSR 78 BPM, RBBB   Problem list as generated by [employer]:  Patient Active Problem List   Diagnosis    CAD (coronary artery disease)    Acute ST elevation myocardial infarction (STEMI) involving left anterior descending (LAD) coronary artery (HCC)    Electronic cigarette use    STEMI (ST elevation myocardial infarction) ([address]    Chest pain    Syncope and collapse    COPD exacerbation ([address]    Hypertension    NSTEMI (non-ST elevated myocardial infarction) ([address]    Palliative care patient     Pulmonary Assessment:        1. Acute on chronic hypoxic respiratory failure still requiring vent  2. Nstemi, per others leading to #1  3. Severe, stage III COPD with coexisting restrictive disease and severely reduced diffusion capacity. 4. Coronary artery disease   5.  Congestive heart failure        Recommend/plan:   Mechanical Ventilation- respiratory alkalosis,

## 2020-03-13 NOTE — PROGRESS NOTES
[facility] CHECK LIST    Permit signed and in chart:  Yes, [facility] consent signed, awaiting wife's arrival to have CABG consent signed. Bath prior to procedure? yes    Prep groins bilaterally by shaving at least 3 inches around the femoral pulse area. (use clippers not razor) yes    Has patient been NPO since midnight or as ordered? yes    IVF as ordered. no, not ordered. If pt is on lovenox, hold am of procedure and document time of last dose. Last dose given: N/A; on heparin gtt. List last pain medication given and if pain was relieved. Last dose given: N/A. Accurate height and weight on chart? yes     Allergy list accurate? yes     CBC, CMP and Troponins completed for baseline? yes     Pregnancy test for women unless no menstrual cycle for one year. If not ordered clarify with doctor. no, N/A. Does patient have patent IV? yes    Patient in gown only? Dentures, contacts,  hearing aides, jewelry removed? yes    Ensure that pt has opportunity to void prior to being taken down.         Primary Nurse eSignature: Pete Escobar RN on 3/13/2020 at 5:40 AM      Co-Signer eSignature: Electronically signed by Benjamin Osorio RN on 3/13/20 at 5:43 AM CDT

## 2020-03-13 NOTE — CONSULTS
ago. His smoking use included cigarettes. He has a 50.00 pack-year smoking history. His smokeless tobacco use includes chew. ETOH:   reports previous alcohol use.   MARITAL STATUS:    Patient currently lives with family     Family History:       Problem Relation Age of Onset    Cancer Sister     Cancer Brother        Palliative Performance Score: 10%    Review of Systems   Unable to complete, sedated and intubated    Palliative Review of Advance Directives:     Surrogate Decision Maker:yes    Durable Power of :no    Advanced Directives/Living Conrad: no    Out of hospital medical orders in place to reflect resuscitation status (MOLST/POLST): no    Information Sharing:  Patient's awareness of illness:  [] Terminal [] Life-Threatening [] Serious [] Non life-threatening [] Not serious   [x] Not discussed    Family awareness of illness:   [] Terminal [x] Life-Threatening [] Serious [] Nonlife-threatening [] Not serious   [] Not discussed    No diet orders on file    Medications:   dextrose      heparin (porcine) 7 Units/kg/hr (03/13/20 0724)    propofol 29 mcg/kg/min (03/13/20 1129)     Current Facility-Administered Medications   Medication Dose Route Frequency Provider Last Rate Last Dose    metoprolol tartrate (LOPRESSOR) tablet 12.5 mg  12.5 mg Oral BID Ronen Bolaños MD   12.5 mg at 03/13/20 1030    morphine injection 2 mg  2 mg Intravenous Q1H PRN Juana Davis MD   2 mg at 03/13/20 0943    pantoprazole (PROTONIX) injection 40 mg  40 mg Intravenous Daily Jass Nova MD   40 mg at 03/13/20 0853    insulin lispro (HUMALOG) injection vial 0-6 Units  0-6 Units Subcutaneous TID WC Jass Nova MD        insulin lispro (HUMALOG) injection vial 0-3 Units  0-3 Units Subcutaneous Nightly Jass Nova MD   1 Units at 03/11/20 2211    aspirin chewable tablet 81 mg  81 mg Oral Daily Jass Nova MD   81 mg at 03/13/20 0853    atorvastatin (LIPITOR) tablet 40 mg  40 mg Oral Nightly Eric Menendez MD   40 mg at 03/12/20 2046    glucose (GLUTOSE) 40 % oral gel 15 g  15 g Oral PRN Eric Menendez MD        dextrose 50 % IV solution  12.5 g Intravenous PRN Eric Menendez MD        glucagon (rDNA) injection 1 mg  1 mg Intramuscular PRN Eric Menendez MD        dextrose 5 % solution  100 mL/hr Intravenous PRN Eric eMnendez MD        ipratropium-albuterol (DUONEB) nebulizer solution 1 ampule  1 ampule Inhalation Q4H WA Eric Menendez MD   1 ampule at 03/13/20 1024    methylPREDNISolone sodium (SOLU-MEDROL) injection 40 mg  40 mg Intravenous Daily Eric Menendez MD   40 mg at 03/12/20 1849    piperacillin-tazobactam (ZOSYN) 3.375 g in dextrose 5 % 50 mL IVPB extended infusion (mini-bag)  3.375 g Intravenous Ross Perales MD   Stopped at 03/13/20 0935    heparin (porcine) injection 4,000 Units  4,000 Units Intravenous PRINDER Menendez MD        heparin (porcine) injection 2,000 Units  2,000 Units Intravenous PRINDER Menendez MD        heparin 25,000 units in dextrose 5% 250 mL infusion  10 Units/kg/hr Intravenous Continuous Eric Menendez MD 7.5 mL/hr at 03/13/20 0724 7 Units/kg/hr at 03/13/20 0724    propofol injection  10 mcg/kg/min Intravenous Titrated Edd Pan MD 18.6 mL/hr at 03/13/20 1129 29 mcg/kg/min at 03/13/20 1129        Labs:     Recent Labs     03/11/20 1757 03/12/20  0115 03/13/20  0129   WBC 10.4 13.3* 12.5*   RBC 4.40* 4.46* 4.07*   HGB 12.5* 12.6* 11.4*   HCT 39.1* 40.8* 36.3*   MCV 88.9 91.5 89.2   MCH 28.4 28.3 28.0   MCHC 32.0* 30.9* 31.4*    162 165     Recent Labs     03/11/20  1757 03/12/20  0115 03/12/20  0429 03/13/20  0129 03/13/20 0422     --  140  --  140  --    K 4.2   < > 4.0 3.8 4.0 3.9   ANIONGAP 9  --  17  --  11  --      --  103  --  104  --    CO2 25  --  20*  --  25  --    BUN 22  --  26*  --  29*  --    CREATININE 1.0  --  1.2  --  0.9  --    GLUCOSE 163*  --  143*  --  127*  --    CALCIUM 8.2*  --  8.5*  -- 8.1*  --     < > = values in this interval not displayed.      Recent Labs     03/11/20 1757 03/12/20 0115 03/13/20  0129   MG 2.4 2.7* 2.6*     Recent Labs     03/11/20 1757 03/12/20 0115 03/13/20  0129   AST 25 23 16   ALT 20 18 15   BILITOT 0.4 0.3 0.3   ALKPHOS 66 60 53     ABGs:  Recent Labs     03/11/20  1815 03/12/20  0429 03/13/20  0422   PHART 7.320* 7.430 7.500*   DYA0UOU 56.0* 41.0 35.0   PO2ART 65.0* 116.0* 74.0*   AYL6AHU 28.9* 27.2* 27.3*   BEART 1.7 2.6* 4.2*   HGBAE 12.7* 12.4* 12.1*   S3ZYSNIL 92.4 96.8 95.2   CARBOXHGBART 1.9 1.5 1.6   02THERAPY Unknown Unknown Unknown     HgBA1c:   Recent Labs     03/11/20 1757   LABA1C 6.0     FLP:    Lab Results   Component Value Date    TRIG 52 03/12/2020    HDL 64 03/12/2020    LDLCALC 85 03/12/2020     TSH:  No results found for: TSH  Troponin T:   Recent Labs     03/11/20 1757 03/12/20 0115 03/12/20  0916   TROPONINI 0.18* 0.15* 0.10*     INR:   Recent Labs     03/11/20 1757 03/12/20 0115 03/13/20  0129   INR 0.98 1.11 1.03       Objective:   Vitals: /67   Pulse (!) 44   Temp 96.6 °F (35.9 °C) (Temporal)   Resp 14   Ht 6' (1.829 m)   Wt 255 lb 11.7 oz (116 kg)   SpO2 100%   BMI 34.68 kg/m²   24HR INTAKE/OUTPUT:      Intake/Output Summary (Last 24 hours) at 3/13/2020 1358  Last data filed at 3/13/2020 1202  Gross per 24 hour   Intake 888.96 ml   Output 945 ml   Net -56.04 ml     Physical Exam      General appearance: sedated, intubated, no acute distress, appears ill  HEENT: atraumatic, eyes with clear conjunctiva and normal lids, pupils and irises normal, external ears and nose are normal,lips normal, ETT in place  Neck: without masses, lymphadenopathy, supple  Lungs: no increased work of breathing, ventilating bilaterally, diminished   Heart: regular rate and rhythm and S1, S2 normal, bradycardia  Abdomen: soft, non-tender; bowel sounds normal; no masses,  no organomegaly  Genitourinary: No bladder fullness, masses, or tenderness  Extremities: extremities normal, atraumatic, no cyanosis or edema  Neurologic:sedated, opens eyes at times  Psychiatric: sedated  Skin: warm, dry    Assessment and Plan: Active Problems:    CAD (coronary artery disease)    COPD exacerbation (HCC)    Hypertension    NSTEMI (non-ST elevated myocardial infarction) [facility])    Palliative care patient  Resolved Problems:    * No resolved hospital problems. *      Visit Summary: I saw the patient at the bedside with nursing staff present. Patient continues to have bradycardia and sedation is being titrated. Current plan is to re-evaluate on Monday for possible heart cath versus a move toward hospice care. Nursing staff reports that the spouse is very open to hospice care and believes that the spouse would prefer moving toward hospice but will wait for Dr. Maurizio Chan to see the patient. I will follow up on Monday for goals of care after Dr. Maurizio Chan evaluates. Palliative medicine will continue to follow. Recommendations: Awaiting evaluation Monday for possible heart cath and ability to wean from vent vs. Move toward hospice care    Thank you for consulting palliative care and allowing us to participate in the care of the patient.       CounselingTopics: Goals of care, Code Status                                        Total Face to Face Time: 20 min  Time Spent Reviewing Records/Provider Communication: 15 min  Total Time Spent: 35 min    Electronically signed by CHALO Rodriguez on 3/13/2020 at 1:58 PM    (Please note that portions of this note were completed with a voice recognition program.  Efforts were made to edit the dictations but occasionally words are mis-transcribed.)

## 2020-03-14 ENCOUNTER — OUTSIDE FACILITY SERVICE (OUTPATIENT)
Dept: PULMONOLOGY | Facility: CLINIC | Age: 70
End: 2020-03-14

## 2020-03-14 ENCOUNTER — APPOINTMENT (OUTPATIENT)
Dept: GENERAL RADIOLOGY | Age: 70
DRG: 207 | End: 2020-03-14
Attending: INTERNAL MEDICINE
Payer: MEDICARE

## 2020-03-14 LAB
ALBUMIN SERPL-MCNC: 2.8 G/DL (ref 3.5–5.2)
ALP BLD-CCNC: 51 U/L (ref 40–130)
ALT SERPL-CCNC: 13 U/L (ref 5–41)
ANION GAP SERPL CALCULATED.3IONS-SCNC: 11 MMOL/L (ref 7–19)
ANISOCYTOSIS: ABNORMAL
APTT: 48.9 SEC (ref 26–36.2)
APTT: 54 SEC (ref 26–36.2)
APTT: 69.5 SEC (ref 26–36.2)
APTT: 99.5 SEC (ref 26–36.2)
AST SERPL-CCNC: 13 U/L (ref 5–40)
BANDED NEUTROPHILS RELATIVE PERCENT: 2 % (ref 0–5)
BASE EXCESS ARTERIAL: 3.7 MMOL/L (ref -2–2)
BASOPHILS ABSOLUTE: 0 K/UL (ref 0–0.2)
BASOPHILS RELATIVE PERCENT: 0 % (ref 0–1)
BILIRUB SERPL-MCNC: 0.4 MG/DL (ref 0.2–1.2)
BUN BLDV-MCNC: 30 MG/DL (ref 8–23)
CALCIUM SERPL-MCNC: 8 MG/DL (ref 8.8–10.2)
CARBOXYHEMOGLOBIN ARTERIAL: 1.7 % (ref 0–5)
CHLORIDE BLD-SCNC: 105 MMOL/L (ref 98–111)
CO2: 25 MMOL/L (ref 22–29)
CREAT SERPL-MCNC: 0.9 MG/DL (ref 0.5–1.2)
EOSINOPHILS ABSOLUTE: 0 K/UL (ref 0–0.6)
EOSINOPHILS RELATIVE PERCENT: 0 % (ref 0–5)
GFR NON-AFRICAN AMERICAN: >60
GLUCOSE BLD-MCNC: 101 MG/DL (ref 70–99)
GLUCOSE BLD-MCNC: 122 MG/DL (ref 70–99)
GLUCOSE BLD-MCNC: 122 MG/DL (ref 74–109)
GLUCOSE BLD-MCNC: 78 MG/DL (ref 70–99)
GLUCOSE BLD-MCNC: 89 MG/DL (ref 70–99)
GLUCOSE BLD-MCNC: 99 MG/DL (ref 70–99)
HCO3 ARTERIAL: 27.7 MMOL/L (ref 22–26)
HCT VFR BLD CALC: 38.4 % (ref 42–52)
HEMOGLOBIN, ART, EXTENDED: 12 G/DL (ref 14–18)
HEMOGLOBIN: 12.1 G/DL (ref 14–18)
HYPOCHROMIA: ABNORMAL
IMMATURE GRANULOCYTES #: 0 K/UL
INR BLD: 1 (ref 0.88–1.18)
LYMPHOCYTES ABSOLUTE: 0.6 K/UL (ref 1.1–4.5)
LYMPHOCYTES RELATIVE PERCENT: 8 % (ref 20–40)
MAGNESIUM: 2.8 MG/DL (ref 1.6–2.4)
MCH RBC QN AUTO: 28.2 PG (ref 27–31)
MCHC RBC AUTO-ENTMCNC: 31.5 G/DL (ref 33–37)
MCV RBC AUTO: 89.5 FL (ref 80–94)
METHEMOGLOBIN ARTERIAL: 1.1 %
MONOCYTES ABSOLUTE: 0.2 K/UL (ref 0–0.9)
MONOCYTES RELATIVE PERCENT: 2 % (ref 0–10)
NEUTROPHILS ABSOLUTE: 7.1 K/UL (ref 1.5–7.5)
NEUTROPHILS RELATIVE PERCENT: 88 % (ref 50–65)
O2 CONTENT ARTERIAL: 16.1 ML/DL
O2 SAT, ARTERIAL: 95 %
O2 THERAPY: ABNORMAL
OVALOCYTES: ABNORMAL
PCO2 ARTERIAL: 39 MMHG (ref 35–45)
PDW BLD-RTO: 16.8 % (ref 11.5–14.5)
PERFORMED ON: ABNORMAL
PERFORMED ON: ABNORMAL
PERFORMED ON: NORMAL
PH ARTERIAL: 7.46 (ref 7.35–7.45)
PLATELET # BLD: 159 K/UL (ref 130–400)
PLATELET SLIDE REVIEW: ADEQUATE
PMV BLD AUTO: 9.9 FL (ref 9.4–12.4)
PO2 ARTERIAL: 75 MMHG (ref 80–100)
POLYCHROMASIA: ABNORMAL
POTASSIUM SERPL-SCNC: 4 MMOL/L (ref 3.5–5)
POTASSIUM, WHOLE BLOOD: 4
PROTHROMBIN TIME: 13.1 SEC (ref 12–14.6)
RBC # BLD: 4.29 M/UL (ref 4.7–6.1)
SODIUM BLD-SCNC: 141 MMOL/L (ref 136–145)
TOTAL PROTEIN: 5.6 G/DL (ref 6.6–8.7)
URINE CULTURE, ROUTINE: NORMAL
WBC # BLD: 7.9 K/UL (ref 4.8–10.8)

## 2020-03-14 PROCEDURE — 36415 COLL VENOUS BLD VENIPUNCTURE: CPT

## 2020-03-14 PROCEDURE — 6370000000 HC RX 637 (ALT 250 FOR IP): Performed by: INTERNAL MEDICINE

## 2020-03-14 PROCEDURE — 6360000002 HC RX W HCPCS: Performed by: INTERNAL MEDICINE

## 2020-03-14 PROCEDURE — 84132 ASSAY OF SERUM POTASSIUM: CPT

## 2020-03-14 PROCEDURE — 2580000003 HC RX 258: Performed by: INTERNAL MEDICINE

## 2020-03-14 PROCEDURE — 82947 ASSAY GLUCOSE BLOOD QUANT: CPT

## 2020-03-14 PROCEDURE — 93005 ELECTROCARDIOGRAM TRACING: CPT | Performed by: INTERNAL MEDICINE

## 2020-03-14 PROCEDURE — 6360000002 HC RX W HCPCS: Performed by: HOSPITALIST

## 2020-03-14 PROCEDURE — 94003 VENT MGMT INPAT SUBQ DAY: CPT

## 2020-03-14 PROCEDURE — 85730 THROMBOPLASTIN TIME PARTIAL: CPT

## 2020-03-14 PROCEDURE — 80053 COMPREHEN METABOLIC PANEL: CPT

## 2020-03-14 PROCEDURE — 85610 PROTHROMBIN TIME: CPT

## 2020-03-14 PROCEDURE — 94640 AIRWAY INHALATION TREATMENT: CPT

## 2020-03-14 PROCEDURE — 2100000000 HC CCU R&B

## 2020-03-14 PROCEDURE — 83735 ASSAY OF MAGNESIUM: CPT

## 2020-03-14 PROCEDURE — 82803 BLOOD GASES ANY COMBINATION: CPT

## 2020-03-14 PROCEDURE — 85025 COMPLETE CBC W/AUTO DIFF WBC: CPT

## 2020-03-14 PROCEDURE — 36600 WITHDRAWAL OF ARTERIAL BLOOD: CPT

## 2020-03-14 PROCEDURE — 2700000000 HC OXYGEN THERAPY PER DAY

## 2020-03-14 PROCEDURE — 71045 X-RAY EXAM CHEST 1 VIEW: CPT

## 2020-03-14 PROCEDURE — 99233 SBSQ HOSP IP/OBS HIGH 50: CPT | Performed by: INTERNAL MEDICINE

## 2020-03-14 PROCEDURE — C9113 INJ PANTOPRAZOLE SODIUM, VIA: HCPCS | Performed by: INTERNAL MEDICINE

## 2020-03-14 RX ORDER — METHYLPREDNISOLONE SODIUM SUCCINATE 40 MG/ML
20 INJECTION, POWDER, LYOPHILIZED, FOR SOLUTION INTRAMUSCULAR; INTRAVENOUS DAILY
Status: COMPLETED | OUTPATIENT
Start: 2020-03-14 | End: 2020-03-15

## 2020-03-14 RX ADMIN — HEPARIN SODIUM 8 UNITS/KG/HR: 10000 INJECTION, SOLUTION INTRAVENOUS at 09:57

## 2020-03-14 RX ADMIN — PIPERACILLIN SODIUM AND TAZOBACTAM SODIUM 3.38 G: 3; .375 INJECTION, POWDER, LYOPHILIZED, FOR SOLUTION INTRAVENOUS at 13:30

## 2020-03-14 RX ADMIN — METHYLPREDNISOLONE SODIUM SUCCINATE 20 MG: 40 INJECTION, POWDER, FOR SOLUTION INTRAMUSCULAR; INTRAVENOUS at 18:22

## 2020-03-14 RX ADMIN — IPRATROPIUM BROMIDE AND ALBUTEROL SULFATE 1 AMPULE: .5; 3 SOLUTION RESPIRATORY (INHALATION) at 18:14

## 2020-03-14 RX ADMIN — IPRATROPIUM BROMIDE AND ALBUTEROL SULFATE 1 AMPULE: .5; 3 SOLUTION RESPIRATORY (INHALATION) at 10:10

## 2020-03-14 RX ADMIN — PROPOFOL 38 MCG/KG/MIN: 10 INJECTION, EMULSION INTRAVENOUS at 08:21

## 2020-03-14 RX ADMIN — ASPIRIN 81 MG 81 MG: 81 TABLET ORAL at 09:56

## 2020-03-14 RX ADMIN — ATORVASTATIN CALCIUM 40 MG: 40 TABLET, FILM COATED ORAL at 20:26

## 2020-03-14 RX ADMIN — PROPOFOL 45 MCG/KG/MIN: 10 INJECTION, EMULSION INTRAVENOUS at 22:51

## 2020-03-14 RX ADMIN — PIPERACILLIN SODIUM AND TAZOBACTAM SODIUM 3.38 G: 3; .375 INJECTION, POWDER, LYOPHILIZED, FOR SOLUTION INTRAVENOUS at 20:25

## 2020-03-14 RX ADMIN — PROPOFOL 43 MCG/KG/MIN: 10 INJECTION, EMULSION INTRAVENOUS at 11:47

## 2020-03-14 RX ADMIN — PIPERACILLIN SODIUM AND TAZOBACTAM SODIUM 3.38 G: 3; .375 INJECTION, POWDER, LYOPHILIZED, FOR SOLUTION INTRAVENOUS at 05:03

## 2020-03-14 RX ADMIN — IPRATROPIUM BROMIDE AND ALBUTEROL SULFATE 1 AMPULE: .5; 3 SOLUTION RESPIRATORY (INHALATION) at 06:19

## 2020-03-14 RX ADMIN — METOPROLOL TARTRATE 12.5 MG: 25 TABLET, FILM COATED ORAL at 09:56

## 2020-03-14 RX ADMIN — PANTOPRAZOLE SODIUM 40 MG: 40 INJECTION, POWDER, FOR SOLUTION INTRAVENOUS at 09:56

## 2020-03-14 RX ADMIN — PROPOFOL 43 MCG/KG/MIN: 10 INJECTION, EMULSION INTRAVENOUS at 15:17

## 2020-03-14 RX ADMIN — MORPHINE SULFATE 2 MG: 4 INJECTION INTRAVENOUS at 20:26

## 2020-03-14 RX ADMIN — PROPOFOL 45 MCG/KG/MIN: 10 INJECTION, EMULSION INTRAVENOUS at 19:18

## 2020-03-14 RX ADMIN — IPRATROPIUM BROMIDE AND ALBUTEROL SULFATE 1 AMPULE: .5; 3 SOLUTION RESPIRATORY (INHALATION) at 14:42

## 2020-03-14 RX ADMIN — METOPROLOL TARTRATE 12.5 MG: 25 TABLET, FILM COATED ORAL at 20:26

## 2020-03-14 RX ADMIN — PROPOFOL 45 MCG/KG/MIN: 10 INJECTION, EMULSION INTRAVENOUS at 00:25

## 2020-03-14 RX ADMIN — PROPOFOL 43 MCG/KG/MIN: 10 INJECTION, EMULSION INTRAVENOUS at 04:16

## 2020-03-14 ASSESSMENT — PULMONARY FUNCTION TESTS
PIF_VALUE: 36
PIF_VALUE: 37
PIF_VALUE: 37
PIF_VALUE: 35
PIF_VALUE: 37
PIF_VALUE: 38
PIF_VALUE: 37
PIF_VALUE: 33
PIF_VALUE: 34
PIF_VALUE: 22
PIF_VALUE: 47
PIF_VALUE: 60
PIF_VALUE: 34
PIF_VALUE: 38
PIF_VALUE: 38
PIF_VALUE: 37
PIF_VALUE: 60
PIF_VALUE: 35
PIF_VALUE: 38
PIF_VALUE: 37
PIF_VALUE: 36
PIF_VALUE: 36
PIF_VALUE: 37
PIF_VALUE: 34
PIF_VALUE: 37
PIF_VALUE: 34
PIF_VALUE: 48
PIF_VALUE: 47
PIF_VALUE: 35
PIF_VALUE: 46
PIF_VALUE: 45
PIF_VALUE: 38
PIF_VALUE: 35
PIF_VALUE: 36
PIF_VALUE: 39
PIF_VALUE: 34
PIF_VALUE: 36
PIF_VALUE: 35
PIF_VALUE: 47
PIF_VALUE: 45
PIF_VALUE: 47
PIF_VALUE: 35
PIF_VALUE: 45
PIF_VALUE: 34
PIF_VALUE: 46
PIF_VALUE: 24
PIF_VALUE: 38
PIF_VALUE: 34

## 2020-03-14 ASSESSMENT — PAIN SCALES - GENERAL
PAINLEVEL_OUTOF10: 0
PAINLEVEL_OUTOF10: 5
PAINLEVEL_OUTOF10: 0

## 2020-03-14 NOTE — PROGRESS NOTES
(80.7 kg),   · BMI Classification: BMI 30.0 - 34.9 Obese Class I    Nutrition Interventions:   Continue NPO, Start Tube Feeding  Continued Inpatient Monitoring    Nutrition Evaluation:   · Evaluation: Goals set   · Goals: meet nutritional needs via EN   · Monitoring: TF Intake, Weight, Pertinent Labs      Electronically signed by Chantale Morton MS, RD, LD on 3/14/20 at 10:09 AM CDT    Contact Number: [phone]

## 2020-03-14 NOTE — PROGRESS NOTES
Length of Stay:   LOS: 3 days      Chief complaint:  Acute hypoxia    Subjective:  Sedated on MV    Physical Examination:  /64   Pulse 51   Temp 96.9 °F (36.1 °C) (Temporal)   Resp 14   Ht 6' (1.829 m)   Wt 222 lb 14.4 oz (101.1 kg)   SpO2 98%   BMI 30.23 kg/m²   Constitutional - Appropriately groomed, good nutritional status  Psychiatric - Sedated  ENMT - lips, teeth, gums intact; hearing intact  Respiratory - Coarse BS bilaterally  Cardiovascular - Clear S1, S2 no gross m/r/g; pedal pulses intact  Abd - Soft, non-tender; No gross hernia  MSK - UE and LE joints intact, no gross clubbing  Skin - No rashes or wounds; no gross capillary refill defects  Neurologic - Sedated    Medications:    metoprolol tartrate, 12.5 mg, Oral, BID    pantoprazole, 40 mg, Intravenous, Daily    insulin lispro, 0-6 Units, Subcutaneous, TID WC    insulin lispro, 0-3 Units, Subcutaneous, Nightly    aspirin, 81 mg, Oral, Daily    atorvastatin, 40 mg, Oral, Nightly    ipratropium-albuterol, 1 ampule, Inhalation, Q4H WA    methylPREDNISolone, 40 mg, Intravenous, Daily    piperacillin-tazobactam, 3.375 g, Intravenous, Q8H      Problem list:  Active Problems:    CAD (coronary artery disease)    COPD exacerbation (HCC)    Hypertension    NSTEMI (non-ST elevated myocardial infarction) [facility])    Palliative care patient  Resolved Problems:    * No resolved hospital problems.  *        A/P:  []NSTEMI, COPD exacerbation, acute on chronic hypoxic respiratory failure, PNA  -Cardiology, pulmonary consulted  -Likely type 2 supply demand ischemia  -medical management per cardiology  -Possible cath on Monday  Continue steroids, nebs, abs  -Viral respiratory panel positive for RSV    []Hospital Issues  DVT prop - SCDs  Code - FULL  Disposition - Continue CCU       Alexandria Nova M.D.,  3/13/20

## 2020-03-14 NOTE — PROGRESS NOTES
bony structures are intact. 1. Line and support tubes well-positioned. 2. No acute cardiopulmonary process. Signed by Dr Author Edmond on 3/11/2020 7:07 PM    My radiograph interpretation/independent review of imaging:copd changes, no infiltrates    Problem list as generated by [facility]:  Patient Active Problem List   Diagnosis    CAD (coronary artery disease)    Acute ST elevation myocardial infarction (STEMI) involving left anterior descending (LAD) coronary artery [facility]    Electronic cigarette use    STEMI (ST elevation myocardial infarction) ([facility]    Chest pain    Syncope and collapse    COPD exacerbation ([facility]    Hypertension    NSTEMI (non-ST elevated myocardial infarction) ([facility]    Palliative care patient     Pulmonary Assessment:    1. Acute on chronic hypoxic respiratory failure still requiring vent stable but unable to proceed with spontaneous breathing trial yet  2. Nstemi,  leading to above  3. Severe, stage III COPD with coexisting restrictive disease and severely reduced diffusion capacity. On BD  4. Coronary artery disease   5. Congestive heart failure   6. Mild metabolic alkalosis       Recommend/plan:   Continue vent at current settings. Continue daily abg and cxr for now   · Continue bronchodilators  · Taper steroids  · Protonix for GI prophylaxis       Long discussion with wife and daughters. We will continue to manage his vent. Will not plan for extubation until further assessment of his heart, appropriateness to wean, or if decision made for comfort care.    Electronically signed by Aung Presser on 3/14/2020 at 6:06 PM

## 2020-03-14 NOTE — PROGRESS NOTES
Vital high protein tube feeding started at this time time. Proteinex supplemental shot administered prior. Will monitor for pt toleration.   Electronically signed by Laure Nance RN on 3/14/2020 at 3:37 PM

## 2020-03-14 NOTE — PROGRESS NOTES
Clarification with microbiology, pt does not have RSV at this time, resp culture pending. Microbiology reported wrong patient results.   Electronically signed by Juan Goyal RN on 3/13/2020 at 7:57 PM

## 2020-03-14 NOTE — PLAN OF CARE
Problem: Restraint Use - Nonviolent/Non-Self-Destructive Behavior:  Goal: Absence of restraint-related injury  Description: Absence of restraint-related injury  Outcome: Met This Shift     Problem: Falls - Risk of:  Goal: Will remain free from falls  Description: Will remain free from falls  Outcome: Ongoing  Goal: Absence of physical injury  Description: Absence of physical injury  Outcome: Ongoing     Problem: Infection - Ventilator-Associated Pneumonia:  Goal: Prevent a ventilator associated event (VAE)  Description: Prevent a ventilator associated event (VAE)  Outcome: Ongoing  Goal: Absence of pulmonary infection  Description: Absence of pulmonary infection  Outcome: Ongoing     Problem: Urinary Elimination:  Goal: Signs and symptoms of infection will decrease  Description: Signs and symptoms of infection will decrease  Outcome: Ongoing  Goal: Complications related to the disease process, condition or treatment will be avoided or minimized  Description: Complications related to the disease process, condition or treatment will be avoided or minimized  Outcome: Ongoing     Problem: Breathing Pattern - Ineffective:  Goal: Ability to achieve and maintain a regular respiratory rate will improve  Description: Ability to achieve and maintain a regular respiratory rate will improve  Outcome: Ongoing     Problem: Nutrition  Goal: Optimal nutrition therapy  Description: Nutrition Problem: Inadequate oral intake  Intervention: Food and/or Nutrient Delivery: Continue NPO, Start Tube Feeding  Nutritional Goals: meet nutritional needs via EN     3/14/2020 1839 by Richar Avalos RN  Outcome: Ongoing  3/14/2020 1012 by Mary Hurst MS, RD, LD  Outcome: Ongoing     Problem: Restraint Use - Nonviolent/Non-Self-Destructive Behavior:  Goal: Absence of restraint indications  Description: Absence of restraint indications  Outcome: Not Met This Shift

## 2020-03-14 NOTE — PLAN OF CARE
Problem: Restraint Use - Nonviolent/Non-Self-Destructive Behavior:  Goal: Absence of restraint-related injury  Description: Absence of restraint-related injury  Outcome: Met This Shift     Problem: Falls - Risk of:  Goal: Will remain free from falls  Description: Will remain free from falls  Outcome: Ongoing  Goal: Absence of physical injury  Description: Absence of physical injury  Outcome: Ongoing     Problem: Infection - Ventilator-Associated Pneumonia:  Goal: Prevent a ventilator associated event (VAE)  Description: Prevent a ventilator associated event (VAE)  Outcome: Ongoing  Goal: Absence of pulmonary infection  Description: Absence of pulmonary infection  Outcome: Ongoing     Problem: Urinary Elimination:  Goal: Signs and symptoms of infection will decrease  Description: Signs and symptoms of infection will decrease  Outcome: Ongoing  Goal: Complications related to the disease process, condition or treatment will be avoided or minimized  Description: Complications related to the disease process, condition or treatment will be avoided or minimized  3/14/2020 1841 by Aisha Caldwell RN  Outcome: Ongoing  3/14/2020 1839 by Aisha Caldwell RN  Outcome: Ongoing     Problem: Breathing Pattern - Ineffective:  Goal: Ability to achieve and maintain a regular respiratory rate will improve  Description: Ability to achieve and maintain a regular respiratory rate will improve  Outcome: Ongoing     Problem: Nutrition  Goal: Optimal nutrition therapy  Description: Nutrition Problem: Inadequate oral intake  Intervention: Food and/or Nutrient Delivery: Continue NPO, Start Tube Feeding  Nutritional Goals: meet nutritional needs via EN     3/14/2020 1839 by Aisha Caldwell RN  Outcome: Ongoing  3/14/2020 1012 by Bairon Valdez MS, RD, LD  Outcome: Ongoing     Problem: Cardiac:  Goal: Hemodynamic stability will improve  Description: Hemodynamic stability will improve  Outcome: Ongoing     Problem: Coping:  Goal: Level of anxiety will decrease  Description: Level of anxiety will decrease  Outcome: Ongoing  Goal: Ability to cope will improve  Description: Ability to cope will improve  Outcome: Ongoing  Goal: Ability to establish a method of communication will improve  Description: Ability to establish a method of communication will improve  Outcome: Ongoing     Problem: Nutritional:  Goal: Consumption of the prescribed amount of daily calories will improve  Description: Consumption of the prescribed amount of daily calories will improve  Outcome: Ongoing     Problem: Respiratory:  Goal: Complications related to the disease process, condition or treatment will be avoided or minimized  Description: Complications related to the disease process, condition or treatment will be avoided or minimized  3/14/2020 1841 by Juan Pablo Trevizo RN  Outcome: Ongoing  3/14/2020 1839 by Juan Pablo Trevizo RN  Outcome: Ongoing  Goal: Ability to maintain a clear airway will improve  Description: Ability to maintain a clear airway will improve  Outcome: Ongoing  Goal: Ability to maintain adequate ventilation will improve  Description: Ability to maintain adequate ventilation will improve  Outcome: Ongoing     Problem: Restraint Use - Nonviolent/Non-Self-Destructive Behavior:  Goal: Absence of restraint indications  Description: Absence of restraint indications  Outcome: Not Met This Shift

## 2020-03-15 ENCOUNTER — APPOINTMENT (OUTPATIENT)
Dept: GENERAL RADIOLOGY | Age: 70
DRG: 207 | End: 2020-03-15
Attending: INTERNAL MEDICINE
Payer: MEDICARE

## 2020-03-15 ENCOUNTER — OUTSIDE FACILITY SERVICE (OUTPATIENT)
Dept: PULMONOLOGY | Facility: CLINIC | Age: 70
End: 2020-03-15

## 2020-03-15 LAB
ALBUMIN SERPL-MCNC: 2.9 G/DL (ref 3.5–5.2)
ALP BLD-CCNC: 51 U/L (ref 40–130)
ALT SERPL-CCNC: 15 U/L (ref 5–41)
ANION GAP SERPL CALCULATED.3IONS-SCNC: 13 MMOL/L (ref 7–19)
ANISOCYTOSIS: ABNORMAL
APTT: 37.5 SEC (ref 26–36.2)
APTT: 84.1 SEC (ref 26–36.2)
APTT: 95.4 SEC (ref 26–36.2)
AST SERPL-CCNC: 15 U/L (ref 5–40)
BASE EXCESS ARTERIAL: 3.6 MMOL/L (ref -2–2)
BASOPHILS ABSOLUTE: 0 K/UL (ref 0–0.2)
BASOPHILS RELATIVE PERCENT: 0.3 % (ref 0–1)
BILIRUB SERPL-MCNC: 0.4 MG/DL (ref 0.2–1.2)
BUN BLDV-MCNC: 30 MG/DL (ref 8–23)
CALCIUM SERPL-MCNC: 8.3 MG/DL (ref 8.8–10.2)
CARBOXYHEMOGLOBIN ARTERIAL: 2.1 % (ref 0–5)
CHLORIDE BLD-SCNC: 103 MMOL/L (ref 98–111)
CO2: 22 MMOL/L (ref 22–29)
CREAT SERPL-MCNC: 0.8 MG/DL (ref 0.5–1.2)
EOSINOPHILS ABSOLUTE: 0 K/UL (ref 0–0.6)
EOSINOPHILS RELATIVE PERCENT: 0.1 % (ref 0–5)
GFR NON-AFRICAN AMERICAN: >60
GLUCOSE BLD-MCNC: 116 MG/DL (ref 74–109)
GLUCOSE BLD-MCNC: 79 MG/DL (ref 70–99)
GLUCOSE BLD-MCNC: 86 MG/DL (ref 70–99)
GLUCOSE BLD-MCNC: 87 MG/DL (ref 70–99)
GLUCOSE BLD-MCNC: 95 MG/DL (ref 70–99)
HCO3 ARTERIAL: 29.1 MMOL/L (ref 22–26)
HCT VFR BLD CALC: 37.6 % (ref 42–52)
HCT VFR BLD CALC: 41.2 % (ref 42–52)
HEMOGLOBIN, ART, EXTENDED: 12.4 G/DL (ref 14–18)
HEMOGLOBIN: 11.6 G/DL (ref 14–18)
HEMOGLOBIN: 12 G/DL (ref 14–18)
HYPOCHROMIA: ABNORMAL
IMMATURE GRANULOCYTES #: 0.1 K/UL
INR BLD: 0.93 (ref 0.88–1.18)
LYMPHOCYTES ABSOLUTE: 1.4 K/UL (ref 1.1–4.5)
LYMPHOCYTES RELATIVE PERCENT: 19.5 % (ref 20–40)
MACROCYTES: ABNORMAL
MAGNESIUM: 3 MG/DL (ref 1.6–2.4)
MCH RBC QN AUTO: 28.4 PG (ref 27–31)
MCH RBC QN AUTO: 28.6 PG (ref 27–31)
MCHC RBC AUTO-ENTMCNC: 28.2 G/DL (ref 33–37)
MCHC RBC AUTO-ENTMCNC: 31.9 G/DL (ref 33–37)
MCV RBC AUTO: 101.5 FL (ref 80–94)
MCV RBC AUTO: 88.9 FL (ref 80–94)
METHEMOGLOBIN ARTERIAL: 1 %
MONOCYTES ABSOLUTE: 0.4 K/UL (ref 0–0.9)
MONOCYTES RELATIVE PERCENT: 5.7 % (ref 0–10)
NEUTROPHILS ABSOLUTE: 5.4 K/UL (ref 1.5–7.5)
NEUTROPHILS RELATIVE PERCENT: 73.7 % (ref 50–65)
O2 CONTENT ARTERIAL: 16.5 ML/DL
O2 SAT, ARTERIAL: 94.5 %
O2 THERAPY: ABNORMAL
PCO2 ARTERIAL: 47 MMHG (ref 35–45)
PDW BLD-RTO: 16.9 % (ref 11.5–14.5)
PDW BLD-RTO: 17.1 % (ref 11.5–14.5)
PERFORMED ON: NORMAL
PH ARTERIAL: 7.4 (ref 7.35–7.45)
PLATELET # BLD: 141 K/UL (ref 130–400)
PLATELET # BLD: 153 K/UL (ref 130–400)
PLATELET SLIDE REVIEW: ABNORMAL
PMV BLD AUTO: 10.5 FL (ref 9.4–12.4)
PMV BLD AUTO: 9.8 FL (ref 9.4–12.4)
PO2 ARTERIAL: 75 MMHG (ref 80–100)
POTASSIUM SERPL-SCNC: 4.5 MMOL/L (ref 3.5–5)
POTASSIUM, WHOLE BLOOD: 4.3
PROTHROMBIN TIME: 12.4 SEC (ref 12–14.6)
RBC # BLD: 4.06 M/UL (ref 4.7–6.1)
RBC # BLD: 4.23 M/UL (ref 4.7–6.1)
SODIUM BLD-SCNC: 138 MMOL/L (ref 136–145)
TEAR DROP CELLS: ABNORMAL
TOTAL PROTEIN: 6.1 G/DL (ref 6.6–8.7)
WBC # BLD: 7.4 K/UL (ref 4.8–10.8)
WBC # BLD: 9.2 K/UL (ref 4.8–10.8)

## 2020-03-15 PROCEDURE — 94640 AIRWAY INHALATION TREATMENT: CPT

## 2020-03-15 PROCEDURE — 85730 THROMBOPLASTIN TIME PARTIAL: CPT

## 2020-03-15 PROCEDURE — 36415 COLL VENOUS BLD VENIPUNCTURE: CPT

## 2020-03-15 PROCEDURE — 84132 ASSAY OF SERUM POTASSIUM: CPT

## 2020-03-15 PROCEDURE — 6360000002 HC RX W HCPCS: Performed by: INTERNAL MEDICINE

## 2020-03-15 PROCEDURE — 82803 BLOOD GASES ANY COMBINATION: CPT

## 2020-03-15 PROCEDURE — 80053 COMPREHEN METABOLIC PANEL: CPT

## 2020-03-15 PROCEDURE — 71045 X-RAY EXAM CHEST 1 VIEW: CPT

## 2020-03-15 PROCEDURE — 6370000000 HC RX 637 (ALT 250 FOR IP): Performed by: INTERNAL MEDICINE

## 2020-03-15 PROCEDURE — 93005 ELECTROCARDIOGRAM TRACING: CPT | Performed by: INTERNAL MEDICINE

## 2020-03-15 PROCEDURE — 94003 VENT MGMT INPAT SUBQ DAY: CPT

## 2020-03-15 PROCEDURE — 87632 RESP VIRUS 6-11 TARGETS: CPT

## 2020-03-15 PROCEDURE — 85027 COMPLETE CBC AUTOMATED: CPT

## 2020-03-15 PROCEDURE — 83735 ASSAY OF MAGNESIUM: CPT

## 2020-03-15 PROCEDURE — 2580000003 HC RX 258: Performed by: INTERNAL MEDICINE

## 2020-03-15 PROCEDURE — 36600 WITHDRAWAL OF ARTERIAL BLOOD: CPT

## 2020-03-15 PROCEDURE — 2100000000 HC CCU R&B

## 2020-03-15 PROCEDURE — 85025 COMPLETE CBC W/AUTO DIFF WBC: CPT

## 2020-03-15 PROCEDURE — 82947 ASSAY GLUCOSE BLOOD QUANT: CPT

## 2020-03-15 PROCEDURE — 85610 PROTHROMBIN TIME: CPT

## 2020-03-15 PROCEDURE — 99233 SBSQ HOSP IP/OBS HIGH 50: CPT | Performed by: INTERNAL MEDICINE

## 2020-03-15 PROCEDURE — 6360000002 HC RX W HCPCS: Performed by: HOSPITALIST

## 2020-03-15 PROCEDURE — 2700000000 HC OXYGEN THERAPY PER DAY

## 2020-03-15 PROCEDURE — C9113 INJ PANTOPRAZOLE SODIUM, VIA: HCPCS | Performed by: INTERNAL MEDICINE

## 2020-03-15 RX ADMIN — MORPHINE SULFATE 2 MG: 4 INJECTION INTRAVENOUS at 09:21

## 2020-03-15 RX ADMIN — IPRATROPIUM BROMIDE AND ALBUTEROL SULFATE 1 AMPULE: .5; 3 SOLUTION RESPIRATORY (INHALATION) at 06:44

## 2020-03-15 RX ADMIN — PIPERACILLIN SODIUM AND TAZOBACTAM SODIUM 3.38 G: 3; .375 INJECTION, POWDER, LYOPHILIZED, FOR SOLUTION INTRAVENOUS at 20:11

## 2020-03-15 RX ADMIN — METHYLPREDNISOLONE SODIUM SUCCINATE 20 MG: 40 INJECTION, POWDER, FOR SOLUTION INTRAMUSCULAR; INTRAVENOUS at 18:12

## 2020-03-15 RX ADMIN — IPRATROPIUM BROMIDE AND ALBUTEROL SULFATE 1 AMPULE: .5; 3 SOLUTION RESPIRATORY (INHALATION) at 18:17

## 2020-03-15 RX ADMIN — METOPROLOL TARTRATE 12.5 MG: 25 TABLET, FILM COATED ORAL at 20:11

## 2020-03-15 RX ADMIN — HEPARIN SODIUM 8 UNITS/KG/HR: 10000 INJECTION, SOLUTION INTRAVENOUS at 19:04

## 2020-03-15 RX ADMIN — METOPROLOL TARTRATE 12.5 MG: 25 TABLET, FILM COATED ORAL at 09:21

## 2020-03-15 RX ADMIN — PANTOPRAZOLE SODIUM 40 MG: 40 INJECTION, POWDER, FOR SOLUTION INTRAVENOUS at 09:21

## 2020-03-15 RX ADMIN — PROPOFOL 40 MCG/KG/MIN: 10 INJECTION, EMULSION INTRAVENOUS at 09:59

## 2020-03-15 RX ADMIN — IPRATROPIUM BROMIDE AND ALBUTEROL SULFATE 1 AMPULE: .5; 3 SOLUTION RESPIRATORY (INHALATION) at 14:29

## 2020-03-15 RX ADMIN — ATORVASTATIN CALCIUM 40 MG: 40 TABLET, FILM COATED ORAL at 20:11

## 2020-03-15 RX ADMIN — PROPOFOL 30 MCG/KG/MIN: 10 INJECTION, EMULSION INTRAVENOUS at 05:58

## 2020-03-15 RX ADMIN — IPRATROPIUM BROMIDE AND ALBUTEROL SULFATE 1 AMPULE: .5; 3 SOLUTION RESPIRATORY (INHALATION) at 10:43

## 2020-03-15 RX ADMIN — PROPOFOL 45 MCG/KG/MIN: 10 INJECTION, EMULSION INTRAVENOUS at 17:20

## 2020-03-15 RX ADMIN — PROPOFOL 45 MCG/KG/MIN: 10 INJECTION, EMULSION INTRAVENOUS at 20:07

## 2020-03-15 RX ADMIN — HEPARIN SODIUM 7 UNITS/KG/HR: 10000 INJECTION, SOLUTION INTRAVENOUS at 20:33

## 2020-03-15 RX ADMIN — PROPOFOL 45 MCG/KG/MIN: 10 INJECTION, EMULSION INTRAVENOUS at 02:23

## 2020-03-15 RX ADMIN — ASPIRIN 81 MG 81 MG: 81 TABLET ORAL at 09:21

## 2020-03-15 RX ADMIN — PIPERACILLIN SODIUM AND TAZOBACTAM SODIUM 3.38 G: 3; .375 INJECTION, POWDER, LYOPHILIZED, FOR SOLUTION INTRAVENOUS at 04:32

## 2020-03-15 RX ADMIN — HEPARIN SODIUM 4000 UNITS: 1000 INJECTION INTRAVENOUS; SUBCUTANEOUS at 06:18

## 2020-03-15 RX ADMIN — PROPOFOL 45 MCG/KG/MIN: 10 INJECTION, EMULSION INTRAVENOUS at 13:34

## 2020-03-15 RX ADMIN — PIPERACILLIN SODIUM AND TAZOBACTAM SODIUM 3.38 G: 3; .375 INJECTION, POWDER, LYOPHILIZED, FOR SOLUTION INTRAVENOUS at 13:35

## 2020-03-15 RX ADMIN — HEPARIN SODIUM 9 UNITS/KG/HR: 10000 INJECTION, SOLUTION INTRAVENOUS at 06:18

## 2020-03-15 ASSESSMENT — PAIN SCALES - GENERAL
PAINLEVEL_OUTOF10: 0
PAINLEVEL_OUTOF10: 5
PAINLEVEL_OUTOF10: 0

## 2020-03-15 ASSESSMENT — PULMONARY FUNCTION TESTS
PIF_VALUE: 37
PIF_VALUE: 40
PIF_VALUE: 40
PIF_VALUE: 38
PIF_VALUE: 34
PIF_VALUE: 48
PIF_VALUE: 38
PIF_VALUE: 37
PIF_VALUE: 34
PIF_VALUE: 35
PIF_VALUE: 52
PIF_VALUE: 41
PIF_VALUE: 25
PIF_VALUE: 39
PIF_VALUE: 35
PIF_VALUE: 48
PIF_VALUE: 33
PIF_VALUE: 45
PIF_VALUE: 31
PIF_VALUE: 32
PIF_VALUE: 36
PIF_VALUE: 33
PIF_VALUE: 36
PIF_VALUE: 31
PIF_VALUE: 40
PIF_VALUE: 36
PIF_VALUE: 30
PIF_VALUE: 30
PIF_VALUE: 38
PIF_VALUE: 32

## 2020-03-15 NOTE — PROGRESS NOTES
Contains abnormal data Blood Gas, Arterial   Status:  Final result    Ref Range & Units 03/15/20 0340   pH, Arterial 7.350 - 7.450 7.400    pCO2, Arterial 35.0 - 45.0 mmHg 47. 0High     pO2, Arterial 80.0 - 100.0 mmHg 75. 0Low     HCO3, Arterial 22.0 - 26.0 mmol/L 29.1High     Base Excess, Arterial -2.0 - 2.0 mmol/L 3.6High     Hemoglobin, Art, Extended 14.0 - 18.0 g/dL 12.4Low     O2 Sat, Arterial >92 % 94.5    Carboxyhgb, Arterial 0.0 - 5.0 % 2.1    Comment:      0.0-1.5   (Smokers 1.5-5.0)    Methemoglobin, Arterial <1.5 % 1.0    O2 Content, Arterial Not Established mL/dL 16.5    O2 Therapy  Unknown    Resulting Agency  [address] [facility]        Specimen Collected: 03/15/20 0340 Last Resulted: 03/15/20 0341 View Other Order Details        VC 14, , 30% FIO2, 5 PEEP

## 2020-03-15 NOTE — PROGRESS NOTES
Length of Stay:   LOS: 4 days      Chief complaint:  Acute hypoxia    Subjective:  Sedated on MV    Physical Examination:  /70   Pulse 58   Temp 97.6 °F (36.4 °C) (Temporal)   Resp 16   Ht 6' (1.829 m)   Wt 222 lb 14.4 oz (101.1 kg)   SpO2 97%   BMI 30.23 kg/m²   Constitutional - Appropriately groomed, good nutritional status  Psychiatric - Sedated  ENMT - lips, teeth, gums intact; hearing intact  Respiratory - Coarse BS bilaterally  Cardiovascular - Clear S1, S2 no gross m/r/g; pedal pulses intact  Abd - Soft, non-tender; No gross hernia  MSK - UE and LE joints intact, no gross clubbing  Skin - No rashes or wounds; no gross capillary refill defects  Neurologic - Sedated    Medications:    methylPREDNISolone, 20 mg, Intravenous, Daily    metoprolol tartrate, 12.5 mg, Oral, BID    pantoprazole, 40 mg, Intravenous, Daily    insulin lispro, 0-6 Units, Subcutaneous, TID WC    insulin lispro, 0-3 Units, Subcutaneous, Nightly    aspirin, 81 mg, Oral, Daily    atorvastatin, 40 mg, Oral, Nightly    ipratropium-albuterol, 1 ampule, Inhalation, Q4H WA    piperacillin-tazobactam, 3.375 g, Intravenous, Q8H      Problem list:  Active Problems:    CAD (coronary artery disease)    COPD exacerbation (HCC)    Hypertension    NSTEMI (non-ST elevated myocardial infarction) [facility])    Palliative care patient  Resolved Problems:    * No resolved hospital problems.  *        A/P:  []NSTEMI, COPD exacerbation, acute on chronic hypoxic respiratory failure, PNA  -Cardiology, pulmonary consulted  -Likely type 2 supply demand ischemia  -medical management per cardiology  -Possible cath on Monday  Continue steroids, nebs, abs  -Viral respiratory panel positive for RSV    []Mild epistaxis  Continue to monitor  Has stopped now, nose packed    []Hospital Issues  DVT prop - SCDs  Code - FULL  Disposition - Continue CCU       Severo Veliz M.D.,  3/13/20

## 2020-03-15 NOTE — PROGRESS NOTES
Assessment completed. Found patient with nose bleed from R nare. Face cleaned and attempted to pack nare for continued bleeding. With packing bleeding drained into back of throat and mouth requiring oral suctioning. Utilized a pack of 4x4 gauze to get bleeding controlled. Have since been able to lightly pack the nare with gauze. Patient was agitated during this and BP was elevated at 161/85. To assist in controlling bleeding I did increase propofol drip as well for his agitation.

## 2020-03-15 NOTE — PROGRESS NOTES
Provider   isosorbide mononitrate (IMDUR) 30 MG extended release tablet TAKE ONE TABLET BY MOUTH ONCE A DAY *DO NOT TAKE IF SYSTOLIC BLOOD PRESSURE IS LESS THAN 100* 10/3/19   CHALO Burciaga   metoprolol succinate (TOPROL XL) 25 MG extended release tablet TAKE 1 TABLET BY MOUTH DAILY 10/3/19   Tk Prim, APRN - NP   lisinopril (PRINIVIL;ZESTRIL) 5 MG tablet TAKE 1 TABLET BY MOUTH DAILY 10/3/19   Tk Prim, APRN - NP   atorvastatin (LIPITOR) 80 MG tablet TAKE 1 TABLET BY MOUTH ONCE DAILY 7/1/19   Marija Petty MD   tamsulosin (FLOMAX) 0.4 MG capsule Take 0.4 mg by mouth daily    Historical Provider, MD   guaiFENesin (MUCINEX) 600 MG extended release tablet Take 600 mg by mouth daily    Historical Provider, MD   prasugrel (EFFIENT) 10 MG TABS TAKE 1 TABLET BY MOUTH DAILY 5/29/19   CHALO Burciaga   furosemide (LASIX) 20 MG tablet Take 1 tablet by mouth daily as needed (weight gain of 3 lbs overnight or 5 pounds in a week.) 2/11/19   CHALO Burciaga   nitroGLYCERIN (NITROSTAT) 0.4 MG SL tablet Place 1 tablet under the tongue every 5 minutes as needed for Chest pain 8/28/18   CHALO Denton   aspirin 81 MG chewable tablet Take 1 tablet by mouth daily 6/8/18   Pierre Del Rosario MD   ipratropium-albuterol (DUONEB) 0.5-2.5 (3) MG/3ML SOLN nebulizer solution Inhale 3 mLs into the lungs every 4 hours (while awake) 6/7/18   Pierre Del Rosario MD        methylPREDNISolone  20 mg Intravenous Daily    metoprolol tartrate  12.5 mg Oral BID    pantoprazole  40 mg Intravenous Daily    insulin lispro  0-6 Units Subcutaneous TID WC    insulin lispro  0-3 Units Subcutaneous Nightly    aspirin  81 mg Oral Daily    atorvastatin  40 mg Oral Nightly    ipratropium-albuterol  1 ampule Inhalation Q4H WA    piperacillin-tazobactam  3.375 g Intravenous Q8H       TELEMETRY: Sinus     Physical Exam:      Physical Exam      General:  Awake, alert, NAD  Skin:  Warm and dry  Neck:  no jvd , no

## 2020-03-15 NOTE — PROGRESS NOTES
28.2 28.6   MCHC 31.4* 31.5* 28.2*   RDW 16.7* 16.8* 17.1*   BANDSPCT  --  2  --       Recent Labs     03/13/20  0129  03/14/20  0139 03/14/20  0346 03/15/20  0340 03/15/20  0403     --  141  --   --  138   K 4.0   < > 4.0 4.0 4.3 4.5     --  105  --   --  103   CO2 25  --  25  --   --  22   BUN 29*  --  30*  --   --  30*   CREATININE 0.9  --  0.9  --   --  0.8   CALCIUM 8.1*  --  8.0*  --   --  8.3*   GLUCOSE 127*  --  122*  --   --  116*    < > = values in this interval not displayed. Recent Labs     03/13/20  0422 03/14/20  0346 03/15/20  0340   PHART 7.500* 7.460* 7.400   DQL0KTI 35.0 39.0 47.0*   PO2ART 74.0* 75.0* 75.0*   FUT2RZH 27.3* 27.7* 29.1*   D3KTMNOP 95.2 95.0 94.5   BEART 4.2* 3.7* 3.6*     Recent Labs     03/15/20  0403   AST 15   ALT 15   ALKPHOS 51   BILITOT 0.4   MG 3.0*   CALCIUM 8.3*   INR 0.93     Recent Labs     03/13/20  1036   LABGRAM Moderate WBC's (Polymorphonuclear) present  Few Epithelial Cells present   Mixed bacterial morphotypes suggestive of  Normal respiratory herbie     CULTRESP Light growth normal respiratory herbie with*  Moderate growth  No further workup       Radiograph  XR CHEST PORTABLE 3/15/2020 5:00 AM   HISTORY: Mechanical ventilation   Comparison: 3/14/2020    Findings:    Endotracheal tube is in good position. Enteric tube courses below the   diaphragm with tip beyond the field-of-view. Underlying lung   emphysema. Right base infiltrate is nearly resolved. No pleural   effusion or pneumothorax. The cardiomediastinal silhouette and   pulmonary vascularity are within normal limits. The osseous structures and surrounding soft tissues demonstrate no   acute abnormality.       Impression   1. Nearly resolved paramedian infiltrate in the right lung base. Lungs   are otherwise clear. 2. Endotracheal tube is in good position. 3. Lung emphysema.    Signed by Dr Gillette on 3/15/2020 8:07 AM         My radiograph interpretation/independent review of imaging: Lung fields ET tube okay. COPD changes. Problem list as generated by [employer]:  Patient Active Problem List   Diagnosis    CAD (coronary artery disease)    Acute ST elevation myocardial infarction (STEMI) involving left anterior descending (LAD) coronary artery (HCC)    Electronic cigarette use    [facility] [address]    Chest pain    Syncope and collapse    COPD exacerbation ([facility] [address]    Hypertension    NSTEMI (non-ST elevated myocardial infarction) ([facility] [address]    Palliative care patient     Pulmonary Assessment:    1. Acute on chronic hypoxic respiratory failure still requiring vent stable but still unable to proceed with spontaneous breathing trial yet  2. Nstemi,  leading to above with history of coronary artery disease  3. Severe, stage III COPD with coexisting restrictive disease and severely reduced diffusion capacity. On BD  4. Congestive heart failure appears improved  5. Mild metabolic alkalosis       Recommend/plan:   Continue vent at current settings.   Continue daily abg and cxr for now   Unable to proceed with spontaneous breathing trials until he can tolerate spontaneous awakening trials  · Continue bronchodilators  · Continue to taper the steroids off  · Protonix for GI prophylaxis     Electronically signed by Noelle Gonzales on 3/15/2020 at 12:20 PM

## 2020-03-16 ENCOUNTER — APPOINTMENT (OUTPATIENT)
Dept: GENERAL RADIOLOGY | Age: 70
DRG: 207 | End: 2020-03-16
Attending: INTERNAL MEDICINE
Payer: MEDICARE

## 2020-03-16 ENCOUNTER — OUTSIDE FACILITY SERVICE (OUTPATIENT)
Dept: PULMONOLOGY | Facility: CLINIC | Age: 70
End: 2020-03-16

## 2020-03-16 LAB
ALBUMIN SERPL-MCNC: 2.6 G/DL (ref 3.5–5.2)
ALP BLD-CCNC: 47 U/L (ref 40–130)
ALT SERPL-CCNC: 20 U/L (ref 5–41)
ANION GAP SERPL CALCULATED.3IONS-SCNC: 10 MMOL/L (ref 7–19)
APTT: 41.7 SEC (ref 26–36.2)
APTT: 46.6 SEC (ref 26–36.2)
APTT: 67.4 SEC (ref 26–36.2)
AST SERPL-CCNC: 20 U/L (ref 5–40)
BASE EXCESS ARTERIAL: 5.6 MMOL/L (ref -2–2)
BASOPHILS ABSOLUTE: 0 K/UL (ref 0–0.2)
BASOPHILS RELATIVE PERCENT: 0.4 % (ref 0–1)
BILIRUB SERPL-MCNC: 0.3 MG/DL (ref 0.2–1.2)
BLOOD CULTURE, ROUTINE: NORMAL
BUN BLDV-MCNC: 32 MG/DL (ref 8–23)
CALCIUM SERPL-MCNC: 8 MG/DL (ref 8.8–10.2)
CARBOXYHEMOGLOBIN ARTERIAL: 2 % (ref 0–5)
CHLORIDE BLD-SCNC: 107 MMOL/L (ref 98–111)
CO2: 24 MMOL/L (ref 22–29)
CREAT SERPL-MCNC: 0.7 MG/DL (ref 0.5–1.2)
CULTURE, BLOOD 2: NORMAL
CULTURE, RESPIRATORY: ABNORMAL
CULTURE, RESPIRATORY: ABNORMAL
EKG P AXIS: 48 DEGREES
EKG P AXIS: 61 DEGREES
EKG P AXIS: 64 DEGREES
EKG P-R INTERVAL: 142 MS
EKG P-R INTERVAL: 158 MS
EKG P-R INTERVAL: 158 MS
EKG Q-T INTERVAL: 474 MS
EKG Q-T INTERVAL: 498 MS
EKG Q-T INTERVAL: 552 MS
EKG QRS DURATION: 122 MS
EKG QRS DURATION: 128 MS
EKG QRS DURATION: 138 MS
EKG QTC CALCULATION (BAZETT): 471 MS
EKG QTC CALCULATION (BAZETT): 493 MS
EKG QTC CALCULATION (BAZETT): 530 MS
EKG T AXIS: 33 DEGREES
EKG T AXIS: 60 DEGREES
EKG T AXIS: 72 DEGREES
EOSINOPHILS ABSOLUTE: 0 K/UL (ref 0–0.6)
EOSINOPHILS RELATIVE PERCENT: 0.3 % (ref 0–5)
GFR NON-AFRICAN AMERICAN: >60
GLUCOSE BLD-MCNC: 137 MG/DL (ref 74–109)
GLUCOSE BLD-MCNC: 80 MG/DL (ref 70–99)
GLUCOSE BLD-MCNC: 81 MG/DL (ref 70–99)
GRAM STAIN RESULT: ABNORMAL
HCO3 ARTERIAL: 31.1 MMOL/L (ref 22–26)
HCT VFR BLD CALC: 39.2 % (ref 42–52)
HEMOGLOBIN, ART, EXTENDED: 12.3 G/DL (ref 14–18)
HEMOGLOBIN: 11.5 G/DL (ref 14–18)
IMMATURE GRANULOCYTES #: 0.1 K/UL
INR BLD: 0.95 (ref 0.88–1.18)
LYMPHOCYTES ABSOLUTE: 1.4 K/UL (ref 1.1–4.5)
LYMPHOCYTES RELATIVE PERCENT: 19.3 % (ref 20–40)
MAGNESIUM: 2.6 MG/DL (ref 1.6–2.4)
MCH RBC QN AUTO: 28.4 PG (ref 27–31)
MCHC RBC AUTO-ENTMCNC: 29.3 G/DL (ref 33–37)
MCV RBC AUTO: 96.8 FL (ref 80–94)
METHEMOGLOBIN ARTERIAL: 1.3 %
MONOCYTES ABSOLUTE: 0.3 K/UL (ref 0–0.9)
MONOCYTES RELATIVE PERCENT: 4.1 % (ref 0–10)
NEUTROPHILS ABSOLUTE: 5.3 K/UL (ref 1.5–7.5)
NEUTROPHILS RELATIVE PERCENT: 74.6 % (ref 50–65)
O2 CONTENT ARTERIAL: 16.5 ML/DL
O2 SAT, ARTERIAL: 95 %
O2 THERAPY: ABNORMAL
ORGANISM: ABNORMAL
PCO2 ARTERIAL: 48 MMHG (ref 35–45)
PDW BLD-RTO: 17 % (ref 11.5–14.5)
PERFORMED ON: NORMAL
PERFORMED ON: NORMAL
PH ARTERIAL: 7.42 (ref 7.35–7.45)
PLATELET # BLD: 140 K/UL (ref 130–400)
PMV BLD AUTO: 10 FL (ref 9.4–12.4)
PO2 ARTERIAL: 84 MMHG (ref 80–100)
POTASSIUM SERPL-SCNC: 4.5 MMOL/L (ref 3.5–5)
POTASSIUM, WHOLE BLOOD: 4.2
PROTHROMBIN TIME: 12.6 SEC (ref 12–14.6)
RBC # BLD: 4.05 M/UL (ref 4.7–6.1)
SODIUM BLD-SCNC: 141 MMOL/L (ref 136–145)
TOTAL PROTEIN: 5.6 G/DL (ref 6.6–8.7)
WBC # BLD: 7.1 K/UL (ref 4.8–10.8)

## 2020-03-16 PROCEDURE — 6370000000 HC RX 637 (ALT 250 FOR IP): Performed by: INTERNAL MEDICINE

## 2020-03-16 PROCEDURE — 6360000002 HC RX W HCPCS: Performed by: HOSPITALIST

## 2020-03-16 PROCEDURE — 2100000000 HC CCU R&B

## 2020-03-16 PROCEDURE — 82947 ASSAY GLUCOSE BLOOD QUANT: CPT

## 2020-03-16 PROCEDURE — 85730 THROMBOPLASTIN TIME PARTIAL: CPT

## 2020-03-16 PROCEDURE — 99232 SBSQ HOSP IP/OBS MODERATE 35: CPT | Performed by: NURSE PRACTITIONER

## 2020-03-16 PROCEDURE — 99233 SBSQ HOSP IP/OBS HIGH 50: CPT | Performed by: INTERNAL MEDICINE

## 2020-03-16 PROCEDURE — 2700000000 HC OXYGEN THERAPY PER DAY

## 2020-03-16 PROCEDURE — C9113 INJ PANTOPRAZOLE SODIUM, VIA: HCPCS | Performed by: INTERNAL MEDICINE

## 2020-03-16 PROCEDURE — 93005 ELECTROCARDIOGRAM TRACING: CPT | Performed by: INTERNAL MEDICINE

## 2020-03-16 PROCEDURE — 85610 PROTHROMBIN TIME: CPT

## 2020-03-16 PROCEDURE — 2580000003 HC RX 258: Performed by: INTERNAL MEDICINE

## 2020-03-16 PROCEDURE — 93010 ELECTROCARDIOGRAM REPORT: CPT | Performed by: INTERNAL MEDICINE

## 2020-03-16 PROCEDURE — 6360000002 HC RX W HCPCS: Performed by: INTERNAL MEDICINE

## 2020-03-16 PROCEDURE — 82803 BLOOD GASES ANY COMBINATION: CPT

## 2020-03-16 PROCEDURE — 80053 COMPREHEN METABOLIC PANEL: CPT

## 2020-03-16 PROCEDURE — 94003 VENT MGMT INPAT SUBQ DAY: CPT

## 2020-03-16 PROCEDURE — 85025 COMPLETE CBC W/AUTO DIFF WBC: CPT

## 2020-03-16 PROCEDURE — 36600 WITHDRAWAL OF ARTERIAL BLOOD: CPT

## 2020-03-16 PROCEDURE — 84132 ASSAY OF SERUM POTASSIUM: CPT

## 2020-03-16 PROCEDURE — 83735 ASSAY OF MAGNESIUM: CPT

## 2020-03-16 PROCEDURE — 94640 AIRWAY INHALATION TREATMENT: CPT

## 2020-03-16 PROCEDURE — 36415 COLL VENOUS BLD VENIPUNCTURE: CPT

## 2020-03-16 PROCEDURE — 71045 X-RAY EXAM CHEST 1 VIEW: CPT

## 2020-03-16 RX ORDER — SODIUM CHLORIDE 0.9 % (FLUSH) 0.9 %
10 SYRINGE (ML) INJECTION EVERY 12 HOURS SCHEDULED
Status: DISCONTINUED | OUTPATIENT
Start: 2020-03-16 | End: 2020-03-27 | Stop reason: HOSPADM

## 2020-03-16 RX ORDER — SODIUM CHLORIDE 0.9 % (FLUSH) 0.9 %
10 SYRINGE (ML) INJECTION PRN
Status: DISCONTINUED | OUTPATIENT
Start: 2020-03-16 | End: 2020-03-27 | Stop reason: HOSPADM

## 2020-03-16 RX ORDER — LIDOCAINE HYDROCHLORIDE 10 MG/ML
5 INJECTION, SOLUTION EPIDURAL; INFILTRATION; INTRACAUDAL; PERINEURAL ONCE
Status: DISCONTINUED | OUTPATIENT
Start: 2020-03-16 | End: 2020-03-27 | Stop reason: HOSPADM

## 2020-03-16 RX ADMIN — PIPERACILLIN SODIUM AND TAZOBACTAM SODIUM 3.38 G: 3; .375 INJECTION, POWDER, LYOPHILIZED, FOR SOLUTION INTRAVENOUS at 05:31

## 2020-03-16 RX ADMIN — PIPERACILLIN SODIUM AND TAZOBACTAM SODIUM 3.38 G: 3; .375 INJECTION, POWDER, LYOPHILIZED, FOR SOLUTION INTRAVENOUS at 13:37

## 2020-03-16 RX ADMIN — HEPARIN SODIUM 8 UNITS/KG/HR: 10000 INJECTION, SOLUTION INTRAVENOUS at 12:21

## 2020-03-16 RX ADMIN — ATORVASTATIN CALCIUM 40 MG: 40 TABLET, FILM COATED ORAL at 20:30

## 2020-03-16 RX ADMIN — PROPOFOL 45 MCG/KG/MIN: 10 INJECTION, EMULSION INTRAVENOUS at 05:55

## 2020-03-16 RX ADMIN — IPRATROPIUM BROMIDE AND ALBUTEROL SULFATE 1 AMPULE: .5; 3 SOLUTION RESPIRATORY (INHALATION) at 14:00

## 2020-03-16 RX ADMIN — PIPERACILLIN SODIUM AND TAZOBACTAM SODIUM 3.38 G: 3; .375 INJECTION, POWDER, LYOPHILIZED, FOR SOLUTION INTRAVENOUS at 20:30

## 2020-03-16 RX ADMIN — MORPHINE SULFATE 2 MG: 4 INJECTION INTRAVENOUS at 12:21

## 2020-03-16 RX ADMIN — PROPOFOL 45 MCG/KG/MIN: 10 INJECTION, EMULSION INTRAVENOUS at 23:13

## 2020-03-16 RX ADMIN — IPRATROPIUM BROMIDE AND ALBUTEROL SULFATE 1 AMPULE: .5; 3 SOLUTION RESPIRATORY (INHALATION) at 18:17

## 2020-03-16 RX ADMIN — PROPOFOL 45 MCG/KG/MIN: 10 INJECTION, EMULSION INTRAVENOUS at 19:27

## 2020-03-16 RX ADMIN — PROPOFOL 45 MCG/KG/MIN: 10 INJECTION, EMULSION INTRAVENOUS at 02:17

## 2020-03-16 RX ADMIN — METOPROLOL TARTRATE 12.5 MG: 25 TABLET, FILM COATED ORAL at 20:30

## 2020-03-16 RX ADMIN — SODIUM CHLORIDE, PRESERVATIVE FREE 10 ML: 5 INJECTION INTRAVENOUS at 20:30

## 2020-03-16 RX ADMIN — PROPOFOL 45 MCG/KG/MIN: 10 INJECTION, EMULSION INTRAVENOUS at 16:31

## 2020-03-16 RX ADMIN — IPRATROPIUM BROMIDE AND ALBUTEROL SULFATE 1 AMPULE: .5; 3 SOLUTION RESPIRATORY (INHALATION) at 10:08

## 2020-03-16 RX ADMIN — IPRATROPIUM BROMIDE AND ALBUTEROL SULFATE 1 AMPULE: .5; 3 SOLUTION RESPIRATORY (INHALATION) at 06:13

## 2020-03-16 RX ADMIN — PANTOPRAZOLE SODIUM 40 MG: 40 INJECTION, POWDER, FOR SOLUTION INTRAVENOUS at 08:49

## 2020-03-16 RX ADMIN — METOPROLOL TARTRATE 12.5 MG: 25 TABLET, FILM COATED ORAL at 08:49

## 2020-03-16 RX ADMIN — PROPOFOL 45 MCG/KG/MIN: 10 INJECTION, EMULSION INTRAVENOUS at 12:00

## 2020-03-16 RX ADMIN — ASPIRIN 81 MG 81 MG: 81 TABLET ORAL at 08:49

## 2020-03-16 ASSESSMENT — PAIN SCALES - GENERAL
PAINLEVEL_OUTOF10: 0
PAINLEVEL_OUTOF10: 0
PAINLEVEL_OUTOF10: 4
PAINLEVEL_OUTOF10: 0

## 2020-03-16 ASSESSMENT — PULMONARY FUNCTION TESTS
PIF_VALUE: 32
PIF_VALUE: 31
PIF_VALUE: 43
PIF_VALUE: 28
PIF_VALUE: 35
PIF_VALUE: 42
PIF_VALUE: 40
PIF_VALUE: 32
PIF_VALUE: 29
PIF_VALUE: 40
PIF_VALUE: 43
PIF_VALUE: 34
PIF_VALUE: 32
PIF_VALUE: 33
PIF_VALUE: 45
PIF_VALUE: 43
PIF_VALUE: 33
PIF_VALUE: 43
PIF_VALUE: 60
PIF_VALUE: 37
PIF_VALUE: 33
PIF_VALUE: 37
PIF_VALUE: 31
PIF_VALUE: 29
PIF_VALUE: 33
PIF_VALUE: 36
PIF_VALUE: 46
PIF_VALUE: 31
PIF_VALUE: 39

## 2020-03-16 NOTE — PROGRESS NOTES
Pulmonary and Critical Care Progress Note Kristi Silva [place]   MR# [id number]  Acct# [[id number]]  3/16/2020   9:56 AM CDT   Referring Provider: Jody Solorzano MD  Chief Complaint: Mechanically ventilated    HPI: Patient remains sedated and mechanically ventilated. Nose is packed with gauze with some blood noted. Per nursing, still oozing but much improved. Cardiology decision for heart cath pending at this time. Echo pending. He remains on low-dose methylprednisolone and mechanical ventilation and sedation. Nursing reports no additional problems. Past Medical History  Past Medical History:   Diagnosis Date    CAD (coronary artery disease)     Chest pain 11/14/2018    CHF (congestive heart failure) (HCC)     COPD (chronic obstructive pulmonary disease) (HCC)     Hyperlipidemia     Hypertension     Palliative care patient 03/12/2020    Pneumonia      Surgical History  Past Surgical History:   Procedure Laterality Date    APPENDECTOMY      BACK SURGERY      COLONOSCOPY      ENDOSCOPY, COLON, DIAGNOSTIC       Allergies  No Known Allergies  Medications    metoprolol tartrate, 12.5 mg, Oral, BID    pantoprazole, 40 mg, Intravenous, Daily    insulin lispro, 0-6 Units, Subcutaneous, TID WC    insulin lispro, 0-3 Units, Subcutaneous, Nightly    aspirin, 81 mg, Oral, Daily    atorvastatin, 40 mg, Oral, Nightly    ipratropium-albuterol, 1 ampule, Inhalation, Q4H WA    piperacillin-tazobactam, 3.375 g, Intravenous, Q8H   Social History   reports that he quit smoking about 21 months ago. His smoking use included cigarettes. He has a 50.00 pack-year smoking history. His smokeless tobacco use includes chew. He reports previous alcohol use. He reports that he does not use drugs. Family History  family history includes Cancer in his brother and sister.   Review of Systems:  Cannot obtain due to mechanical ventilation  Physical Exam:   height is 6' (1.829 m) and weight is 219 lb Lung fields ET tube okay. COPD changes. Stable compared to 3/15/20     Problem list as generated by [employer]:  Patient Active Problem List   Diagnosis    CAD (coronary artery disease)    Acute ST elevation myocardial infarction (STEMI) involving left anterior descending (LAD) coronary artery (HCC)    Electronic cigarette use    [facility]) [facility] [address]    Chest pain    Syncope and collapse    COPD exacerbation ([facility] [address]    Hypertension    NSTEMI (non-ST elevated myocardial infarction) ([facility] [address]    Palliative care patient     Pulmonary Assessment:    1. Acute on chronic hypoxic respiratory failure still requiring vent stable but still unable to proceed with spontaneous breathing trial yet  2. Nstemi,  leading to above with history of coronary artery disease  3. Severe, stage III COPD with coexisting restrictive disease and severely reduced diffusion capacity. On BD  4. Congestive heart failure appears improved  5. Mild metabolic alkalosis       Recommend/plan:   Continue vent at current settings. Continue daily abg and cxr for now   Unable to proceed with spontaneous breathing trials until he can tolerate spontaneous awakening trials  · Continue bronchodilators  · Now off of steroids   · Protonix for GI prophylaxis     Electronically signed by Jessi Valentine on 3/16/2020 at 10:16 AM    Physician Substantive portion:  He remains sedated, mechanically ventilated. Nasal packing is still in place, with some soiling of that. He was unable to awaken to do any spontaneous breathing trials yet. Cardiology reevaluation underway. Exam shows diminished breath sounds endotracheal tube in place packing in place. There is no ventilator dyssynchrony. We will continue the same for now. Arterial blood gas shows mild alkalosis which is metabolic. We will follow that with follow-up ABG. Chest x-ray shows COPD changes and improved parenchymal densities. Overall stable pulmonary process.   I am

## 2020-03-16 NOTE — PROGRESS NOTES
Nutrition Assessment (Enteral Nutrition)    Type and Reason for Visit: Reassess    Nutrition Recommendations: continue current nutritional interventions, modifying as Propofol rate changes    Nutrition Assessment: TF continues at 20ml/hr with 40ml free water hourly and Proteinex x 3 as tf flush. Malnutrition Assessment:  · Malnutrition Status: No malnutrition  · Context: Acute illness or injury  · Findings of the 6 clinical characteristics of malnutrition (Minimum of 2 out of 6 clinical characteristics is required to make the diagnosis of moderate or severe Protein Calorie Malnutrition based on AND/ASPEN Guidelines):  1. Energy Intake-Unable to assess,      2. Weight Loss-No significant weight loss,    3. Fat Loss-No significant subcutaneous fat loss,    4. Muscle Loss-No significant muscle mass loss,    5. Fluid Accumulation-Mild fluid accumulation, Extremities  6.   Strength-Not measured    Nutrition Risk Level: High    Nutrition Needs:  · Estimated Daily Total Kcal: 2182-0198(11-14 kcal/kg)  · Estimated Daily Protein (g): 162-177(2-2.2 g/kg)  · Estimated Daily Fluid (ml/day): 8703-7199    Nutrition Diagnosis:   · Problem: Inadequate oral intake  · Etiology: related to Impaired respiratory function-inability to consume food     Signs and symptoms:  as evidenced by NPO status due to medical condition, Intubation, Nutrition support - EN    Objective Information:  · Nutrition-Focused Physical Findings:    · Wound Type: None  · Current Nutrition Therapies:  · Oral Diet Orders: NPO   · Oral Diet intake: NPO  · Oral Nutrition Supplement (ONS) Orders: None  · ONS intake: NPO  · Tube Feeding (TF) Orders:   · Feeding Route: Nasogastric  · Formula: Low Calorie, High Protein  · Rate (ml/hr):20    · Volume (ml/day): 480ml  · Duration: Continuous  · Additives/Modulars: Protein  · Water Flushes: 40ml every hour via tf pump flush  · Current TF & Flush Orders Provides: 20ml = 480kcals with 42g protein, 53g CHO and 401 ml free water from formula, 300 kcals and 78g protein from Proteinex and 960ml free water from tf pump flush   · Goal TF & Flush Orders Provides: 20ml = 480kcals with 42g protein, 53g CHO and 401 ml free water from formula, 300 kcals and 78g protein from Proteinex and 960ml free water from tf pump flush   · Additional Calories: Propofol 28.9ml = 762 NP kcals     · Anthropometric Measures:  · Ht: 6' (182.9 cm)   · Current Body Wt: 219 lb (99.3 kg)  · Admission Body Wt: 222 lb (100.7 kg)  · Usual Body Wt: (215-220#)  · Ideal Body Wt: 178 lb (80.7 kg), % Ideal Body 123%  · BMI Classification: BMI 25.0 - 29.9 Overweight    Nutrition Interventions:   Continue NPO, Continue current Tube Feeding, Continue current ONS  Continued Inpatient Monitoring    Nutrition Evaluation:   · Evaluation: Progressing toward goals   · Goals: meet nutritional needs via EN   · Monitoring: TF Intake, TF Tolerance, Skin Integrity, Weight, Pertinent Labs, Chewing/Swallowing      Electronically signed by Fern Akins, MS, RD, LD on 3/16/20 at 1:02 PM CDT    Contact Number: [phone]

## 2020-03-16 NOTE — PROGRESS NOTES
/77   Pulse 56   Temp 96.7 °F (35.9 °C) (Temporal)   Resp 14   Ht 6' (1.829 m)   Wt 219 lb (99.3 kg)   SpO2 98%   BMI 29.70 kg/m²     Intake/Output Summary (Last 24 hours) at 3/16/2020 1844  Last data filed at 3/16/2020 1756  Gross per 24 hour   Intake 1668.09 ml   Output 2625 ml   Net -956.91 ml       Physical Examination:    /77   Pulse 56   Temp 96.7 °F (35.9 °C) (Temporal)   Resp 14   Ht 6' (1.829 m)   Wt 219 lb (99.3 kg)   SpO2 98%   BMI 29.70 kg/m²     GENERAL - well developed and well nourished; is not an active participant in this examination  HEENT -  PERRLA, Hearing not tested, conjunctiva and lids are normal, ears and nose appear normal  NECK - no thyromegaly, no JVD, trachea is in the midline  CARDIOVASCULAR - PMI is in the left mid line clavicular position, Normal S1 and S2 with a grade 1/6 systolic murmur. No S3 or S4    PULMONARY - Yes: intubated respiratory distress. scattered wheezes and rales. Breath sounds in both  lung fields are Decreased  ABDOMEN  - soft, non tender, no rebound, no hepatomegaly or splenomegaly  MUSCULOSKELETAL  - Prone/Supine, digitals and nails are without clubbing or cyanosis  EXTREMITIES - trace edema  NEUROLOGIC - not tested  SKIN - turgor is normal, no rash  PSYCHIATRIC -not tested      LABORATORY EVALUATION & TESTING:    I have personally reviewed and interpreted the results of the following diagnostic endeavors     CBC:   Recent Labs     03/15/20  1246 03/16/20  0209   WBC 9.2 7.1   HGB 12.0* 11.5*   HCT 37.6* 39.2*    140     BMP:   Recent Labs     03/15/20  0403 03/16/20  0209 03/16/20  0419    141  --    K 4.5 4.5 4.2   CO2 22 24  --    BUN 30* 32*  --    CREATININE 0.8 0.7  --    LABGLOM >60 >60  --    GLUCOSE 116* 137*  --      BNP: No results for input(s): BNP in the last 72 hours.   PT/INR:   Recent Labs     03/15/20  0403 03/16/20  0209   PROTIME 12.4 12.6   INR 0.93 0.95     APTT:  Recent Labs     03/16/20  0209 bare metal stents. It is possible that they could have restenosed or his had disease progression in the other diffusely diseased arteries. No Continue current medications:     Yes:            2. CAD Initial presentation during this evaluation   Review and summation of old records:    2000 stents  6/5/18  Anterior STEMI, AUC indication 2, AUC score 7  6/5/18  Cath 99% mid LAD, 2.5 x 18 and 2.5 x 28 BMS, diffuse CAD, anterior lateral dyskinesis, EF 40%  3/13/2020  Echo  Normal LVFX   No Continue current medications:    Yes:            3. Concern regarding systemic blood pressure Initial presentation during this evaluation Systolic (55CKG), RZI:574 , Min:111 , SMX:356    Diastolic (35GQY), RNB:51, Min:62, Max:84   No Continue current medications:       yes                 PLAN:    1. Continue present medications except for changes as noted above  2. Continue to monitor rhythm  3. Further orders per clinical course. Summary of hospital course thus far:      Date Clinical Event Plan of Treatment        03/16/20 Reviewed films from 6/5/2018 Discussion regarding goal of this clinical course          Discussed with nursing.     Electronically signed by Oksana De La Paz MD on 3/16/20    [facility] [place] [facility]

## 2020-03-16 NOTE — PROGRESS NOTES
Palliative Care Progress Note  3/16/2020 12:59 PM  Subjective:   Admit Date: 3/11/2020  PCP: Savage Cruz MD    Chief Complaint: Intubated, mechanically ventilated    Interval History: Patient remains in CCU intubated, sedated, mechanically ventilated. No major changes over the weekend, he continues to be followed by pulmonology and cardiology. Awaiting evaluation by cardiology for possible cardiac cath. Review of Systems   Unable to obtain, intubated and mechanically ventilated    DIET TUBE FEED CONTINUOUS/CYCLIC NPO; Low Calorie High Protein (Vital Hi Pro);  Nasogastric; Continuous; 20; 20    Medications:   dextrose      heparin (porcine) 8 Units/kg/hr (03/16/20 1221)    propofol 45 mcg/kg/min (03/16/20 0555)     Current Facility-Administered Medications   Medication Dose Route Frequency Provider Last Rate Last Dose    metoprolol tartrate (LOPRESSOR) tablet 12.5 mg  12.5 mg Oral BID Caesar Mathews MD   12.5 mg at 03/16/20 0849    morphine injection 2 mg  2 mg Intravenous Q1H PRN Thomas Collins MD   2 mg at 03/16/20 1221    pantoprazole (PROTONIX) injection 40 mg  40 mg Intravenous Daily Holli Cullen MD   40 mg at 03/16/20 0849    insulin lispro (HUMALOG) injection vial 0-6 Units  0-6 Units Subcutaneous TID WC Holli Cullen MD        insulin lispro (HUMALOG) injection vial 0-3 Units  0-3 Units Subcutaneous Nightly Holli Cullen MD   1 Units at 03/11/20 2211    aspirin chewable tablet 81 mg  81 mg Oral Daily Holli Cullen MD   81 mg at 03/16/20 0849    atorvastatin (LIPITOR) tablet 40 mg  40 mg Oral Nightly Holli Cullen MD   40 mg at 03/15/20 2011    glucose (GLUTOSE) 40 % oral gel 15 g  15 g Oral PRN Holli Cullen MD        dextrose 50 % IV solution  12.5 g Intravenous PRN Holli Cullen MD        glucagon (rDNA) injection 1 mg  1 mg Intramuscular PRN Holli Cullen MD        dextrose 5 % solution  100 mL/hr Intravenous PRN Holli Cullen MD       [facility] 95.0 94.5 95.0   CARBOXHGBART 1.7 2.1 2.0   02THERAPY Unknown Unknown Unknown     HgBA1c: No results for input(s): LABA1C in the last 72 hours. FLP:    Lab Results   Component Value Date    TRIG 52 03/12/2020    HDL 64 03/12/2020    LDLCALC 85 03/12/2020     TSH:  No results found for: TSH  Troponin T: No results for input(s): TROPONINI in the last 72 hours. INR:   Recent Labs     03/14/20  0139 03/15/20  0403 03/16/20  0209   INR 1.00 0.93 0.95       Objective:   Vitals: /77   Pulse 60   Temp 96.7 °F (35.9 °C) (Temporal)   Resp 22   Ht 6' (1.829 m)   Wt 219 lb (99.3 kg)   SpO2 97%   BMI 29.70 kg/m²   24HR INTAKE/OUTPUT:      Intake/Output Summary (Last 24 hours) at 3/16/2020 1259  Last data filed at 3/16/2020 0600  Gross per 24 hour   Intake 1930.23 ml   Output 2250 ml   Net -319.77 ml     Physical Exam  General appearance: sedated, intubated, no acute distress, appears ill  HEENT: atraumatic, eyes with clear conjunctiva and normal lids, pupils and irises normal, external ears normal, nares packed due to epistaxis, lips normal, ETT in place  Neck: without masses, lymphadenopathy, supple  Lungs: no increased work of breathing, ventilating bilaterally, diminished   Heart: regular rate and rhythm and S1, S2 normal, bradycardia  Abdomen: soft, non-tender; bowel sounds normal; no masses,  no organomegaly  Genitourinary: No bladder fullness, masses, or tenderness  Extremities: BUE cool to touch, distal pulses palpable, 1+ BLE edema  Neurologic:sedated  Psychiatric: sedated  Skin: cool, dry           Assessment and Plan: Active Problems:    CAD (coronary artery disease)    COPD exacerbation (HCC)    Hypertension    NSTEMI (non-ST elevated myocardial infarction) [facility])    Palliative care patient  Resolved Problems:    * No resolved hospital problems. *        Visit Summary: I saw the patient at the bedside with no family present. Patient has had no major improvement since previous visit.   Family is currently awaiting information from cardiology before making any decisions on goals of care. Patient spouse has been very realistic about the patient's health stating that she did not feel he was going to be able to come off the ventilator. If patient does not improve and is not able to liberate from the vent, I would recommend pursuit of hospice care for palliative extubation. I can discuss this with the patient's spouse wants recommendations have been made by cardiology and all other providers are in agreement. Palliative medicine will continue to follow. Recommendations: Await recommendations from cardiology, continue to discuss goals dependent on his progress throughout hospitalization, if unable to liberate from vent would recommend hospice care    Thank you for consulting Palliative Care and allowing us to participate in the care of this patient.        Electronically signed by CHALO Davis on 3/16/2020 at 12:59 PM    (Please note that portions of this note were completed with a voice recognition program.  Soha Moreno made to edit the dictations but occasionally words are mis-transcribed.)

## 2020-03-16 NOTE — PROGRESS NOTES
Contains abnormal data Blood Gas, Arterial   Status:  Final result    Ref Range & Units 03/16/20 0419   pH, Arterial 7.350 - 7.450 7.420    pCO2, Arterial 35.0 - 45.0 mmHg 48. 0High     pO2, Arterial 80.0 - 100.0 mmHg 84.0    HCO3, Arterial 22.0 - 26.0 mmol/L 31.1High     Base Excess, Arterial -2.0 - 2.0 mmol/L 5.6High     Hemoglobin, Art, Extended 14.0 - 18.0 g/dL 12.3Low     O2 Sat, Arterial >92 % 95.0    Carboxyhgb, Arterial 0.0 - 5.0 % 2.0    Comment:      0.0-1.5   (Smokers 1.5-5.0)    Methemoglobin, Arterial <1.5 % 1.3    O2 Content, Arterial Not Established mL/dL 16.5    O2 Therapy  Unknown    Resulting Agency  [facility]        Specimen Collected: 03/16/20 0419 Last Resulted: 03/16/20 0420 View Other Order Details        VC 14, , 30% FIO2, 5 PEEP

## 2020-03-17 ENCOUNTER — OUTSIDE FACILITY SERVICE (OUTPATIENT)
Dept: PULMONOLOGY | Facility: CLINIC | Age: 70
End: 2020-03-17

## 2020-03-17 ENCOUNTER — APPOINTMENT (OUTPATIENT)
Dept: GENERAL RADIOLOGY | Age: 70
DRG: 207 | End: 2020-03-17
Attending: INTERNAL MEDICINE
Payer: MEDICARE

## 2020-03-17 LAB
ALBUMIN SERPL-MCNC: 2.9 G/DL (ref 3.5–5.2)
ALP BLD-CCNC: 49 U/L (ref 40–130)
ALT SERPL-CCNC: 32 U/L (ref 5–41)
ANION GAP SERPL CALCULATED.3IONS-SCNC: 13 MMOL/L (ref 7–19)
APTT: 63.5 SEC (ref 26–36.2)
APTT: 78.9 SEC (ref 26–36.2)
APTT: 79.6 SEC (ref 26–36.2)
APTT: 91.5 SEC (ref 26–36.2)
AST SERPL-CCNC: 35 U/L (ref 5–40)
BASE EXCESS ARTERIAL: 5.4 MMOL/L (ref -2–2)
BASOPHILS ABSOLUTE: 0.1 K/UL (ref 0–0.2)
BASOPHILS RELATIVE PERCENT: 1.1 % (ref 0–1)
BILIRUB SERPL-MCNC: 0.4 MG/DL (ref 0.2–1.2)
BUN BLDV-MCNC: 36 MG/DL (ref 8–23)
CALCIUM SERPL-MCNC: 8.2 MG/DL (ref 8.8–10.2)
CARBOXYHEMOGLOBIN ARTERIAL: 1.8 % (ref 0–5)
CHLORIDE BLD-SCNC: 104 MMOL/L (ref 98–111)
CO2: 25 MMOL/L (ref 22–29)
CREAT SERPL-MCNC: 0.7 MG/DL (ref 0.5–1.2)
EKG P AXIS: 57 DEGREES
EKG P-R INTERVAL: 140 MS
EKG Q-T INTERVAL: 434 MS
EKG QRS DURATION: 146 MS
EKG QTC CALCULATION (BAZETT): 451 MS
EKG T AXIS: 34 DEGREES
EOSINOPHILS ABSOLUTE: 0.3 K/UL (ref 0–0.6)
EOSINOPHILS RELATIVE PERCENT: 4.1 % (ref 0–5)
GFR NON-AFRICAN AMERICAN: >60
GLUCOSE BLD-MCNC: 101 MG/DL (ref 74–109)
GLUCOSE BLD-MCNC: 72 MG/DL (ref 70–99)
GLUCOSE BLD-MCNC: 89 MG/DL (ref 70–99)
GLUCOSE BLD-MCNC: 90 MG/DL (ref 70–99)
GLUCOSE BLD-MCNC: 92 MG/DL (ref 70–99)
HCO3 ARTERIAL: 30.5 MMOL/L (ref 22–26)
HCT VFR BLD CALC: 38.2 % (ref 42–52)
HEMOGLOBIN, ART, EXTENDED: 12.1 G/DL (ref 14–18)
HEMOGLOBIN: 11.9 G/DL (ref 14–18)
IMMATURE GRANULOCYTES #: 0.4 K/UL
INR BLD: 0.93 (ref 0.88–1.18)
LYMPHOCYTES ABSOLUTE: 2.7 K/UL (ref 1.1–4.5)
LYMPHOCYTES RELATIVE PERCENT: 32.3 % (ref 20–40)
MAGNESIUM: 2.2 MG/DL (ref 1.6–2.4)
MCH RBC QN AUTO: 28.7 PG (ref 27–31)
MCHC RBC AUTO-ENTMCNC: 31.2 G/DL (ref 33–37)
MCV RBC AUTO: 92 FL (ref 80–94)
METHEMOGLOBIN ARTERIAL: 0.8 %
MONOCYTES ABSOLUTE: 0.5 K/UL (ref 0–0.9)
MONOCYTES RELATIVE PERCENT: 5.4 % (ref 0–10)
NEUTROPHILS ABSOLUTE: 4.4 K/UL (ref 1.5–7.5)
NEUTROPHILS RELATIVE PERCENT: 52.7 % (ref 50–65)
O2 CONTENT ARTERIAL: 15.8 ML/DL
O2 SAT, ARTERIAL: 92.7 %
O2 THERAPY: ABNORMAL
PCO2 ARTERIAL: 46 MMHG (ref 35–45)
PDW BLD-RTO: 17 % (ref 11.5–14.5)
PERFORMED ON: NORMAL
PH ARTERIAL: 7.43 (ref 7.35–7.45)
PLATELET # BLD: 169 K/UL (ref 130–400)
PMV BLD AUTO: 10.1 FL (ref 9.4–12.4)
PO2 ARTERIAL: 61 MMHG (ref 80–100)
POTASSIUM SERPL-SCNC: 4 MMOL/L (ref 3.5–5)
POTASSIUM, WHOLE BLOOD: 3.2
PROTHROMBIN TIME: 12.3 SEC (ref 12–14.6)
RBC # BLD: 4.15 M/UL (ref 4.7–6.1)
SODIUM BLD-SCNC: 142 MMOL/L (ref 136–145)
TOTAL PROTEIN: 5.5 G/DL (ref 6.6–8.7)
WBC # BLD: 8.3 K/UL (ref 4.8–10.8)

## 2020-03-17 PROCEDURE — 2700000000 HC OXYGEN THERAPY PER DAY

## 2020-03-17 PROCEDURE — 76937 US GUIDE VASCULAR ACCESS: CPT

## 2020-03-17 PROCEDURE — 82947 ASSAY GLUCOSE BLOOD QUANT: CPT

## 2020-03-17 PROCEDURE — 99231 SBSQ HOSP IP/OBS SF/LOW 25: CPT | Performed by: NURSE PRACTITIONER

## 2020-03-17 PROCEDURE — 2100000000 HC CCU R&B

## 2020-03-17 PROCEDURE — 94003 VENT MGMT INPAT SUBQ DAY: CPT

## 2020-03-17 PROCEDURE — 6370000000 HC RX 637 (ALT 250 FOR IP): Performed by: INTERNAL MEDICINE

## 2020-03-17 PROCEDURE — 6360000002 HC RX W HCPCS: Performed by: INTERNAL MEDICINE

## 2020-03-17 PROCEDURE — 85730 THROMBOPLASTIN TIME PARTIAL: CPT

## 2020-03-17 PROCEDURE — 36600 WITHDRAWAL OF ARTERIAL BLOOD: CPT

## 2020-03-17 PROCEDURE — 93010 ELECTROCARDIOGRAM REPORT: CPT | Performed by: INTERNAL MEDICINE

## 2020-03-17 PROCEDURE — 93005 ELECTROCARDIOGRAM TRACING: CPT | Performed by: INTERNAL MEDICINE

## 2020-03-17 PROCEDURE — C1751 CATH, INF, PER/CENT/MIDLINE: HCPCS

## 2020-03-17 PROCEDURE — 85025 COMPLETE CBC W/AUTO DIFF WBC: CPT

## 2020-03-17 PROCEDURE — 94640 AIRWAY INHALATION TREATMENT: CPT

## 2020-03-17 PROCEDURE — 82803 BLOOD GASES ANY COMBINATION: CPT

## 2020-03-17 PROCEDURE — 85610 PROTHROMBIN TIME: CPT

## 2020-03-17 PROCEDURE — 2580000003 HC RX 258: Performed by: INTERNAL MEDICINE

## 2020-03-17 PROCEDURE — 6360000002 HC RX W HCPCS: Performed by: HOSPITALIST

## 2020-03-17 PROCEDURE — 36415 COLL VENOUS BLD VENIPUNCTURE: CPT

## 2020-03-17 PROCEDURE — 80053 COMPREHEN METABOLIC PANEL: CPT

## 2020-03-17 PROCEDURE — 83735 ASSAY OF MAGNESIUM: CPT

## 2020-03-17 PROCEDURE — 99232 SBSQ HOSP IP/OBS MODERATE 35: CPT | Performed by: INTERNAL MEDICINE

## 2020-03-17 PROCEDURE — 36569 INSJ PICC 5 YR+ W/O IMAGING: CPT

## 2020-03-17 PROCEDURE — C9113 INJ PANTOPRAZOLE SODIUM, VIA: HCPCS | Performed by: INTERNAL MEDICINE

## 2020-03-17 PROCEDURE — 84132 ASSAY OF SERUM POTASSIUM: CPT

## 2020-03-17 PROCEDURE — 71045 X-RAY EXAM CHEST 1 VIEW: CPT

## 2020-03-17 PROCEDURE — 02HV33Z INSERTION OF INFUSION DEVICE INTO SUPERIOR VENA CAVA, PERCUTANEOUS APPROACH: ICD-10-PCS | Performed by: INTERNAL MEDICINE

## 2020-03-17 PROCEDURE — 99233 SBSQ HOSP IP/OBS HIGH 50: CPT | Performed by: INTERNAL MEDICINE

## 2020-03-17 RX ADMIN — IPRATROPIUM BROMIDE AND ALBUTEROL SULFATE 1 AMPULE: .5; 3 SOLUTION RESPIRATORY (INHALATION) at 10:19

## 2020-03-17 RX ADMIN — METOPROLOL TARTRATE 12.5 MG: 25 TABLET, FILM COATED ORAL at 20:24

## 2020-03-17 RX ADMIN — PROPOFOL 45 MCG/KG/MIN: 10 INJECTION, EMULSION INTRAVENOUS at 05:56

## 2020-03-17 RX ADMIN — IPRATROPIUM BROMIDE AND ALBUTEROL SULFATE 1 AMPULE: .5; 3 SOLUTION RESPIRATORY (INHALATION) at 18:19

## 2020-03-17 RX ADMIN — PANTOPRAZOLE SODIUM 40 MG: 40 INJECTION, POWDER, FOR SOLUTION INTRAVENOUS at 09:22

## 2020-03-17 RX ADMIN — IPRATROPIUM BROMIDE AND ALBUTEROL SULFATE 1 AMPULE: .5; 3 SOLUTION RESPIRATORY (INHALATION) at 14:02

## 2020-03-17 RX ADMIN — PIPERACILLIN SODIUM AND TAZOBACTAM SODIUM 3.38 G: 3; .375 INJECTION, POWDER, LYOPHILIZED, FOR SOLUTION INTRAVENOUS at 13:20

## 2020-03-17 RX ADMIN — IPRATROPIUM BROMIDE AND ALBUTEROL SULFATE 1 AMPULE: .5; 3 SOLUTION RESPIRATORY (INHALATION) at 06:12

## 2020-03-17 RX ADMIN — PROPOFOL 45 MCG/KG/MIN: 10 INJECTION, EMULSION INTRAVENOUS at 15:36

## 2020-03-17 RX ADMIN — SODIUM CHLORIDE, PRESERVATIVE FREE 10 ML: 5 INJECTION INTRAVENOUS at 09:24

## 2020-03-17 RX ADMIN — ATORVASTATIN CALCIUM 40 MG: 40 TABLET, FILM COATED ORAL at 20:24

## 2020-03-17 RX ADMIN — PROPOFOL 45 MCG/KG/MIN: 10 INJECTION, EMULSION INTRAVENOUS at 08:48

## 2020-03-17 RX ADMIN — SODIUM CHLORIDE, PRESERVATIVE FREE 10 ML: 5 INJECTION INTRAVENOUS at 20:25

## 2020-03-17 RX ADMIN — ASPIRIN 81 MG 81 MG: 81 TABLET ORAL at 09:22

## 2020-03-17 RX ADMIN — PROPOFOL 45 MCG/KG/MIN: 10 INJECTION, EMULSION INTRAVENOUS at 12:24

## 2020-03-17 RX ADMIN — PROPOFOL 40 MCG/KG/MIN: 10 INJECTION, EMULSION INTRAVENOUS at 23:32

## 2020-03-17 RX ADMIN — PIPERACILLIN SODIUM AND TAZOBACTAM SODIUM 3.38 G: 3; .375 INJECTION, POWDER, LYOPHILIZED, FOR SOLUTION INTRAVENOUS at 20:25

## 2020-03-17 RX ADMIN — PIPERACILLIN SODIUM AND TAZOBACTAM SODIUM 3.38 G: 3; .375 INJECTION, POWDER, LYOPHILIZED, FOR SOLUTION INTRAVENOUS at 05:01

## 2020-03-17 RX ADMIN — HEPARIN SODIUM 9 UNITS/KG/HR: 10000 INJECTION, SOLUTION INTRAVENOUS at 03:34

## 2020-03-17 RX ADMIN — METOPROLOL TARTRATE 12.5 MG: 25 TABLET, FILM COATED ORAL at 09:22

## 2020-03-17 ASSESSMENT — PULMONARY FUNCTION TESTS
PIF_VALUE: 40
PIF_VALUE: 44
PIF_VALUE: 42
PIF_VALUE: 33
PIF_VALUE: 38
PIF_VALUE: 33
PIF_VALUE: 38
PIF_VALUE: 28
PIF_VALUE: 39
PIF_VALUE: 41
PIF_VALUE: 40
PIF_VALUE: 38
PIF_VALUE: 38
PIF_VALUE: 34
PIF_VALUE: 43
PIF_VALUE: 36
PIF_VALUE: 38
PIF_VALUE: 42
PIF_VALUE: 34
PIF_VALUE: 65
PIF_VALUE: 44
PIF_VALUE: 32
PIF_VALUE: 38
PIF_VALUE: 36
PIF_VALUE: 44
PIF_VALUE: 46
PIF_VALUE: 33
PIF_VALUE: 41
PIF_VALUE: 34
PIF_VALUE: 32
PIF_VALUE: 36
PIF_VALUE: 35
PIF_VALUE: 39
PIF_VALUE: 40
PIF_VALUE: 39
PIF_VALUE: 35
PIF_VALUE: 30
PIF_VALUE: 35
PIF_VALUE: 34
PIF_VALUE: 33
PIF_VALUE: 35
PIF_VALUE: 37
PIF_VALUE: 33
PIF_VALUE: 34
PIF_VALUE: 42
PIF_VALUE: 38
PIF_VALUE: 43
PIF_VALUE: 37
PIF_VALUE: 43
PIF_VALUE: 37
PIF_VALUE: 38
PIF_VALUE: 32
PIF_VALUE: 33
PIF_VALUE: 33
PIF_VALUE: 38
PIF_VALUE: 39
PIF_VALUE: 31
PIF_VALUE: 37
PIF_VALUE: 36

## 2020-03-17 ASSESSMENT — PAIN SCALES - GENERAL
PAINLEVEL_OUTOF10: 0

## 2020-03-17 NOTE — PROGRESS NOTES
Dr. Gregg Roles has been to see patient. Reported that he will review cath films for possible re-cath. Awaiting further instructions. Updated patient's wife .

## 2020-03-17 NOTE — PROGRESS NOTES
Dr Nimesh Raines rounded. Not going to cath pt today. I have notified pt's wife. Will keep her updated. I have notified Dr Misha Gomez office, message left. No answer on phone. If no return call from Dr Misha Gomez office , will attempt to renotify of no cath today.  Electronically signed by Loraine eFrrer RN on 3/17/2020 at 10:19 AM

## 2020-03-17 NOTE — PROGRESS NOTES
Pt is on a ventilator and sedated, according to his nurse, Pato Davenport. This  went in pt's room to provide spiritual care. Offered a prayer and provided a sustaining presence for pt but pt is currently unable to respond.      Electronically signed by Judye Duverney on 3/17/2020 at 2:32 PM

## 2020-03-17 NOTE — PROGRESS NOTES
PICC line now placed and ready to use.  Electronically signed by Ayush Carrizales RN on 3/17/2020 at 10:21 AM

## 2020-03-17 NOTE — PROGRESS NOTES
15 g Oral PRN Marques Parrish MD        dextrose 50 % IV solution  12.5 g Intravenous PRN Marques Parrish MD        glucagon (rDNA) injection 1 mg  1 mg Intramuscular PRN Marques Parrish MD        dextrose 5 % solution  100 mL/hr Intravenous PRN Marques Parrish MD        ipratropium-albuterol (DUONEB) nebulizer solution 1 ampule  1 ampule Inhalation Q4H WA Marques Parrish MD   1 ampule at 03/17/20 1019    piperacillin-tazobactam (ZOSYN) 3.375 g in dextrose 5 % 50 mL IVPB extended infusion (mini-bag)  3.375 g Intravenous Anthony Givens MD   Stopped at 03/17/20 0925    heparin (porcine) injection 4,000 Units  4,000 Units Intravenous PRN Marques Parrish MD   4,000 Units at 03/15/20 0618    heparin (porcine) injection 2,000 Units  2,000 Units Intravenous PRN Marques Parrish MD        heparin 25,000 units in dextrose 5% 250 mL infusion  10 Units/kg/hr Intravenous Continuous Marques Parrish MD 9.6 mL/hr at 03/17/20 0334 9 Units/kg/hr at 03/17/20 0334    propofol injection  10 mcg/kg/min Intravenous Titrated Richard Westfall MD 28.9 mL/hr at 03/17/20 0848 45 mcg/kg/min at 03/17/20 0848        Labs:     Recent Labs     03/15/20  1246 03/16/20  0209 03/17/20  0403   WBC 9.2 7.1 8.3   RBC 4.23* 4.05* 4.15*   HGB 12.0* 11.5* 11.9*   HCT 37.6* 39.2* 38.2*   MCV 88.9 96.8* 92.0   MCH 28.4 28.4 28.7   MCHC 31.9* 29.3* 31.2*    140 169     Recent Labs     03/15/20  0403 03/16/20  0209 03/16/20  0419 03/17/20  0403 03/17/20  0417    141  --  142  --    K 4.5 4.5 4.2 4.0 3.2   ANIONGAP 13 10  --  13  --     107  --  104  --    CO2 22 24  --  25  --    BUN 30* 32*  --  36*  --    CREATININE 0.8 0.7  --  0.7  --    GLUCOSE 116* 137*  --  101  --    CALCIUM 8.3* 8.0*  --  8.2*  --      Recent Labs     03/15/20  0403 03/16/20  0209 03/17/20  0403   MG 3.0* 2.6* 2.2     Recent Labs     03/15/20  0403 03/16/20  0209 03/17/20  0403   AST 15 20 35   ALT 15 20 32   BILITOT 0.4 0.3 0.4   ALKPHOS 51 47 49 Problems:    * No resolved hospital problems. *        Visit Summary: I saw the patient at the bedside with no family present. Patient remains about the same. Nursing reports that they may try to wean sedation in attempts to undergo SBT. Cardiac cath being held until patient stable for procedure. Nursing staff has spoken with the spouse this morning and provided all updates. I will continue to follow along to discuss goals of care, which will be dependent on his ability to liberate from vent and undergo procedures. Palliative medicine will continue to follow. Recommendations:  Continue to discuss goals dependent on his progress throughout hospitalization, if unable to liberate from vent would recommend hospice care    Thank you for consulting Palliative Care and allowing us to participate in the care of this patient.        Electronically signed by CHALO Harris on 3/17/2020 at 10:48 AM    (Please note that portions of this note were completed with a voice recognition program.  Jeannine Castro made to edit the dictations but occasionally words are mis-transcribed.)

## 2020-03-17 NOTE — PROCEDURES
PICC Line Insertion Procedure Note    Procedure: Insertion of #5 FR/18G PICC- Dual lumen    Indications:  Home IV therapy    Procedure Details   Informed consent was obtained for the procedure, including local anesthetic. Risks and benefits were discussed. #5 FR/18G PICC inserted to the R Basilic vein per hospital protocol. Blood return:  yes    Findings:  Catheter inserted to 40 cm, with 0 cm exposed. Catheter was flushed with 20 cc NS. Patient did tolerate procedure well. Recommendations:  Tip location confirmed within the SVC by 3CG technology. Okay to use. PICC Brochure given to patient with teaching instruction.

## 2020-03-17 NOTE — PROGRESS NOTES
Dr Waddell Butt rounding. Updated him on no cath today. May attempt wean trial. Propofol decreased to 45mcg/kg/min. Will monitor for aggitation and keep pt calm, wean as tolerated.  Electronically signed by Manolo Pereira RN on 3/17/2020 at 12:17 PM

## 2020-03-17 NOTE — PROGRESS NOTES
Pulmonary and Critical Care Progress Note Kristi Hoffman   MR# [id number]  Acct# [de-identified]  3/17/2020   9:56 AM CDT   Referring Provider: Jass Nova MD  Chief Complaint: Mechanically ventilated    HPI: Patient remains sedated and mechanically ventilated. Remains on a heparin and propofol drip. SCDs in place. Packing has been removed from the nare without any bleeding. Tube feeds on hold in the event that he requires additional cardiac intervention today. Still awaiting decision making. He is actively breathing over the ventilator. Saturation 93% on a PEEP of 5 and FiO2 0.30. Asher and rectal tube remain in place. Still with loose stools although not significant amounts. No other issues. No family present. Past Medical History  Past Medical History:   Diagnosis Date    CAD (coronary artery disease)     Chest pain 11/14/2018    [facility]     COPD [facility]chronic obstructive pulmonary disease) [facility]     Hyperlipidemia     Hypertension     Palliative care patient 03/12/2020    Pneumonia      Surgical History  Past Surgical History:   Procedure Laterality Date    APPENDECTOMY      BACK SURGERY      COLONOSCOPY      ENDOSCOPY, COLON, DIAGNOSTIC       Allergies  No Known Allergies  Medications    lidocaine 1 % injection, 5 mL, Intradermal, Once    sodium chloride flush, 10 mL, Intravenous, 2 times per day    metoprolol tartrate, 12.5 mg, Oral, BID    pantoprazole, 40 mg, Intravenous, Daily    insulin lispro, 0-6 Units, Subcutaneous, TID WC    insulin lispro, 0-3 Units, Subcutaneous, Nightly    aspirin, 81 mg, Oral, Daily    atorvastatin, 40 mg, Oral, Nightly    ipratropium-albuterol, 1 ampule, Inhalation, Q4H WA    piperacillin-tazobactam, 3.375 g, Intravenous, Q8H   Social History   reports that he quit smoking about 21 months ago. His smoking use included cigarettes. He has a 50.00 pack-year smoking history.  His smokeless General: Skin is warm and dry. Neurological:      Mental Status: He is lethargic. Comments: Intubated and sedated, moving all extremities, does not follow commands       Recent laboratory:  Recent Labs     03/15/20  1246 03/16/20  0209 03/17/20  0403   WBC 9.2 7.1 8.3   RBC 4.23* 4.05* 4.15*   HGB 12.0* 11.5* 11.9*   HCT 37.6* 39.2* 38.2*    140 169   MCV 88.9 96.8* 92.0   MCH 28.4 28.4 28.7   MCHC 31.9* 29.3* 31.2*   RDW 16.9* 17.0* 17.0*      Recent Labs     03/15/20  0403 03/16/20  0209 03/16/20  0419 03/17/20  0403 03/17/20  0417    141  --  142  --    K 4.5 4.5 4.2 4.0 3.2    107  --  104  --    CO2 22 24  --  25  --    BUN 30* 32*  --  36*  --    CREATININE 0.8 0.7  --  0.7  --    CALCIUM 8.3* 8.0*  --  8.2*  --    GLUCOSE 116* 137*  --  101  --       Recent Labs     03/15/20  0340 03/16/20  0419 03/17/20  0417   PHART 7.400 7.420 7.430   YXA3ZLK 47.0* 48.0* 46.0*   PO2ART 75.0* 84.0 61.0*   CXV6WTY 29.1* 31.1* 30.5*   F9DOONHJ 94.5 95.0 92.7   BEART 3.6* 5.6* 5.4*     Recent Labs     03/17/20  0403   AST 35   ALT 32   ALKPHOS 49   BILITOT 0.4   MG 2.2   CALCIUM 8.2*   INR 0.93     No results for input(s): BC, LABGRAM, CULTRESP, BFCX in the last 72 hours. Radiograph:  Frontal supine radiograph of the chest 3/17/2020 5:00 AM   History: Intubation   Comparison: Chest x-ray dated 3/16/2020    Findings:    Lines and tubes are stable in position.  No new opacities or   pneumothoraces are visualized in the chest. The cardiomediastinal   silhouette and pulmonary vascularity are unchanged.     No acute osseous or soft tissue abnormality is noted.        Impression   Impression:    1.   No significant interval change since previous exam.   Signed by Dr Sarina Ramirez on 3/17/2020 7:27 AM     My radiograph interpretation/independent review of imaging: Endotracheal tube in good position, lung fields clear, chronic COPD changes, nothing acute      Problem list as generated by [facility]:  Patient Active Problem List   Diagnosis    CAD (coronary artery disease)    Acute ST elevation myocardial infarction (STEMI) involving left anterior descending (LAD) coronary artery (HCC)    Electronic cigarette use    [facility] [address]    Chest pain    Syncope and collapse    COPD exacerbation [facility] [address]    Hypertension    NSTEMI (non-ST elevated myocardial infarction) ([facility] [address]    Palliative care patient     Pulmonary Assessment:    1. Acute on chronic hypoxic respiratory failure still requiring vent stable but still unable to proceed with spontaneous breathing trial yet  2. Nstemi,  leading to above with history of coronary artery disease  3. Severe, stage III COPD with coexisting restrictive disease and severely reduced diffusion capacity. On BD  4. Congestive heart failure appears improved  5. Mild metabolic alkalosis       Recommend/plan:     · No adjustments made to the ventilator. We will begin spontaneous breathing trials when he is awake enough to participate and after final decision making about additional cardiac interventions  · Chest x-ray ABG daily while on the ventilator  · Stress ulcer and DVT prophylaxis  · Bronchodilators  · Monitor off steroids  · Resume nutrition when feasible to do so    Electronically signed by Rashaad Brown on 3/17/2020 at 9:22 AM    Physician Substantive portion:  Sedated on vent. Chest has diminished breath sounds ett in place. Plan assess for awakening tomorrow and spontaneous breathing trials as tolerated. Monitor for signs of myocardial ischemia with spontaneous breathing trial.     I have seen and examined patient personally, performing a face-to-face diagnostic evaluation with plan of care reviewed and developed with APRN and nursing staff. I have addended and/or modified the above history of present illness, physical examination, and assessment and plan to reflect my findings and impressions.  Essential elements of the care plan were discussed with APRN above. Agree with findings and assessment/plan as documented above.     Electronically signed by Sung Chiu, on 3/17/2020, 10:05 PM

## 2020-03-18 ENCOUNTER — APPOINTMENT (OUTPATIENT)
Dept: GENERAL RADIOLOGY | Age: 70
DRG: 207 | End: 2020-03-18
Attending: INTERNAL MEDICINE
Payer: MEDICARE

## 2020-03-18 ENCOUNTER — OUTSIDE FACILITY SERVICE (OUTPATIENT)
Dept: PULMONOLOGY | Facility: CLINIC | Age: 70
End: 2020-03-18

## 2020-03-18 LAB
ALBUMIN SERPL-MCNC: 2.8 G/DL (ref 3.5–5.2)
ALP BLD-CCNC: 51 U/L (ref 40–130)
ALT SERPL-CCNC: 31 U/L (ref 5–41)
ANION GAP SERPL CALCULATED.3IONS-SCNC: 14 MMOL/L (ref 7–19)
APTT: 58.9 SEC (ref 26–36.2)
APTT: 68.3 SEC (ref 26–36.2)
APTT: 84.4 SEC (ref 26–36.2)
AST SERPL-CCNC: 25 U/L (ref 5–40)
BASE EXCESS ARTERIAL: 5 MMOL/L (ref -2–2)
BASOPHILS ABSOLUTE: 0 K/UL (ref 0–0.2)
BASOPHILS RELATIVE PERCENT: 0.5 % (ref 0–1)
BILIRUB SERPL-MCNC: 0.5 MG/DL (ref 0.2–1.2)
BUN BLDV-MCNC: 26 MG/DL (ref 8–23)
CALCIUM SERPL-MCNC: 8.1 MG/DL (ref 8.8–10.2)
CARBOXYHEMOGLOBIN ARTERIAL: 2.3 % (ref 0–5)
CHLORIDE BLD-SCNC: 102 MMOL/L (ref 98–111)
CO2: 26 MMOL/L (ref 22–29)
CREAT SERPL-MCNC: 0.7 MG/DL (ref 0.5–1.2)
EOSINOPHILS ABSOLUTE: 0.4 K/UL (ref 0–0.6)
EOSINOPHILS RELATIVE PERCENT: 5.3 % (ref 0–5)
GFR NON-AFRICAN AMERICAN: >60
GLUCOSE BLD-MCNC: 103 MG/DL (ref 70–99)
GLUCOSE BLD-MCNC: 72 MG/DL (ref 70–99)
GLUCOSE BLD-MCNC: 87 MG/DL (ref 70–99)
GLUCOSE BLD-MCNC: 89 MG/DL (ref 74–109)
HCO3 ARTERIAL: 29.1 MMOL/L (ref 22–26)
HCT VFR BLD CALC: 34.6 % (ref 42–52)
HEMOGLOBIN, ART, EXTENDED: 11.6 G/DL (ref 14–18)
HEMOGLOBIN: 10.9 G/DL (ref 14–18)
IMMATURE GRANULOCYTES #: 0.5 K/UL
INR BLD: 1.03 (ref 0.88–1.18)
LYMPHOCYTES ABSOLUTE: 2 K/UL (ref 1.1–4.5)
LYMPHOCYTES RELATIVE PERCENT: 25.6 % (ref 20–40)
MAGNESIUM: 2.3 MG/DL (ref 1.6–2.4)
MCH RBC QN AUTO: 28.5 PG (ref 27–31)
MCHC RBC AUTO-ENTMCNC: 31.5 G/DL (ref 33–37)
MCV RBC AUTO: 90.3 FL (ref 80–94)
METHEMOGLOBIN ARTERIAL: 1.1 %
MONOCYTES ABSOLUTE: 0.4 K/UL (ref 0–0.9)
MONOCYTES RELATIVE PERCENT: 4.8 % (ref 0–10)
NEUTROPHILS ABSOLUTE: 4.6 K/UL (ref 1.5–7.5)
NEUTROPHILS RELATIVE PERCENT: 58 % (ref 50–65)
O2 CONTENT ARTERIAL: 15.5 ML/DL
O2 SAT, ARTERIAL: 94.7 %
O2 THERAPY: ABNORMAL
PCO2 ARTERIAL: 40 MMHG (ref 35–45)
PDW BLD-RTO: 17 % (ref 11.5–14.5)
PERFORMED ON: ABNORMAL
PERFORMED ON: NORMAL
PERFORMED ON: NORMAL
PH ARTERIAL: 7.47 (ref 7.35–7.45)
PLATELET # BLD: 164 K/UL (ref 130–400)
PMV BLD AUTO: 10 FL (ref 9.4–12.4)
PO2 ARTERIAL: 77 MMHG (ref 80–100)
POTASSIUM SERPL-SCNC: 3.1 MMOL/L (ref 3.5–5)
POTASSIUM SERPL-SCNC: 3.5 MMOL/L (ref 3.5–5)
POTASSIUM, WHOLE BLOOD: 3.1
PROTHROMBIN TIME: 13.4 SEC (ref 12–14.6)
RBC # BLD: 3.83 M/UL (ref 4.7–6.1)
SODIUM BLD-SCNC: 142 MMOL/L (ref 136–145)
TOTAL PROTEIN: 5.8 G/DL (ref 6.6–8.7)
WBC # BLD: 8 K/UL (ref 4.8–10.8)

## 2020-03-18 PROCEDURE — 36600 WITHDRAWAL OF ARTERIAL BLOOD: CPT

## 2020-03-18 PROCEDURE — 84132 ASSAY OF SERUM POTASSIUM: CPT

## 2020-03-18 PROCEDURE — 6370000000 HC RX 637 (ALT 250 FOR IP): Performed by: INTERNAL MEDICINE

## 2020-03-18 PROCEDURE — 80053 COMPREHEN METABOLIC PANEL: CPT

## 2020-03-18 PROCEDURE — 82947 ASSAY GLUCOSE BLOOD QUANT: CPT

## 2020-03-18 PROCEDURE — C9113 INJ PANTOPRAZOLE SODIUM, VIA: HCPCS | Performed by: INTERNAL MEDICINE

## 2020-03-18 PROCEDURE — 85730 THROMBOPLASTIN TIME PARTIAL: CPT

## 2020-03-18 PROCEDURE — 2700000000 HC OXYGEN THERAPY PER DAY

## 2020-03-18 PROCEDURE — 85025 COMPLETE CBC W/AUTO DIFF WBC: CPT

## 2020-03-18 PROCEDURE — 6360000002 HC RX W HCPCS: Performed by: HOSPITALIST

## 2020-03-18 PROCEDURE — 99233 SBSQ HOSP IP/OBS HIGH 50: CPT | Performed by: INTERNAL MEDICINE

## 2020-03-18 PROCEDURE — 6360000002 HC RX W HCPCS: Performed by: INTERNAL MEDICINE

## 2020-03-18 PROCEDURE — 94640 AIRWAY INHALATION TREATMENT: CPT

## 2020-03-18 PROCEDURE — 85610 PROTHROMBIN TIME: CPT

## 2020-03-18 PROCEDURE — 82803 BLOOD GASES ANY COMBINATION: CPT

## 2020-03-18 PROCEDURE — 94003 VENT MGMT INPAT SUBQ DAY: CPT

## 2020-03-18 PROCEDURE — 2580000003 HC RX 258: Performed by: INTERNAL MEDICINE

## 2020-03-18 PROCEDURE — 83735 ASSAY OF MAGNESIUM: CPT

## 2020-03-18 PROCEDURE — 71045 X-RAY EXAM CHEST 1 VIEW: CPT

## 2020-03-18 PROCEDURE — 2100000000 HC CCU R&B

## 2020-03-18 RX ORDER — POTASSIUM CHLORIDE 29.8 MG/ML
20 INJECTION INTRAVENOUS PRN
Status: DISCONTINUED | OUTPATIENT
Start: 2020-03-18 | End: 2020-03-27 | Stop reason: HOSPADM

## 2020-03-18 RX ADMIN — METOPROLOL TARTRATE 12.5 MG: 25 TABLET, FILM COATED ORAL at 09:14

## 2020-03-18 RX ADMIN — PIPERACILLIN SODIUM AND TAZOBACTAM SODIUM 3.38 G: 3; .375 INJECTION, POWDER, LYOPHILIZED, FOR SOLUTION INTRAVENOUS at 20:51

## 2020-03-18 RX ADMIN — IPRATROPIUM BROMIDE AND ALBUTEROL SULFATE 1 AMPULE: .5; 3 SOLUTION RESPIRATORY (INHALATION) at 10:06

## 2020-03-18 RX ADMIN — PROPOFOL 45 MCG/KG/MIN: 10 INJECTION, EMULSION INTRAVENOUS at 02:27

## 2020-03-18 RX ADMIN — POTASSIUM CHLORIDE 20 MEQ: 29.8 INJECTION, SOLUTION INTRAVENOUS at 09:14

## 2020-03-18 RX ADMIN — POTASSIUM CHLORIDE 20 MEQ: 29.8 INJECTION, SOLUTION INTRAVENOUS at 10:58

## 2020-03-18 RX ADMIN — PIPERACILLIN SODIUM AND TAZOBACTAM SODIUM 3.38 G: 3; .375 INJECTION, POWDER, LYOPHILIZED, FOR SOLUTION INTRAVENOUS at 05:54

## 2020-03-18 RX ADMIN — IPRATROPIUM BROMIDE AND ALBUTEROL SULFATE 1 AMPULE: .5; 3 SOLUTION RESPIRATORY (INHALATION) at 14:21

## 2020-03-18 RX ADMIN — IPRATROPIUM BROMIDE AND ALBUTEROL SULFATE 1 AMPULE: .5; 3 SOLUTION RESPIRATORY (INHALATION) at 18:17

## 2020-03-18 RX ADMIN — PROPOFOL 35 MCG/KG/MIN: 10 INJECTION, EMULSION INTRAVENOUS at 15:28

## 2020-03-18 RX ADMIN — SODIUM CHLORIDE, PRESERVATIVE FREE 10 ML: 5 INJECTION INTRAVENOUS at 20:51

## 2020-03-18 RX ADMIN — HEPARIN SODIUM 7 UNITS/KG/HR: 10000 INJECTION, SOLUTION INTRAVENOUS at 10:17

## 2020-03-18 RX ADMIN — SODIUM CHLORIDE, PRESERVATIVE FREE 10 ML: 5 INJECTION INTRAVENOUS at 09:17

## 2020-03-18 RX ADMIN — ATORVASTATIN CALCIUM 40 MG: 40 TABLET, FILM COATED ORAL at 20:51

## 2020-03-18 RX ADMIN — PROPOFOL 40 MCG/KG/MIN: 10 INJECTION, EMULSION INTRAVENOUS at 05:54

## 2020-03-18 RX ADMIN — PROPOFOL 35 MCG/KG/MIN: 10 INJECTION, EMULSION INTRAVENOUS at 11:39

## 2020-03-18 RX ADMIN — PROPOFOL 35 MCG/KG/MIN: 10 INJECTION, EMULSION INTRAVENOUS at 19:53

## 2020-03-18 RX ADMIN — IPRATROPIUM BROMIDE AND ALBUTEROL SULFATE 1 AMPULE: .5; 3 SOLUTION RESPIRATORY (INHALATION) at 06:41

## 2020-03-18 RX ADMIN — PANTOPRAZOLE SODIUM 40 MG: 40 INJECTION, POWDER, FOR SOLUTION INTRAVENOUS at 09:13

## 2020-03-18 RX ADMIN — METOPROLOL TARTRATE 12.5 MG: 25 TABLET, FILM COATED ORAL at 20:51

## 2020-03-18 RX ADMIN — ASPIRIN 81 MG 81 MG: 81 TABLET ORAL at 09:14

## 2020-03-18 ASSESSMENT — PULMONARY FUNCTION TESTS
PIF_VALUE: 58
PIF_VALUE: 45
PIF_VALUE: 48
PIF_VALUE: 40
PIF_VALUE: 37
PIF_VALUE: 46
PIF_VALUE: 42
PIF_VALUE: 70
PIF_VALUE: 40
PIF_VALUE: 37
PIF_VALUE: 34
PIF_VALUE: 37
PIF_VALUE: 32
PIF_VALUE: 35
PIF_VALUE: 58
PIF_VALUE: 38
PIF_VALUE: 40
PIF_VALUE: 37
PIF_VALUE: 40
PIF_VALUE: 40
PIF_VALUE: 42
PIF_VALUE: 39
PIF_VALUE: 52
PIF_VALUE: 36
PIF_VALUE: 36
PIF_VALUE: 38
PIF_VALUE: 37
PIF_VALUE: 31
PIF_VALUE: 37
PIF_VALUE: 34
PIF_VALUE: 34
PIF_VALUE: 50
PIF_VALUE: 40
PIF_VALUE: 53
PIF_VALUE: 36
PIF_VALUE: 34
PIF_VALUE: 35
PIF_VALUE: 34
PIF_VALUE: 44
PIF_VALUE: 41
PIF_VALUE: 32

## 2020-03-18 ASSESSMENT — PAIN SCALES - GENERAL
PAINLEVEL_OUTOF10: 0

## 2020-03-18 NOTE — PROGRESS NOTES
While assessing pt and asking pt to squeeze my hands, pt suddenly reached up and grabbed me with both hands. I explained that I had to keep asking this command as I was attempting to assess if respiratory doctor may want to extubate today he needed to be able to follow commands. Pt nodded yes to this. Jered Olmos APRN with pulmonogy here at this time. Pt now following all commands.  Electronically signed by Arsalan Nino RN on 3/18/2020 at 8:54 AM

## 2020-03-18 NOTE — PROGRESS NOTES
Pulmonary and Critical Care Progress Note Jaime Bashir   MR# [id number]  Acct# [de-identified]  3/18/2020   9:56 AM CDT   Referring Provider: Annette Diaz DO  Chief Complaint: Mechanically ventilated    HPI: Patient remains sedated and mechanically ventilated. Remains on a heparin and propofol drip. SCDs in place. Tube feeds on hold as of midnight in the event that he requires additional cardiac intervention today. Still awaiting decision making. He is actively breathing over the ventilator. Saturation 94% on a PEEP of 5 and FiO2 0.30. Asher and rectal tube remain in place. Per nursing, he was on 45 of Propofol all day yesterday, then 40 last night. She turned him down to 30 then stopped. He is following commands and a little agitated. Right wrist restraint in place. Nursing indicates he was slow to awaken. ABGs reviewed and stable. CXR stable. No other issues. No family present.      Past Medical History  Past Medical History:   Diagnosis Date    CAD (coronary artery disease)     Chest pain 11/14/2018    [facility]     COPD [facility]chronic obstructive pulmonary disease) [facility]     Hyperlipidemia     Hypertension     Palliative care patient 03/12/2020    Pneumonia      Surgical History  Past Surgical History:   Procedure Laterality Date    APPENDECTOMY      BACK SURGERY      COLONOSCOPY      ENDOSCOPY, COLON, DIAGNOSTIC       Allergies  No Known Allergies  Medications  lidocaine 1 % injection, 5 mL, Intradermal, Once    sodium chloride flush, 10 mL, Intravenous, 2 times per day    metoprolol tartrate, 12.5 mg, Oral, BID    pantoprazole, 40 mg, Intravenous, Daily    insulin lispro, 0-6 Units, Subcutaneous, TID WC    insulin lispro, 0-3 Units, Subcutaneous, Nightly    aspirin, 81 mg, Oral, Daily    atorvastatin, 40 mg, Oral, Nightly    ipratropium-albuterol, 1 ampule, Inhalation, Q4H WA    piperacillin-tazobactam, 3.375 g, Intravenous, sounds. Decreased breath sounds present. No wheezing or rhonchi. Abdominal:      General: Abdomen is flat. There is no distension. Palpations: Abdomen is soft. Skin:     General: Skin is warm and dry. Neurological:      Mental Status: He is lethargic. Comments: Intubated and sedated, moving all extremities, does not follow commands       Recent laboratory:  Recent Labs     03/16/20 0209 03/17/20 0403 03/18/20 0415   WBC 7.1 8.3 8.0   RBC 4.05* 4.15* 3.83*   HGB 11.5* 11.9* 10.9*   HCT 39.2* 38.2* 34.6*    169 164   MCV 96.8* 92.0 90.3   MCH 28.4 28.7 28.5   MCHC 29.3* 31.2* 31.5*   RDW 17.0* 17.0* 17.0*      Recent Labs     03/16/20 0209 03/17/20 0403 03/17/20 0417 03/18/20 0415 03/18/20 0431     --  142  --  142  --    K 4.5   < > 4.0 3.2 3.1* 3.1     --  104  --  102  --    CO2 24  --  25  --  26  --    BUN 32*  --  36*  --  26*  --    CREATININE 0.7  --  0.7  --  0.7  --    CALCIUM 8.0*  --  8.2*  --  8.1*  --    GLUCOSE 137*  --  101  --  89  --     < > = values in this interval not displayed. Recent Labs     03/16/20 0419 03/17/20 0417 03/18/20 0431   PHART 7.420 7.430 7.470*   QAE8BCY 48.0* 46.0* 40.0   PO2ART 84.0 61.0* 77.0*   JEO6BZQ 31.1* 30.5* 29.1*   J4GCHFKL 95.0 92.7 94.7   BEART 5.6* 5.4* 5.0*     Recent Labs     03/18/20 0415   AST 25   ALT 31   ALKPHOS 51   BILITOT 0.5   MG 2.3   CALCIUM 8.1*   INR 1.03     No results for input(s): BC, LABGRAM, CULTRESP, BFCX in the last 72 hours. Radiograph:  Frontal supine radiograph of the chest 3/17/2020 5:00 AM   History: Intubation   Comparison: Chest x-ray dated 3/16/2020    Findings:    Lines and tubes are stable in position.  No new opacities or   pneumothoraces are visualized in the chest. The cardiomediastinal   silhouette and pulmonary vascularity are unchanged.     No acute osseous or soft tissue abnormality is noted.        Impression   Impression:    1.   No significant interval change since shows mild LLL atelectasis. I have seen and examined patient personally, performing a face-to-face diagnostic evaluation with plan of care reviewed and developed with APRN and nursing staff. I have addended and/or modified the above history of present illness, physical examination, and assessment and plan to reflect my findings and impressions. Essential elements of the care plan were discussed with APRN above. Agree with findings and assessment/plan as documented above.     Electronically signed by Pankaj Strong on 3/18/2020, 7:27 PM

## 2020-03-18 NOTE — PROGRESS NOTES
Pt RASS-3. Will decrease Propofol gtt to 30mcg/kg/min and monitor.  Electronically signed by Mallory Zee RN on 3/18/2020 at 8:12 AM

## 2020-03-18 NOTE — PROGRESS NOTES
Hospitalist Progress Note    Patient:  Colin Haddad  YOB: 1950  Date of Service: 3/18/2020  MRN: [id number]   Acct: [de-identified]   Primary Care Physician: Elian Garcia MD  Advance Directive: [facility]  Admit Date: 3/11/2020       Hospital Day: 7  Referring Provider: Annamarie Patel DO    Patient Seen, Chart, Consults, Notes, Labs, Radiology studies reviewed. Subjective:  Colin Haddad is a 81 y.o. male  whom we are following for hypoxemic respiratory failure, coronary artery disease, COPD, non-ST segment elevation MI. No improvement from the standpoint of weaning. Tentatively scheduled for heart cath sometime this week. He did test positive for RSV. Allergies:  Patient has no known allergies.     Medicines:  Current Facility-Administered Medications   Medication Dose Route Frequency Provider Last Rate Last Dose    potassium chloride 20 mEq/50 mL IVPB (Central Line)  20 mEq Intravenous PRN Annamarie Patel DO 50 mL/hr at 03/18/20 0914 20 mEq at 03/18/20 0914    lidocaine PF 1 % injection 5 mL  5 mL Intradermal Once Krzysztof Sanchez MD        sodium chloride flush 0.9 % injection 10 mL  10 mL Intravenous 2 times per day Krzysztof Sanchez MD   10 mL at 03/18/20 0917    sodium chloride flush 0.9 % injection 10 mL  10 mL Intravenous PRN Krzysztof Sanchez MD        metoprolol tartrate (LOPRESSOR) tablet 12.5 mg  12.5 mg Oral BID Marycruz Louis MD   12.5 mg at 03/18/20 0914    morphine injection 2 mg  2 mg Intravenous Q1H PRN Sadia Richter MD   2 mg at 03/16/20 1221    pantoprazole (PROTONIX) injection 40 mg  40 mg Intravenous Daily Krzysztof Sanchez MD   40 mg at 03/18/20 0913    insulin lispro (HUMALOG) injection vial 0-6 Units  0-6 Units Subcutaneous TID WC Krzysztof Sanchez MD        insulin lispro (HUMALOG) injection vial 0-3 Units  0-3 Units Subcutaneous Nightly Krzysztof Sanchez MD   1 Units at 03/11/20 2211    aspirin chewable tablet 81 mg  81 mg Oral Daily Aalap pressure 132/60, pulse 94, temperature 98.4 °F (36.9 °C), temperature source Temporal, resp. rate (!) 31, height 6' (1.829 m), weight 219 lb 3.2 oz (99.4 kg), SpO2 95 %. Intake/Output Summary (Last 24 hours) at 3/18/2020 1047  Last data filed at 3/18/2020 0600  Gross per 24 hour   Intake 1857.42 ml   Output 1280 ml   Net 577.42 ml       Physical Exam  Vitals signs reviewed. Constitutional:       Appearance: He is well-developed. HENT:      Head: Normocephalic and atraumatic. Eyes:      Conjunctiva/sclera: Conjunctivae normal.      Pupils: Pupils are equal, round, and reactive to light. Cardiovascular:      Rate and Rhythm: Normal rate and regular rhythm. Heart sounds: Normal heart sounds. Pulmonary:      Breath sounds: Wheezing and rhonchi present. Abdominal:      General: Bowel sounds are normal.   Skin:     General: Skin is warm and dry. Labs:  BMP:   Recent Labs     03/16/20 0209 03/17/20 0403 03/17/20 0417 03/18/20 0415 03/18/20  0431     --  142  --  142  --    K 4.5   < > 4.0 3.2 3.1* 3.1     --  104  --  102  --    CO2 24  --  25  --  26  --    BUN 32*  --  36*  --  26*  --    CREATININE 0.7  --  0.7  --  0.7  --    CALCIUM 8.0*  --  8.2*  --  8.1*  --     < > = values in this interval not displayed. CBC:   Recent Labs     03/16/20 0209 03/17/20 0403 03/18/20 0415   WBC 7.1 8.3 8.0   HGB 11.5* 11.9* 10.9*   HCT 39.2* 38.2* 34.6*   MCV 96.8* 92.0 90.3    169 164     LIVER PROFILE:   Recent Labs     03/16/20 0209 03/17/20 0403 03/18/20 0415   AST 20 35 25   ALT 20 32 31   BILITOT 0.3 0.4 0.5   ALKPHOS 47 49 51     PT/INR:   Recent Labs     03/16/20 0209 03/17/20  0403 03/18/20  0415   PROTIME 12.6 12.3 13.4   INR 0.95 0.93 1.03     APTT:   Recent Labs     03/17/20  1550 03/17/20  2218 03/18/20  0415   APTT 79.6* 91.5* 84.4*     BNP:  No results for input(s): BNP in the last 72 hours.   Ionized Calcium:No results for input(s): IONCA in the last 72 hours.  Magnesium:  Recent Labs     03/16/20  0209 03/17/20  0403 03/18/20  0415   MG 2.6* 2.2 2.3     Phosphorus:No results for input(s): PHOS in the last 72 hours. HgbA1C: No results for input(s): LABA1C in the last 72 hours. Hepatic:   Recent Labs     03/16/20  0209 03/17/20  0403 03/18/20  0415   ALKPHOS 47 49 51   ALT 20 32 31   AST 20 35 25   PROT 5.6* 5.5* 5.8*   BILITOT 0.3 0.4 0.5   LABALBU 2.6* 2.9* 2.8*     Lactic Acid: No results for input(s): LACTA in the last 72 hours. Troponin: No results for input(s): CKTOTAL, CKMB, TROPONINT in the last 72 hours. ABGs: No results for input(s): PH, PCO2, PO2, HCO3, O2SAT in the last 72 hours. CRP:  No results for input(s): CRP in the last 72 hours. Sed Rate:  No results for input(s): SEDRATE in the last 72 hours. Cultures:   No results for input(s): CULTURE in the last 72 hours. No results for input(s): BC, Sourav Saint in the last 72 hours. No results for input(s): CXSURG in the last 72 hours. Radiology reports as per the Radiologist  Radiology: Xr Chest Portable    Result Date: 3/12/2020  EXAMINATION: XR CHEST PORTABLE 3/12/2020 7:19 AM HISTORY: XR CHEST PORTABLE 3/12/2020 2:00 AM HISTORY: Intubated COMPARISON: March 11, 2020. FINDINGS: Hyperinflation is present. No infiltrates or consolidation is identified. Cardiac silhouettes normal. Endotracheal tube is present. . The osseous structures and surrounding soft tissues demonstrate no acute abnormality. 1. COPD no acute cardiopulmonary process. Signed by Dr Jolie Mera on 3/12/2020 7:20 AM    Xr Chest Portable    Result Date: 3/11/2020  XR CHEST PORTABLE 3/11/2020 4:45 PM HISTORY:   Respiratory distress  Single view. COMPARISONS:  4/17/2019 chest radiography FINDINGS: Endotracheal tube position above the marcella. NG tube looped in the proximal stomach. The lungs are clear without parenchymal infiltrates. The heart is normal in size, pulmonary circulation appropriate, without heart failure.  The bony structures are intact. 1. Line and support tubes well-positioned. 2. No acute cardiopulmonary process. Signed by Dr Traci Talamantes on 3/11/2020 7:07 PM       Assessment     Non-ST segment elevation MI. Eventual cardiac cath. Exacerbation of COPD. Medical management. Hypoxemic respiratory failure. Continue vent support. RSV pneumonia. Supportive care. He remains in critical condition.       Kristan Petty, DO

## 2020-03-18 NOTE — PROGRESS NOTES
KPU:779    Diastolic (55RSF), WKU:17, Min:54, Max:81        Bob Linda is a 81 y.o. male with the following history as recorded in [facility]:    Patient Active Problem List    Diagnosis Date Noted    Palliative care patient 03/12/2020     Priority: Low    NSTEMI (non-ST elevated myocardial infarction) ([facility] 03/11/2020     Priority: Low    [facility] 04/18/2019     Priority: Low    Hypertension 04/18/2019     Priority: Low    Syncope and collapse 04/17/2019     Priority: Low    Chest pain 11/14/2018     Priority: Low    STEMI (ST elevation myocardial infarction) ([facility] 06/05/2018     Priority: Low    CAD (coronary artery disease) 06/05/2018    Acute ST elevation myocardial infarction (STEMI) involving left anterior descending (LAD) coronary artery (HCC)     Electronic cigarette use      Current Facility-Administered Medications   Medication Dose Route Frequency Provider Last Rate Last Dose    lidocaine PF 1 % injection 5 mL  5 mL Intradermal Once Ena Sidhu MD        sodium chloride flush 0.9 % injection 10 mL  10 mL Intravenous 2 times per day Ena Sidhu MD   10 mL at 03/17/20 0924    sodium chloride flush 0.9 % injection 10 mL  10 mL Intravenous PRN Ena Sidhu MD        metoprolol tartrate (LOPRESSOR) tablet 12.5 mg  12.5 mg Oral BID Albert Hutchins MD   12.5 mg at 03/17/20 9066    morphine injection 2 mg  2 mg Intravenous Q1H PRN Katina Valencia MD   2 mg at 03/16/20 1221    pantoprazole (PROTONIX) injection 40 mg  40 mg Intravenous Daily Ena Sidhu MD   40 mg at 03/17/20 0922    insulin lispro (HUMALOG) injection vial 0-6 Units  0-6 Units Subcutaneous TID WC Ena Sidhu MD        insulin lispro (HUMALOG) injection vial 0-3 Units  0-3 Units Subcutaneous Nightly Ena Sidhu MD   1 Units at 03/11/20 2211    aspirin chewable tablet 81 mg  81 mg Oral Daily Ena Sidhu MD   81 mg at 03/17/20 0922    atorvastatin (LIPITOR) tablet 40 mg  40 mg to quit: 2018     Years since quittin.7    Smokeless tobacco: Current User     Types: Chew   Substance Use Topics    Alcohol use: Not Currently     Frequency: Never     Comment: occassional          Review of Systems:    General:      Complaint / Symptom Yes / No / Description if Yes       Fatigue Yes:  chronic   Weight gain NA   Insomnia NA       Respiratory:        Complaint / Symptom Yes / No / Description if Yes       Cough No   Horseness NA       Cardiovascular:    Complaint / Symptom Yes / No / Description if Yes       Chest Pain N/A   Shortness of Air / Orthopnea Yes: chronic and on vent   Presyncope / Syncope No   Palpitations No         Objective:    /69   Pulse 94   Temp 97.8 °F (36.6 °C) (Temporal)   Resp 17   Ht 6' (1.829 m)   Wt 221 lb 12.8 oz (100.6 kg)   SpO2 95%   BMI 30.08 kg/m² ,     Intake/Output Summary (Last 24 hours) at 3/17/2020 1943  Last data filed at 3/17/2020 1800  Gross per 24 hour   Intake 2446.1 ml   Output 2685 ml   Net -238.9 ml       GENERAL - well developed and well nourished, is not an active participant in this examination  HEENT -  PERRLA, Hearing not tested, conjunctiva and lids are normal, ears and nose appear normal  NECK - no thyromegaly, no JVD, trachea is in the midline  CARDIOVASCULAR - PMI is in the left mid line clavicular position, Normal S1 and S2 with a grade 1/6 systolic murmur. No S3 or S4    PULMONARY - Yes: intubated respiratory distress. scattered wheezes and rales.   Breath sounds in both  lung fields are Decreased  ABDOMEN  - soft, non tender, no rebound, no hepatomegaly or splenomegaly  MUSCULOSKELETAL  - Prone/Supine, digitals and nails are without clubbing or cyanosis  EXTREMITIES - trace edema  NEUROLOGIC - Intubated and sedated and not particularly responsive   SKIN - turgor is normal, no rash  PSYCHIATRIC - Intubated and sedated and not particularly responsive       ASSESSMENT:    ALL THE CARDIOLOGY PROBLEMS ARE LISTED ABOVE; HOWEVER, THE FOLLOWING SPECIFIC CARDIAC PROBLEMS / CONDITIONS WERE ADDRESSED AND TREATED DURING THE OFFICE VISIT TODAY:                                                                                            MEDICAL DECISION MAKING                   Cardiac Specific Problem / Diagnosis   Discussion and Data Reviewed Diagnostic Procedures Ordered Management Options Selected                 1. Presenting problem / symptom     Probable non Q wave MI with resulting acute respiratory failure  show no change    The patient is a possible candidate for a re cath. I reviewed his films today and I did place stents I the LAD at the time of his anterior MI. They were bare metal stents. It is possible that they could have restenosed or his had disease progression in the other diffusely diseased arteries. No Continue current medications:      Yes:                  2. CAD Initial presentation during this evaluation    Review and summation of old records:     2000 stents  6/5/18  Anterior STEMI, AUC indication 2, AUC score 7  6/5/18  Cath 99% mid LAD, 2.5 x 18 and 2.5 x 28 BMS, diffuse CAD, anterior lateral dyskinesis, EF 40%  3/13/2020  Echo  Normal LVFX    No Continue current medications:     Yes:                  3. Concern regarding systemic blood pressure Initial presentation during this evaluation Systolic (08BSN), LMY:216 , Min:111 , TQT:354    Diastolic (21ARO), XKA:75, Min:62, Max:84    No Continue current medications:        yes           PLAN:     1. Continue present medications except for changes as noted above  2. Continue to monitor rhythm  3.  Further orders per clinical course.        Summary of hospital course thus far:        Date Clinical Event Plan of Treatment           03/16/20 Reviewed films from 6/5/2018 Discussion regarding goal of this clinical course   03/17/20    Awaiting weaning trial and decision on clinical course As noted            Discussed with nursing.     Electronically signed by Achille Cushing Jj Celeste MD on 3/17/20  [facility]

## 2020-03-19 ENCOUNTER — APPOINTMENT (OUTPATIENT)
Dept: GENERAL RADIOLOGY | Age: 70
DRG: 207 | End: 2020-03-19
Attending: INTERNAL MEDICINE
Payer: MEDICARE

## 2020-03-19 ENCOUNTER — OUTSIDE FACILITY SERVICE (OUTPATIENT)
Dept: PULMONOLOGY | Facility: CLINIC | Age: 70
End: 2020-03-19

## 2020-03-19 LAB
ADENOVIRUS PCR: NOT DETECTED
ALBUMIN SERPL-MCNC: 2.7 G/DL (ref 3.5–5.2)
ALP BLD-CCNC: 50 U/L (ref 40–130)
ALT SERPL-CCNC: 25 U/L (ref 5–41)
ANION GAP SERPL CALCULATED.3IONS-SCNC: 11 MMOL/L (ref 7–19)
APTT: 59.8 SEC (ref 26–36.2)
APTT: 60.2 SEC (ref 26–36.2)
APTT: 62.2 SEC (ref 26–36.2)
APTT: 64.6 SEC (ref 26–36.2)
AST SERPL-CCNC: 21 U/L (ref 5–40)
BASE EXCESS ARTERIAL: 5.1 MMOL/L (ref -2–2)
BASOPHILS ABSOLUTE: 0.1 K/UL (ref 0–0.2)
BASOPHILS RELATIVE PERCENT: 0.7 % (ref 0–1)
BILIRUB SERPL-MCNC: 0.5 MG/DL (ref 0.2–1.2)
BUN BLDV-MCNC: 20 MG/DL (ref 8–23)
CALCIUM SERPL-MCNC: 8.2 MG/DL (ref 8.8–10.2)
CARBOXYHEMOGLOBIN ARTERIAL: 2.1 % (ref 0–5)
CHLORIDE BLD-SCNC: 104 MMOL/L (ref 98–111)
CO2: 26 MMOL/L (ref 22–29)
CREAT SERPL-MCNC: 0.7 MG/DL (ref 0.5–1.2)
EOSINOPHILS ABSOLUTE: 0.4 K/UL (ref 0–0.6)
EOSINOPHILS RELATIVE PERCENT: 5.5 % (ref 0–5)
GFR NON-AFRICAN AMERICAN: >60
GLUCOSE BLD-MCNC: 102 MG/DL (ref 74–109)
GLUCOSE BLD-MCNC: 104 MG/DL (ref 70–99)
GLUCOSE BLD-MCNC: 109 MG/DL (ref 70–99)
GLUCOSE BLD-MCNC: 110 MG/DL (ref 70–99)
GLUCOSE BLD-MCNC: 117 MG/DL (ref 70–99)
HCO3 ARTERIAL: 29.9 MMOL/L (ref 22–26)
HCT VFR BLD CALC: 32.5 % (ref 42–52)
HEMOGLOBIN, ART, EXTENDED: 10.5 G/DL (ref 14–18)
HEMOGLOBIN: 10.2 G/DL (ref 14–18)
HUMAN METAPNEUMOVIRUS PCR: NOT DETECTED
IMMATURE GRANULOCYTES #: 0.4 K/UL
INFLUENZA A: NOT DETECTED
INFLUENZA B: NOT DETECTED
LYMPHOCYTES ABSOLUTE: 1.8 K/UL (ref 1.1–4.5)
LYMPHOCYTES RELATIVE PERCENT: 23.6 % (ref 20–40)
MAGNESIUM: 2.2 MG/DL (ref 1.6–2.4)
MCH RBC QN AUTO: 28.3 PG (ref 27–31)
MCHC RBC AUTO-ENTMCNC: 31.4 G/DL (ref 33–37)
MCV RBC AUTO: 90.3 FL (ref 80–94)
METHEMOGLOBIN ARTERIAL: 0.8 %
MONOCYTES ABSOLUTE: 0.4 K/UL (ref 0–0.9)
MONOCYTES RELATIVE PERCENT: 5.7 % (ref 0–10)
NEUTROPHILS ABSOLUTE: 4.5 K/UL (ref 1.5–7.5)
NEUTROPHILS RELATIVE PERCENT: 59.2 % (ref 50–65)
O2 CONTENT ARTERIAL: 14.2 ML/DL
O2 SAT, ARTERIAL: 95.2 %
O2 THERAPY: ABNORMAL
PARAINFLUENZA 1 PCR: NOT DETECTED
PARAINFLUENZA 2 PCR: NOT DETECTED
PARAINFLUENZA 3 PCR: NOT DETECTED
PARAINFLUENZA 4 PCR: NOT DETECTED
PCO2 ARTERIAL: 44 MMHG (ref 35–45)
PDW BLD-RTO: 17 % (ref 11.5–14.5)
PERFORMED ON: ABNORMAL
PH ARTERIAL: 7.44 (ref 7.35–7.45)
PLATELET # BLD: 158 K/UL (ref 130–400)
PMV BLD AUTO: 10 FL (ref 9.4–12.4)
PO2 ARTERIAL: 82 MMHG (ref 80–100)
POTASSIUM SERPL-SCNC: 3.5 MMOL/L (ref 3.5–5)
POTASSIUM, WHOLE BLOOD: 3.4
RBC # BLD: 3.6 M/UL (ref 4.7–6.1)
RHINO/ENTEROVIRUS PCR: NOT DETECTED
RSV BY PCR: DETECTED
RSV SOURCE: ABNORMAL
SODIUM BLD-SCNC: 141 MMOL/L (ref 136–145)
TOTAL PROTEIN: 5.6 G/DL (ref 6.6–8.7)
WBC # BLD: 7.6 K/UL (ref 4.8–10.8)

## 2020-03-19 PROCEDURE — 6360000002 HC RX W HCPCS: Performed by: INTERNAL MEDICINE

## 2020-03-19 PROCEDURE — 99233 SBSQ HOSP IP/OBS HIGH 50: CPT | Performed by: INTERNAL MEDICINE

## 2020-03-19 PROCEDURE — 85730 THROMBOPLASTIN TIME PARTIAL: CPT

## 2020-03-19 PROCEDURE — 36600 WITHDRAWAL OF ARTERIAL BLOOD: CPT

## 2020-03-19 PROCEDURE — 6370000000 HC RX 637 (ALT 250 FOR IP): Performed by: INTERNAL MEDICINE

## 2020-03-19 PROCEDURE — 71045 X-RAY EXAM CHEST 1 VIEW: CPT

## 2020-03-19 PROCEDURE — 94640 AIRWAY INHALATION TREATMENT: CPT

## 2020-03-19 PROCEDURE — 6360000002 HC RX W HCPCS: Performed by: HOSPITALIST

## 2020-03-19 PROCEDURE — 85025 COMPLETE CBC W/AUTO DIFF WBC: CPT

## 2020-03-19 PROCEDURE — 2100000000 HC CCU R&B

## 2020-03-19 PROCEDURE — 2580000003 HC RX 258: Performed by: INTERNAL MEDICINE

## 2020-03-19 PROCEDURE — 80053 COMPREHEN METABOLIC PANEL: CPT

## 2020-03-19 PROCEDURE — 82803 BLOOD GASES ANY COMBINATION: CPT

## 2020-03-19 PROCEDURE — 83735 ASSAY OF MAGNESIUM: CPT

## 2020-03-19 PROCEDURE — 84132 ASSAY OF SERUM POTASSIUM: CPT

## 2020-03-19 PROCEDURE — 82947 ASSAY GLUCOSE BLOOD QUANT: CPT

## 2020-03-19 PROCEDURE — 99232 SBSQ HOSP IP/OBS MODERATE 35: CPT | Performed by: INTERNAL MEDICINE

## 2020-03-19 PROCEDURE — 94003 VENT MGMT INPAT SUBQ DAY: CPT

## 2020-03-19 PROCEDURE — 2700000000 HC OXYGEN THERAPY PER DAY

## 2020-03-19 PROCEDURE — 36592 COLLECT BLOOD FROM PICC: CPT

## 2020-03-19 PROCEDURE — C9113 INJ PANTOPRAZOLE SODIUM, VIA: HCPCS | Performed by: INTERNAL MEDICINE

## 2020-03-19 RX ADMIN — IPRATROPIUM BROMIDE AND ALBUTEROL SULFATE 1 AMPULE: .5; 3 SOLUTION RESPIRATORY (INHALATION) at 10:37

## 2020-03-19 RX ADMIN — ASPIRIN 81 MG 81 MG: 81 TABLET ORAL at 10:06

## 2020-03-19 RX ADMIN — PIPERACILLIN SODIUM AND TAZOBACTAM SODIUM 3.38 G: 3; .375 INJECTION, POWDER, LYOPHILIZED, FOR SOLUTION INTRAVENOUS at 04:01

## 2020-03-19 RX ADMIN — PANTOPRAZOLE SODIUM 40 MG: 40 INJECTION, POWDER, FOR SOLUTION INTRAVENOUS at 10:07

## 2020-03-19 RX ADMIN — SODIUM CHLORIDE, PRESERVATIVE FREE 10 ML: 5 INJECTION INTRAVENOUS at 10:08

## 2020-03-19 RX ADMIN — IPRATROPIUM BROMIDE AND ALBUTEROL SULFATE 1 AMPULE: .5; 3 SOLUTION RESPIRATORY (INHALATION) at 06:21

## 2020-03-19 RX ADMIN — IPRATROPIUM BROMIDE AND ALBUTEROL SULFATE 1 AMPULE: .5; 3 SOLUTION RESPIRATORY (INHALATION) at 18:29

## 2020-03-19 RX ADMIN — METOPROLOL TARTRATE 12.5 MG: 25 TABLET, FILM COATED ORAL at 10:06

## 2020-03-19 RX ADMIN — PROPOFOL 30 MCG/KG/MIN: 10 INJECTION, EMULSION INTRAVENOUS at 07:45

## 2020-03-19 RX ADMIN — MORPHINE SULFATE 2 MG: 4 INJECTION INTRAVENOUS at 15:13

## 2020-03-19 RX ADMIN — MORPHINE SULFATE 2 MG: 4 INJECTION INTRAVENOUS at 10:07

## 2020-03-19 RX ADMIN — PROPOFOL 30 MCG/KG/MIN: 10 INJECTION, EMULSION INTRAVENOUS at 23:05

## 2020-03-19 RX ADMIN — PIPERACILLIN SODIUM AND TAZOBACTAM SODIUM 3.38 G: 3; .375 INJECTION, POWDER, LYOPHILIZED, FOR SOLUTION INTRAVENOUS at 20:46

## 2020-03-19 RX ADMIN — PROPOFOL 20 MCG/KG/MIN: 10 INJECTION, EMULSION INTRAVENOUS at 17:20

## 2020-03-19 RX ADMIN — METOPROLOL TARTRATE 12.5 MG: 25 TABLET, FILM COATED ORAL at 20:46

## 2020-03-19 RX ADMIN — IPRATROPIUM BROMIDE AND ALBUTEROL SULFATE 1 AMPULE: .5; 3 SOLUTION RESPIRATORY (INHALATION) at 14:11

## 2020-03-19 RX ADMIN — ATORVASTATIN CALCIUM 40 MG: 40 TABLET, FILM COATED ORAL at 20:46

## 2020-03-19 RX ADMIN — PROPOFOL 20 MCG/KG/MIN: 10 INJECTION, EMULSION INTRAVENOUS at 15:45

## 2020-03-19 RX ADMIN — PIPERACILLIN SODIUM AND TAZOBACTAM SODIUM 3.38 G: 3; .375 INJECTION, POWDER, LYOPHILIZED, FOR SOLUTION INTRAVENOUS at 13:48

## 2020-03-19 RX ADMIN — PROPOFOL 40 MCG/KG/MIN: 10 INJECTION, EMULSION INTRAVENOUS at 00:38

## 2020-03-19 RX ADMIN — PROPOFOL 40 MCG/KG/MIN: 10 INJECTION, EMULSION INTRAVENOUS at 04:00

## 2020-03-19 ASSESSMENT — PULMONARY FUNCTION TESTS
PIF_VALUE: 25
PIF_VALUE: 41
PIF_VALUE: 34
PIF_VALUE: 32
PIF_VALUE: 49
PIF_VALUE: 28
PIF_VALUE: 42
PIF_VALUE: 42
PIF_VALUE: 55
PIF_VALUE: 35
PIF_VALUE: 39
PIF_VALUE: 28
PIF_VALUE: 41
PIF_VALUE: 35
PIF_VALUE: 27
PIF_VALUE: 38
PIF_VALUE: 36
PIF_VALUE: 11
PIF_VALUE: 34
PIF_VALUE: 31
PIF_VALUE: 33
PIF_VALUE: 26
PIF_VALUE: 24
PIF_VALUE: 55
PIF_VALUE: 11
PIF_VALUE: 40
PIF_VALUE: 38
PIF_VALUE: 39
PIF_VALUE: 36
PIF_VALUE: 30
PIF_VALUE: 31
PIF_VALUE: 40
PIF_VALUE: 38
PIF_VALUE: 26
PIF_VALUE: 46
PIF_VALUE: 12

## 2020-03-19 ASSESSMENT — PAIN SCALES - GENERAL
PAINLEVEL_OUTOF10: 0
PAINLEVEL_OUTOF10: 3
PAINLEVEL_OUTOF10: 3
PAINLEVEL_OUTOF10: 0
PAINLEVEL_OUTOF10: 0

## 2020-03-19 NOTE — PROGRESS NOTES
Pulmonary and Critical Care Progress Note Jaime Bashir   MR# [id number]  Acct# [de-identified]  3/19/2020   9:56 AM CDT   Referring Provider: Clyde Sanz DO  Chief Complaint: Mechanically ventilated    HPI: Patient remains sedated and mechanically ventilated. Remains on a heparin and propofol drip. SCDs in place. Per nursing, Cardiology no plans for heart cath until extubated. He has tested postive for RSV. He is actively breathing over the ventilator. Saturation 97% on a PEEP of 5 and FiO2 0.30. Asher and rectal tube remain in place. ABGs reviewed and stable. CXR stable. No other issues. No family present. Vt 600 today, was 500 yesterday. Past Medical History  Past Medical History:   Diagnosis Date    CAD (coronary artery disease)     Chest pain 11/14/2018    [facility]est[facility]     COPD [facility]chronic obstructive pulmonary disease) [facility]     Hyperlipidemia     Hypertension     Palliative care patient 03/12/2020    Pneumonia      Surgical History  Past Surgical History:   Procedure Laterality Date    APPENDECTOMY      BACK SURGERY      COLONOSCOPY      ENDOSCOPY, COLON, DIAGNOSTIC       Allergies  No Known Allergies  Medications  lidocaine 1 % injection, 5 mL, Intradermal, Once    sodium chloride flush, 10 mL, Intravenous, 2 times per day    metoprolol tartrate, 12.5 mg, Oral, BID    pantoprazole, 40 mg, Intravenous, Daily    insulin lispro, 0-6 Units, Subcutaneous, TID WC    insulin lispro, 0-3 Units, Subcutaneous, Nightly    aspirin, 81 mg, Oral, Daily    atorvastatin, 40 mg, Oral, Nightly    ipratropium-albuterol, 1 ampule, Inhalation, Q4H WA    piperacillin-tazobactam, 3.375 g, Intravenous, Q8H   Social History   reports that he quit smoking about 21 months ago. His smoking use included cigarettes. He has a 50.00 pack-year smoking history. His smokeless tobacco use includes chew. He reports previous alcohol use.  He reports that he does not use drugs. Family History  family history includes Cancer in his brother and sister. Review of Systems:    Cannot obtain due to mechanical ventilation    Physical Exam:   height is 6' (1.829 m) and weight is 221 lb 12.8 oz (100.6 kg). His axillary temperature is 98.3 °F (36.8 °C). His blood pressure is 123/57 (abnormal) and his pulse is 85. His respiration is 18 and oxygen saturation is 93%. Intake/Output Summary (Last 24 hours) at 3/19/2020 8898  Last data filed at 3/19/2020 0730  Gross per 24 hour   Intake 2020.18 ml   Output 1330 ml   Net 690.18 ml     Ventilator Settings:     Vent Mode: AC/VC/Vt Ordered: 600 mL/Rate Set: 14 bmp/ /PEEP/CPAP: 5/FiO2 : 30 %/   Peak Inspiratory Pressure: 38 cmH2O  Mean Airway Pressure: 10 cmH20  I:E Ratio: 1:5.50  Rate Measured: 18 br/min  Minute Volume: 8 Liters           Physical Exam  Constitutional:       General: He is not in acute distress. Appearance: He is ill-appearing. He is not toxic-appearing or diaphoretic. Interventions: He is sedated and intubated. HENT:      Head: Normocephalic and atraumatic. Nose: Nose normal.   Eyes:      General: No scleral icterus. Cardiovascular:      Rate and Rhythm: Normal rate. Heart sounds: No murmur. No friction rub. No gallop. Pulmonary:      Effort: Pulmonary effort is normal. No accessory muscle usage or respiratory distress. He is intubated. Breath sounds: No decreased air movement. Examination of the right-middle field reveals decreased breath sounds. Examination of the left-middle field reveals decreased breath sounds. Examination of the right-lower field reveals decreased breath sounds. Examination of the left-lower field reveals decreased breath sounds. Decreased breath sounds present. No wheezing or rhonchi. Abdominal:      General: Abdomen is flat. There is no distension. Palpations: Abdomen is soft. Skin:     General: Skin is warm and dry.       Comments: BUE with significant Ecchymosis   Neurological:      Mental Status: He is lethargic. Comments: Intubated and sedated, moving all extremities, agitated with wean of propofol        Recent laboratory:  Recent Labs     03/17/20 0403 03/18/20 0415 03/19/20 0410   WBC 8.3 8.0 7.6   RBC 4.15* 3.83* 3.60*   HGB 11.9* 10.9* 10.2*   HCT 38.2* 34.6* 32.5*    164 158   MCV 92.0 90.3 90.3   MCH 28.7 28.5 28.3   MCHC 31.2* 31.5* 31.4*   RDW 17.0* 17.0* 17.0*      Recent Labs     03/17/20 0403 03/18/20 0415 03/18/20 1415 03/19/20 0410 03/19/20 0446     --  142  --   --  141  --    K 4.0   < > 3.1*   < > 3.5 3.5 3.4     --  102  --   --  104  --    CO2 25  --  26  --   --  26  --    BUN 36*  --  26*  --   --  20  --    CREATININE 0.7  --  0.7  --   --  0.7  --    CALCIUM 8.2*  --  8.1*  --   --  8.2*  --    GLUCOSE 101  --  89  --   --  102  --     < > = values in this interval not displayed. Recent Labs     03/17/20 0417 03/18/20 0431 03/19/20 0446   PHART 7.430 7.470* 7.440   TJR0CQJ 46.0* 40.0 44.0   PO2ART 61.0* 77.0* 82.0   NLY0RSG 30.5* 29.1* 29.9*   O8FFOSUT 92.7 94.7 95.2   BEART 5.4* 5.0* 5.1*     Recent Labs     03/18/20 0415 03/19/20 0410   AST 25 21   ALT 31 25   ALKPHOS 51 50   BILITOT 0.5 0.5   MG 2.3 2.2   CALCIUM 8.1* 8.2*   INR 1.03  --      Radiograph:  Frontal supine radiograph of the chest 3/17/2020 5:00 AM   History: Intubation   Comparison: Chest x-ray dated 3/16/2020    Findings:    Lines and tubes are stable in position.  No new opacities or   pneumothoraces are visualized in the chest. The cardiomediastinal   silhouette and pulmonary vascularity are unchanged.     No acute osseous or soft tissue abnormality is noted.        Impression   Impression:    1.   No significant interval change since previous exam.   Signed by Dr Erica Decker on 3/17/2020 7:27 AM     My radiograph interpretation/independent review of imaging: Endotracheal tube in good position, lung fields clear, chronic COPD changes, nothing acute -stable 3/19/20     Problem list as generated by [employer]:  Patient Active Problem List   Diagnosis    CAD (coronary artery disease)    Acute ST elevation myocardial infarction (STEMI) involving left anterior descending (LAD) coronary artery (HCC)    Electronic cigarette use    [facility]    Chest pain    Syncope and collapse    COPD exacerbation [facility]    Hypertension    NSTEMI (non-ST elevated myocardial infarction) [facility]    Palliative care patient     Pulmonary Assessment:    1. Acute on chronic hypoxic respiratory failure still requiring vent stable but still unable to proceed with spontaneous breathing trial yet  2. Nstemi,  leading to above with history of coronary artery disease  3. Severe, stage III COPD with coexisting restrictive disease and severely reduced diffusion capacity. On BD  4. Congestive heart failure appears improved  5. Mild metabolic alkalosis     Recommend/plan:     · Decreased Vt to 550. ABGs reviewed. CXR stable. Discussed with nursing and attending. Recommend ativan for agitation and wean of sedation. If Neuro remains intact, ok for SBT. · Continue Chest x-ray ABG daily while on the ventilator  · Continue Stress ulcer and DVT prophylaxis  · Bronchodilators  · Monitor off steroids  · Enteral nutrition     Electronically signed by Lucinda Forrest on 3/19/2020 at 9:07 AM     Physician Substantive portion:  Pt remains on vent, sedated. Has poorly tolerated spontaneous breathing to this point. On isolation for RSV. Cxr my interpretation: stable copd changes. Plan vent adjustments, continue daily spontaneous breathing trials. I have seen and examined patient personally, performing a face-to-face diagnostic evaluation with plan of care reviewed and developed with APRN and nursing staff.  I have addended and/or modified the above history of present illness, physical examination, and assessment and plan to reflect my findings and impressions. Essential elements of the care plan were discussed with APRN above. Agree with findings and assessment/plan as documented above.     Electronically signed by Katina Valencia on 3/19/2020, 11:00 PM

## 2020-03-19 NOTE — PROGRESS NOTES
[address]    Daily HOSPITAL Progress Note                            Date:  3/18/20  Patient: [place] Jurado  Admission:  3/11/2020  5:38 PM  Admit DX: NSTEMI (non-ST elevated myocardial infarction) [facility]) [I21.4]  Age:  81 y.o., 1950        Date of Admission 3/11/2020  5:38 PM   Hospital Length of Stay  LOS: 7 days            I personally saw the patient and rounded with:  Francisco Javier Kennedy RN on 3/18/20    The observations documented in this note, including the assessment   and plan are mine    Portions of this note have been copied forward, from prior notes, yet modified, to reflect today's clinical status of this patient. Reason for initial evaluation or the patient's initial complaint    1. Reason for Hospital Admission: Acute respiratory failure        2. Reason for Cardiology Consultation: Elevated troponin         SUBJECTIVE:      Chief Complaint / Reason for the Visit   Follow up of:  Acute respiratory failure and elevated troponin and systemic arterial hypertension    Family present and in room during examination:  No    At the time of the examination, the patient was:  Lying in bed, sedated on vent      Specialty Problems        Cardiology Problems    STEMI (ST elevation myocardial infarction) ([address]        Chest pain        Syncope and collapse        Hypertension        NSTEMI (non-ST elevated myocardial infarction) ([facility] [address]        Acute ST elevation myocardial infarction (STEMI) involving left anterior descending (LAD) coronary artery (HCC)        CAD (coronary artery disease)              Current Status Today According to the patient:  \"0\"    Subjective:  Mr. [place] Jurado is generally feeling unchanged. Remains on vent    Mr. [place] Jurado has the following cardiac complaints / symptoms today:    1. Acute respiratory failure, on wean trials    2. CAD, probably needs a cath    3.  Hypertension    The blood pressure for the lastr 36 hours has been:  Systolic (36hrs), Av , Min:97 , DHY:555    Diastolic (74SVR), RAP:37, Min:60, Max:79        Uche Rojas is a 81 y.o. male with the following history as [facility] in [facility]:    Patient Active Problem List    Diagnosis Date Noted    Palliative care patient 2020     Priority: Low    NSTEMI (non-ST elevated myocardial infarction) ([facility] 2020     Priority: Low    COPD exacerbation ([facility] 2019     Priority: Low    Hypertension 2019     Priority: Low    Syncope and collapse 2019     Priority: Low    Chest pain 2018     Priority: Low    STEMI (ST elevation myocardial infarction) ([facility] 2018     Priority: Low    CAD (coronary artery disease) 2018    Acute ST elevation myocardial infarction (STEMI) involving left anterior descending (LAD) coronary artery (HCC)     Electronic cigarette use      Current Facility-Administered Medications   Medication Dose Route Frequency Provider Last Rate Last Dose    potassium chloride 20 mEq/50 mL IVPB (Central Line)  20 mEq Intravenous PRN John Jalloh DO 50 mL/hr at 20 1058 20 mEq at 20 1058    lidocaine PF 1 % injection 5 mL  5 mL Intradermal Once Diogenes Marrero MD        sodium chloride flush 0.9 % injection 10 mL  10 mL Intravenous 2 times per day Diogenes Marrero MD   10 mL at 20 0917    sodium chloride flush 0.9 % injection 10 mL  10 mL Intravenous PRN Diogenes Marrero MD        metoprolol tartrate (LOPRESSOR) tablet 12.5 mg  12.5 mg Oral BID Catie Bhatia MD   12.5 mg at 20 0914    morphine injection 2 mg  2 mg Intravenous Q1H PRN Jacky Oscar MD   2 mg at 20 1221    pantoprazole (PROTONIX) injection 40 mg  40 mg Intravenous Daily Diogenes Marrero MD   40 mg at 20 0913    insulin lispro (HUMALOG) injection vial 0-6 Units  0-6 Units Subcutaneous TID WC Diogenes Marrero MD        insulin lispro (HUMALOG) injection vial 0-3 Units  0-3 Units Subcutaneous Nightly [place] [facility] Brother      Social History     Tobacco Use    Smoking status: Former Smoker     Packs/day: 1.00     Years: 50.00     Pack years: 50.00     Types: Cigarettes     Last attempt to quit: 2018     Years since quittin.7    Smokeless tobacco: Current User     Types: Chew   Substance Use Topics    Alcohol use: Not Currently     Frequency: Never     Comment: occassional          Review of Systems:    General:      Complaint / Symptom Yes / No / Description if Yes       Fatigue Yes:  chronic   Weight gain NA   Insomnia NA       Respiratory:        Complaint / Symptom Yes / No / Description if Yes       Cough No   Horseness NA       Cardiovascular:    Complaint / Symptom Yes / No / Description if Yes       Chest Pain N/A   Shortness of Air / Orthopnea Yes: chronic and on vent   Presyncope / Syncope No   Palpitations No         Objective:    /72   Pulse 93   Temp 98.8 °F (37.1 °C) (Temporal)   Resp 20   Ht 6' (1.829 m)   Wt 219 lb 3.2 oz (99.4 kg)   SpO2 95%   BMI 29.73 kg/m² ,     Intake/Output Summary (Last 24 hours) at 3/18/2020 2011  Last data filed at 3/18/2020 0955  Gross per 24 hour   Intake 976 ml   Output 600 ml   Net 376 ml       GENERAL - well developed and well nourished, is not an active participant in this examination  HEENT -  PERRLA, Hearing not tested, conjunctiva and lids are normal, ears and nose appear normal  NECK - no thyromegaly, no JVD, trachea is in the midline  CARDIOVASCULAR - PMI is in the left mid line clavicular position, Normal S1 and S2 with a grade 1/6 systolic murmur. No S3 or S4    PULMONARY - Yes: on vent respiratory distress. scattered wheezes and rales.   Breath sounds in both  lung fields are Decreased  ABDOMEN  - soft, non tender, no rebound, no hepatomegaly or splenomegaly  MUSCULOSKELETAL  - Prone/Supine, digitals and nails are without clubbing or cyanosis  EXTREMITIES - trace edema  NEUROLOGIC - Intubated and sedated and not particularly responsive SKIN - turgor is normal, no rash  PSYCHIATRIC - Intubated and sedated and not particularly responsive       ASSESSMENT:    ALL THE CARDIOLOGY PROBLEMS ARE LISTED ABOVE; HOWEVER, THE FOLLOWING SPECIFIC CARDIAC PROBLEMS / CONDITIONS WERE ADDRESSED AND TREATED DURING THE OFFICE VISIT TODAY:                                                                                            MEDICAL DECISION MAKING                   Cardiac Specific Problem / Diagnosis   Discussion and Data Reviewed Diagnostic Procedures Ordered Management Options Selected                 1. Presenting problem / symptom     Probable non Q wave MI with resulting acute respiratory failure  show no change    The patient is a possible candidate for a re cath. Domonique Reyna reviewed his films today and I did place stents I the LAD at the time of his anterior MI. Nicky Bib were bare metal stents.  It is possible that they could have restenosed or his had disease progression in the other diffusely diseased arteries.   No Continue current medications:      Yes:                  2. CAD Initial presentation during this evaluation    Review and summation of old records:     2000 stents  6/5/18  Anterior STEMI, AUC indication 2, AUC score 7  6/5/18  Cath 99% mid LAD, 2.5 x 18 and 2.5 x 28 BMS, diffuse CAD, anterior lateral dyskinesis, EF 40%  3/13/2020  Echo  Normal LVFX    No Continue current medications:     Yes:                  3. Concern regarding systemic blood pressure Initial presentation during this evaluation The blood pressure for the lastr 36 hours has been:  Systolic (56OQE), YKU:121 , Min:97 , COZ:217    Diastolic (11AFZ), JKC:25, Min:60, Max:86       No Continue current medications:        yes            PLAN:     1. Continue present medications except for changes as noted above  2. Continue to monitor rhythm  3.  Further orders per clinical course.        Summary of hospital course thus far:        Date Clinical Event Plan of Treatment           03/16/20 Reviewed films from 6/5/2018 Discussion regarding goal of this clinical course   03/17/20    Awaiting weaning trial and decision on clinical course As noted   03/18/20    Weaning trial of the vent  Depends on clinical course                                Discussed with nursing.     Electronically signed by Bi Last MD on 3/18/20  [facility]

## 2020-03-19 NOTE — PROGRESS NOTES
SBT started at 1417. Pt was tolerating well until 1500. HR sinus 90's and RR 26-33. Pt had coughing episode around 1500. pt was suctioned, which make him anxious, and HR up to 109 during suctioning. Pt RR up to 41. Attempted to assist pt to calm his breathing with minimal success back to 35-38. Morphine 2mg IV given to help with comfort. RT here and pt was placed back to vent at 1520 and Propofol was placed back at 20mcg/kg/min. Pt's RR rate now 22 HR  93-99 at present. Sats remained 94-96% during trial and present.  Electronically signed by Martina Allen RN on 3/19/2020 at 6:22 PM

## 2020-03-19 NOTE — PROGRESS NOTES
Daily Kely Ford MD   81 mg at 03/18/20 0914    atorvastatin (LIPITOR) tablet 40 mg  40 mg Oral Nightly Kely Ford MD   40 mg at 03/18/20 2051    glucose (GLUTOSE) 40 % oral gel 15 g  15 g Oral PRN Kely Ford MD        dextrose 50 % IV solution  12.5 g Intravenous PRN Kely Ford MD        glucagon (rDNA) injection 1 mg  1 mg Intramuscular PRN Kely Ford MD        dextrose 5 % solution  100 mL/hr Intravenous PRN Kely Ford MD        ipratropium-albuterol (DUONEB) nebulizer solution 1 ampule  1 ampule Inhalation Q4H WA Kely Ford MD   1 ampule at 03/19/20 0621    piperacillin-tazobactam (ZOSYN) 3.375 g in dextrose 5 % 50 mL IVPB extended infusion (mini-bag)  3.375 g Intravenous Thiago Granados MD   Stopped at 03/19/20 0804    heparin (porcine) injection 4,000 Units  4,000 Units Intravenous PRN Kely Ford MD   4,000 Units at 03/15/20 0618    heparin (porcine) injection 2,000 Units  2,000 Units Intravenous PRN Kely Ford MD        heparin 25,000 units in dextrose 5% 250 mL infusion  10 Units/kg/hr Intravenous Continuous Kely Ford MD 7.5 mL/hr at 03/18/20 1335 7 Units/kg/hr at 03/18/20 1335    propofol injection  10 mcg/kg/min Intravenous Titrated Chance Galindo MD 12.8 mL/hr at 03/19/20 0815 20 mcg/kg/min at 03/19/20 0815       Past Medical History:  Past Medical History:   Diagnosis Date    CAD (coronary artery disease)     Chest pain 11/14/2018    [facility]     COPD [facility]chronic obstructive pulmonary disease) [facility]     Hyperlipidemia     Hypertension     Palliative care patient 03/12/2020    Pneumonia        Past Surgical History:  Past Surgical History:   Procedure Laterality Date    APPENDECTOMY      BACK SURGERY      COLONOSCOPY      ENDOSCOPY, COLON, DIAGNOSTIC         Family History  Family History   Problem Relation Age of Onset    Cancer Sister     Cancer Brother        Social History  Social History infiltrates. The heart is normal in size, pulmonary circulation appropriate, without heart failure. The bony structures are intact. 1. Line and support tubes well-positioned. 2. No acute cardiopulmonary process. Signed by Dr Edilia Medina on 3/11/2020 7:07 PM       Assessment     Non-ST segment elevation MI. Eventual cardiac cath.     Exacerbation of COPD. Medical management.     Hypoxemic respiratory failure. Continue vent support.     RSV pneumonia. Supportive care.       Barber Tamez DO

## 2020-03-19 NOTE — PROGRESS NOTES
Nutrition Assessment (Enteral Nutrition)    Type and Reason for Visit: Reassess    Nutrition Recommendations: continue current nutritional interventions    Nutrition Assessment: TF continues at 20ml with 40ml free water and Proteinex tid as tf flush. Appears to be tolerating with residual 0-10ml. No in isolation    Malnutrition Assessment:  · Malnutrition Status: No malnutrition  · Context: Acute illness or injury  · Findings of the 6 clinical characteristics of malnutrition (Minimum of 2 out of 6 clinical characteristics is required to make the diagnosis of moderate or severe Protein Calorie Malnutrition based on AND/ASPEN Guidelines):  1. Energy Intake-Greater than 75% of estimated energy requirement,      2. Weight Loss-No significant weight loss,    3. Fat Loss-No significant subcutaneous fat loss,    4. Muscle Loss-No significant muscle mass loss,    5. Fluid Accumulation-Mild fluid accumulation, Extremities  6.   Strength-Not measured    Nutrition Risk Level: High    Nutrition Needs:  · Estimated Daily Total Kcal: 8922-0501(11-14 kcal/kg)  · Estimated Daily Protein (g): 162-177(2-2.2 g/kg)  · Estimated Daily Fluid (ml/day): 2368-4115    Nutrition Diagnosis:   · Problem: Inadequate oral intake  · Etiology: related to Impaired respiratory function-inability to consume food     Signs and symptoms:  as evidenced by NPO status due to medical condition, Intubation, Nutrition support - EN    Objective Information:  · Nutrition-Focused Physical Findings: on vent  · Wound Type: None  · Current Nutrition Therapies:  · Oral Diet Orders: NPO   · Oral Diet intake: NPO  · Oral Nutrition Supplement (ONS) Orders: None  · ONS intake: NPO  · Tube Feeding (TF) Orders:   · Feeding Route: Nasogastric  · Formula: Semi-elemental  · Rate (ml/hr):20    · Volume (ml/day): 480  · Duration: Continuous  · Additives/Modulars: Protein  · Water Flushes: 40ml hourly via tf pump  · Current TF & Flush Orders Provides: 20ml = 480

## 2020-03-19 NOTE — PROGRESS NOTES
[address]    Daily HOSPITAL Progress Note                            Date:  3/19/20  Patient: Emil Jimenez  Admission:  3/11/2020  5:38 PM  Admit DX: NSTEMI (non-ST elevated myocardial infarction) [facility]) [I21.4]  Age:  81 y.o., 1950        Date of Admission 3/11/2020  5:38 PM   Hospital Length of Stay  LOS: 8 days            I personally saw the patient and rounded with:  Julienne uF RN on 3/19/20    The observations documented in this note, including the assessment   and plan are mine    Portions of this note have been copied forward, from prior notes, yet modified, to reflect today's clinical status of this patient. Reason for initial evaluation or the patient's initial complaint    1. Reason for Hospital Admission: Dyspnea, acute respiratory failure        2. Reason for Cardiology Consultation: Elevated troponin, known CAD         SUBJECTIVE:      Chief Complaint / Reason for the Visit   Follow up of:  Acute respiratory failur and elevated troponin and systemic arterial hypertension    Family present and in room during examination:  No    At the time of the examination, the patient was: On ventilator, weaning (hopefully ! !)      Specialty Problems        Cardiology Problems    STEMI (ST elevation myocardial infarction) ([facility] [address]        Chest pain        Syncope and collapse        Hypertension        NSTEMI (non-ST elevated myocardial infarction) ([facility])        Acute ST elevation myocardial infarction (STEMI) involving left anterior descending (LAD) coronary artery ([facility])        CAD (coronary artery disease)              Current Status Today According to the patient:  \"0\"    Subjective:  Mr. Emil Jimenez is generally feeling unchanged. As above    Mr. Emil Jimenez has the following cardiac complaints / symptoms today:    1. dyspnea, on vent    2. Elevated troponin, peak 0.18 and then decreased to 0.10    3.  Hypertension    The blood pressure for the lastr 36 hours has been:  Systolic (51FRK), BFE:637 , Min:97 , ZVB:287    Diastolic (55NZU), WBE:32, Min:55, Max:86        Carolyne Eden is a 81 y.o. male with the following history as recorded in [facility]:    Patient Active Problem List    Diagnosis Date Noted    Palliative care patient 03/12/2020     Priority: Low    NSTEMI (non-ST elevated myocardial infarction) ([facility] 03/11/2020     Priority: Low    COPD exacerbation ([facility] 04/18/2019     Priority: Low    Hypertension 04/18/2019     Priority: Low    Syncope and collapse 04/17/2019     Priority: Low    Chest pain 11/14/2018     Priority: Low    STEMI (ST elevation myocardial infarction) ([facility] 06/05/2018     Priority: Low    CAD (coronary artery disease) 06/05/2018    Acute ST elevation myocardial infarction (STEMI) involving left anterior descending (LAD) coronary artery (HCC)     Electronic cigarette use      Current Facility-Administered Medications   Medication Dose Route Frequency Provider Last Rate Last Dose    potassium chloride 20 mEq/50 mL IVPB (Central Line)  20 mEq Intravenous PRN Clyde Sinks, DO 50 mL/hr at 03/18/20 1058 20 mEq at 03/18/20 1058    lidocaine PF 1 % injection 5 mL  5 mL Intradermal Once Kely Ford MD        sodium chloride flush 0.9 % injection 10 mL  10 mL Intravenous 2 times per day Kely Ford MD   10 mL at 03/19/20 1008    sodium chloride flush 0.9 % injection 10 mL  10 mL Intravenous PRN Kely Ford MD        metoprolol tartrate (LOPRESSOR) tablet 12.5 mg  12.5 mg Oral BID Hernandez Tuttle MD   12.5 mg at 03/19/20 1006    morphine injection 2 mg  2 mg Intravenous Q1H PRN Sirena Valdez MD   2 mg at 03/19/20 1007    pantoprazole (PROTONIX) injection 40 mg  40 mg Intravenous Daily Kely Ford MD   40 mg at 03/19/20 1007    insulin lispro (HUMALOG) injection vial 0-6 Units  0-6 Units Subcutaneous TID  Kely Ford MD        insulin lispro (HUMALOG) injection vial 0-3 Units  0-3 Units Subcutaneous Nightly Derrell Chavira MD   1 Units at 03/11/20 2211    aspirin chewable tablet 81 mg  81 mg Oral Daily Derrell Chavira MD   81 mg at 03/19/20 1006    atorvastatin (LIPITOR) tablet 40 mg  40 mg Oral Nightly Derrell Chavira MD   40 mg at 03/18/20 2051    glucose (GLUTOSE) 40 % oral gel 15 g  15 g Oral PRN Derrell Chavira MD        dextrose 50 % IV solution  12.5 g Intravenous PRN Derrell Chavira MD        glucagon (rDNA) injection 1 mg  1 mg Intramuscular PRN Derrell Chavira MD        dextrose 5 % solution  100 mL/hr Intravenous PRN Derrell Chavira MD        ipratropium-albuterol (DUONEB) nebulizer solution 1 ampule  1 ampule Inhalation Q4H WA Derrell Chavira MD   1 ampule at 03/19/20 1037    piperacillin-tazobactam (ZOSYN) 3.375 g in dextrose 5 % 50 mL IVPB extended infusion (mini-bag)  3.375 g Intravenous Jil Humphrey MD   Stopped at 03/19/20 0804    heparin (porcine) injection 4,000 Units  4,000 Units Intravenous PRN Derrell Chavira MD   4,000 Units at 03/15/20 0618    heparin (porcine) injection 2,000 Units  2,000 Units Intravenous PRN Derrell Chavira MD        heparin 25,000 units in dextrose 5% 250 mL infusion  10 Units/kg/hr Intravenous Continuous Derrell Chavira MD 7.5 mL/hr at 03/19/20 1117 7 Units/kg/hr at 03/19/20 1117    propofol injection  10 mcg/kg/min Intravenous Titrated Neftali Wu MD 12.8 mL/hr at 03/19/20 0815 20 mcg/kg/min at 03/19/20 0815     Allergies: Patient has no known allergies.   Past Medical History:   Diagnosis Date    CAD (coronary artery disease)     Chest pain 11/14/2018    [facility]congest[facility]     COPD (chronic obstructive pulmonary disease) ([facility]     Hyperlipidemia     Hypertension     Palliative care patient 03/12/2020    Pneumonia      Past Surgical History:   Procedure Laterality Date    APPENDECTOMY      BACK SURGERY      COLONOSCOPY      ENDOSCOPY, COLON, DIAGNOSTIC       Family History   Problem Relation Age of Onset    Cancer Sister     Cancer Brother      Social History     Tobacco Use    Smoking status: Former Smoker     Packs/day: 1.00     Years: 50.00     Pack years: 50.00     Types: Cigarettes     Last attempt to quit: 2018     Years since quittin.7    Smokeless tobacco: Current User     Types: Chew   Substance Use Topics    Alcohol use: Not Currently     Frequency: Never     Comment: occassional          Review of Systems:    General:      Complaint / Symptom Yes / No / Description if Yes       Fatigue Yes:  chronic   Weight gain NA   Insomnia NA       Respiratory:        Complaint / Symptom Yes / No / Description if Yes       Cough No   Horseness NA       Cardiovascular:    Complaint / Symptom Yes / No / Description if Yes       Chest Pain N/A   Shortness of Air / Orthopnea Yes: chronic and on vent weaning   Presyncope / Syncope No   Palpitations No         Objective:    BP (!) 146/65   Pulse 90   Temp 98.3 °F (36.8 °C) (Axillary)   Resp 18   Ht 6' (1.829 m)   Wt 221 lb 12.8 oz (100.6 kg)   SpO2 93%   BMI 30.08 kg/m² ,     Intake/Output Summary (Last 24 hours) at 3/19/2020 1247  Last data filed at 3/19/2020 0730  Gross per 24 hour   Intake 1784.2 ml   Output 1330 ml   Net 454.2 ml       GENERAL - well developed and well nourished, is not an active participant in this examination  HEENT -  PERRLA, Hearing appears normal, conjunctiva and lids are normal, ears and nose appear normal  NECK - no thyromegaly, no JVD, trachea is in the midline  CARDIOVASCULAR - PMI is in the left mid line clavicular position, Normal S1 and S2 with a grade 1/6 systolic murmur. No S3 or S4    PULMONARY - Yes: intubated respiratory distress. scattered wheezes and rales.   Breath sounds in both  lung fields are Decreased  ABDOMEN  - soft, non tender, no rebound, no hepatomegaly or splenomegaly  MUSCULOSKELETAL  - Prone/Supine, digitals and nails are without clubbing or cyanosis  EXTREMITIES - trace+ edema  NEUROLOGIC -

## 2020-03-19 NOTE — PROGRESS NOTES
Lab result came in and reported patient was RSV positive. Called lab to verify results accurate. Lab verified results and I placed patient in isolation. Notified clinical house as patient was not already in isolation.   Notified Dr. Conor Llanos and will pass on to day team.  Electronically signed by Kaley Bello RN on 3/19/2020 at 3:21 AM

## 2020-03-19 NOTE — PROGRESS NOTES
Results for Chino Brown (MRN [id number]) as of 3/19/2020 05:02   Ref.  Range 3/19/2020 04:46   Hemoglobin, Art, Extended Latest Ref Range: 14.0 - 18.0 g/dL 10.5 (L)   pH, Arterial Latest Ref Range: 7.350 - 7.450  7.440   pCO2, Arterial Latest Ref Range: 35.0 - 45.0 mmHg 44.0   pO2, Arterial Latest Ref Range: 80.0 - 100.0 mmHg 82.0   HCO3, Arterial Latest Ref Range: 22.0 - 26.0 mmol/L 29.9 (H)   Base Excess, Arterial Latest Ref Range: -2.0 - 2.0 mmol/L 5.1 (H)   O2 Sat, Arterial Latest Ref Range: >92 % 95.2   O2 Content, Arterial Latest Ref Range: Not Established mL/dL 14.2   Methemoglobin, Arterial Latest Ref Range: <1.5 % 0.8   Carboxyhgb, Arterial Latest Ref Range: 0.0 - 5.0 % 2.1   AC/,14, 30% +5    LR  AT+

## 2020-03-20 ENCOUNTER — APPOINTMENT (OUTPATIENT)
Dept: GENERAL RADIOLOGY | Age: 70
DRG: 207 | End: 2020-03-20
Attending: INTERNAL MEDICINE
Payer: MEDICARE

## 2020-03-20 ENCOUNTER — OUTSIDE FACILITY SERVICE (OUTPATIENT)
Dept: PULMONOLOGY | Facility: CLINIC | Age: 70
End: 2020-03-20

## 2020-03-20 LAB
ALBUMIN SERPL-MCNC: 2.7 G/DL (ref 3.5–5.2)
ALP BLD-CCNC: 49 U/L (ref 40–130)
ALT SERPL-CCNC: 21 U/L (ref 5–41)
ANION GAP SERPL CALCULATED.3IONS-SCNC: 12 MMOL/L (ref 7–19)
APTT: 48.2 SEC (ref 26–36.2)
APTT: 54.6 SEC (ref 26–36.2)
APTT: 55.1 SEC (ref 26–36.2)
APTT: 59.2 SEC (ref 26–36.2)
AST SERPL-CCNC: 17 U/L (ref 5–40)
BASE EXCESS ARTERIAL: 4.5 MMOL/L (ref -2–2)
BASOPHILS ABSOLUTE: 0 K/UL (ref 0–0.2)
BASOPHILS RELATIVE PERCENT: 0.5 % (ref 0–1)
BILIRUB SERPL-MCNC: 0.5 MG/DL (ref 0.2–1.2)
BUN BLDV-MCNC: 21 MG/DL (ref 8–23)
CALCIUM SERPL-MCNC: 8.1 MG/DL (ref 8.8–10.2)
CARBOXYHEMOGLOBIN ARTERIAL: 2.6 % (ref 0–5)
CHLORIDE BLD-SCNC: 100 MMOL/L (ref 98–111)
CO2: 26 MMOL/L (ref 22–29)
CREAT SERPL-MCNC: 0.7 MG/DL (ref 0.5–1.2)
EKG P AXIS: 54 DEGREES
EKG P-R INTERVAL: 152 MS
EKG Q-T INTERVAL: 398 MS
EKG QRS DURATION: 134 MS
EKG QTC CALCULATION (BAZETT): 450 MS
EKG T AXIS: 37 DEGREES
EOSINOPHILS ABSOLUTE: 0.4 K/UL (ref 0–0.6)
EOSINOPHILS RELATIVE PERCENT: 6.6 % (ref 0–5)
GFR NON-AFRICAN AMERICAN: >60
GLUCOSE BLD-MCNC: 102 MG/DL (ref 74–109)
GLUCOSE BLD-MCNC: 85 MG/DL (ref 70–99)
GLUCOSE BLD-MCNC: 89 MG/DL (ref 70–99)
GLUCOSE BLD-MCNC: 93 MG/DL (ref 70–99)
HCO3 ARTERIAL: 29.8 MMOL/L (ref 22–26)
HCT VFR BLD CALC: 30.4 % (ref 42–52)
HEMOGLOBIN, ART, EXTENDED: 10.1 G/DL (ref 14–18)
HEMOGLOBIN: 9.6 G/DL (ref 14–18)
IMMATURE GRANULOCYTES #: 0.2 K/UL
LYMPHOCYTES ABSOLUTE: 1.3 K/UL (ref 1.1–4.5)
LYMPHOCYTES RELATIVE PERCENT: 19.5 % (ref 20–40)
MAGNESIUM: 2.1 MG/DL (ref 1.6–2.4)
MCH RBC QN AUTO: 28.4 PG (ref 27–31)
MCHC RBC AUTO-ENTMCNC: 31.6 G/DL (ref 33–37)
MCV RBC AUTO: 89.9 FL (ref 80–94)
METHEMOGLOBIN ARTERIAL: 1.2 %
MONOCYTES ABSOLUTE: 0.4 K/UL (ref 0–0.9)
MONOCYTES RELATIVE PERCENT: 6.4 % (ref 0–10)
NEUTROPHILS ABSOLUTE: 4.1 K/UL (ref 1.5–7.5)
NEUTROPHILS RELATIVE PERCENT: 63.4 % (ref 50–65)
O2 CONTENT ARTERIAL: 13.5 ML/DL
O2 SAT, ARTERIAL: 94.4 %
O2 THERAPY: ABNORMAL
PCO2 ARTERIAL: 47 MMHG (ref 35–45)
PDW BLD-RTO: 17 % (ref 11.5–14.5)
PERFORMED ON: NORMAL
PH ARTERIAL: 7.41 (ref 7.35–7.45)
PLATELET # BLD: 160 K/UL (ref 130–400)
PMV BLD AUTO: 10.2 FL (ref 9.4–12.4)
PO2 ARTERIAL: 77 MMHG (ref 80–100)
POTASSIUM SERPL-SCNC: 3.5 MMOL/L (ref 3.5–5)
POTASSIUM, WHOLE BLOOD: 3.4
RBC # BLD: 3.38 M/UL (ref 4.7–6.1)
SODIUM BLD-SCNC: 138 MMOL/L (ref 136–145)
TOTAL PROTEIN: 5.7 G/DL (ref 6.6–8.7)
WBC # BLD: 6.4 K/UL (ref 4.8–10.8)

## 2020-03-20 PROCEDURE — 94640 AIRWAY INHALATION TREATMENT: CPT

## 2020-03-20 PROCEDURE — 2100000000 HC CCU R&B

## 2020-03-20 PROCEDURE — 6360000002 HC RX W HCPCS: Performed by: HOSPITALIST

## 2020-03-20 PROCEDURE — 84132 ASSAY OF SERUM POTASSIUM: CPT

## 2020-03-20 PROCEDURE — 99232 SBSQ HOSP IP/OBS MODERATE 35: CPT | Performed by: NURSE PRACTITIONER

## 2020-03-20 PROCEDURE — 94003 VENT MGMT INPAT SUBQ DAY: CPT

## 2020-03-20 PROCEDURE — 82947 ASSAY GLUCOSE BLOOD QUANT: CPT

## 2020-03-20 PROCEDURE — 36592 COLLECT BLOOD FROM PICC: CPT

## 2020-03-20 PROCEDURE — 99232 SBSQ HOSP IP/OBS MODERATE 35: CPT | Performed by: INTERNAL MEDICINE

## 2020-03-20 PROCEDURE — 83735 ASSAY OF MAGNESIUM: CPT

## 2020-03-20 PROCEDURE — 2700000000 HC OXYGEN THERAPY PER DAY

## 2020-03-20 PROCEDURE — 6360000002 HC RX W HCPCS: Performed by: INTERNAL MEDICINE

## 2020-03-20 PROCEDURE — 36600 WITHDRAWAL OF ARTERIAL BLOOD: CPT

## 2020-03-20 PROCEDURE — 71045 X-RAY EXAM CHEST 1 VIEW: CPT

## 2020-03-20 PROCEDURE — 80053 COMPREHEN METABOLIC PANEL: CPT

## 2020-03-20 PROCEDURE — 93010 ELECTROCARDIOGRAM REPORT: CPT | Performed by: INTERNAL MEDICINE

## 2020-03-20 PROCEDURE — 93005 ELECTROCARDIOGRAM TRACING: CPT | Performed by: INTERNAL MEDICINE

## 2020-03-20 PROCEDURE — 6370000000 HC RX 637 (ALT 250 FOR IP): Performed by: INTERNAL MEDICINE

## 2020-03-20 PROCEDURE — 82803 BLOOD GASES ANY COMBINATION: CPT

## 2020-03-20 PROCEDURE — 2580000003 HC RX 258: Performed by: INTERNAL MEDICINE

## 2020-03-20 PROCEDURE — 99233 SBSQ HOSP IP/OBS HIGH 50: CPT | Performed by: INTERNAL MEDICINE

## 2020-03-20 PROCEDURE — 85025 COMPLETE CBC W/AUTO DIFF WBC: CPT

## 2020-03-20 PROCEDURE — C9113 INJ PANTOPRAZOLE SODIUM, VIA: HCPCS | Performed by: INTERNAL MEDICINE

## 2020-03-20 PROCEDURE — 85730 THROMBOPLASTIN TIME PARTIAL: CPT

## 2020-03-20 RX ADMIN — PROPOFOL 45 MCG/KG/MIN: 10 INJECTION, EMULSION INTRAVENOUS at 22:02

## 2020-03-20 RX ADMIN — PIPERACILLIN SODIUM AND TAZOBACTAM SODIUM 3.38 G: 3; .375 INJECTION, POWDER, LYOPHILIZED, FOR SOLUTION INTRAVENOUS at 06:00

## 2020-03-20 RX ADMIN — METOPROLOL TARTRATE 12.5 MG: 25 TABLET, FILM COATED ORAL at 08:22

## 2020-03-20 RX ADMIN — HEPARIN SODIUM 7 UNITS/KG/HR: 10000 INJECTION, SOLUTION INTRAVENOUS at 01:57

## 2020-03-20 RX ADMIN — METOPROLOL TARTRATE 12.5 MG: 25 TABLET, FILM COATED ORAL at 20:21

## 2020-03-20 RX ADMIN — IPRATROPIUM BROMIDE AND ALBUTEROL SULFATE 1 AMPULE: .5; 3 SOLUTION RESPIRATORY (INHALATION) at 14:43

## 2020-03-20 RX ADMIN — PROPOFOL 40 MCG/KG/MIN: 10 INJECTION, EMULSION INTRAVENOUS at 14:08

## 2020-03-20 RX ADMIN — ASPIRIN 81 MG 81 MG: 81 TABLET ORAL at 08:22

## 2020-03-20 RX ADMIN — ATORVASTATIN CALCIUM 40 MG: 40 TABLET, FILM COATED ORAL at 20:21

## 2020-03-20 RX ADMIN — PROPOFOL 40 MCG/KG/MIN: 10 INJECTION, EMULSION INTRAVENOUS at 16:59

## 2020-03-20 RX ADMIN — SODIUM CHLORIDE, PRESERVATIVE FREE 10 ML: 5 INJECTION INTRAVENOUS at 20:21

## 2020-03-20 RX ADMIN — PROPOFOL 40 MCG/KG/MIN: 10 INJECTION, EMULSION INTRAVENOUS at 02:50

## 2020-03-20 RX ADMIN — PANTOPRAZOLE SODIUM 40 MG: 40 INJECTION, POWDER, FOR SOLUTION INTRAVENOUS at 08:22

## 2020-03-20 RX ADMIN — IPRATROPIUM BROMIDE AND ALBUTEROL SULFATE 1 AMPULE: .5; 3 SOLUTION RESPIRATORY (INHALATION) at 06:44

## 2020-03-20 RX ADMIN — IPRATROPIUM BROMIDE AND ALBUTEROL SULFATE 1 AMPULE: .5; 3 SOLUTION RESPIRATORY (INHALATION) at 18:33

## 2020-03-20 RX ADMIN — IPRATROPIUM BROMIDE AND ALBUTEROL SULFATE 1 AMPULE: .5; 3 SOLUTION RESPIRATORY (INHALATION) at 10:19

## 2020-03-20 ASSESSMENT — PULMONARY FUNCTION TESTS
PIF_VALUE: 31
PIF_VALUE: 36
PIF_VALUE: 31
PIF_VALUE: 36
PIF_VALUE: 57
PIF_VALUE: 56
PIF_VALUE: 44
PIF_VALUE: 36
PIF_VALUE: 24
PIF_VALUE: 34
PIF_VALUE: 34
PIF_VALUE: 38
PIF_VALUE: 41
PIF_VALUE: 43
PIF_VALUE: 30
PIF_VALUE: 41
PIF_VALUE: 35
PIF_VALUE: 36
PIF_VALUE: 42
PIF_VALUE: 43
PIF_VALUE: 42
PIF_VALUE: 43

## 2020-03-20 ASSESSMENT — PAIN SCALES - GENERAL
PAINLEVEL_OUTOF10: 0

## 2020-03-20 NOTE — PROGRESS NOTES
Hospitalist Progress Note    Patient:  Eric Redmna  YOB: 1950  Date of Service: 3/20/2020  MRN: [id number]   Acct: [de-identified]   Primary Care Physician: Destinee Morrison MD  Advance Directive: [facility]  Admit Date: 3/11/2020       Hospital Day: 9  Referring Provider: Barber Tamez DO    Patient Seen, Chart, Consults, Notes, Labs, Radiology studies reviewed. Subjective:  Eric Redman is a 81 y.o. male  whom we are following for hypoxemic respiratory failure, coronary artery disease, COPD, non-ST segment elevation MI. Hemodynamics are about the same. No new events overnight. We are continuing to wean. Allergies:  Patient has no known allergies.     Medicines:  Current Facility-Administered Medications   Medication Dose Route Frequency Provider Last Rate Last Dose    potassium chloride 20 mEq/50 mL IVPB (Central Line)  20 mEq Intravenous PRN Barber Tamez DO 50 mL/hr at 03/18/20 1058 20 mEq at 03/18/20 1058    lidocaine PF 1 % injection 5 mL  5 mL Intradermal Once Julia Do MD        sodium chloride flush 0.9 % injection 10 mL  10 mL Intravenous 2 times per day Julia Do MD   10 mL at 03/19/20 1008    sodium chloride flush 0.9 % injection 10 mL  10 mL Intravenous PRN Julia Do MD        metoprolol tartrate (LOPRESSOR) tablet 12.5 mg  12.5 mg Oral BID Mane Farfan MD   12.5 mg at 03/20/20 4906    morphine injection 2 mg  2 mg Intravenous Q1H PRN Cuba Haney MD   2 mg at 03/19/20 1513    pantoprazole (PROTONIX) injection 40 mg  40 mg Intravenous Daily Julia Do MD   40 mg at 03/20/20 9690    insulin lispro (HUMALOG) injection vial 0-6 Units  0-6 Units Subcutaneous TID WC Julia Do MD        insulin lispro (HUMALOG) injection vial 0-3 Units  0-3 Units Subcutaneous Nightly Julia Do MD   1 Units at 03/11/20 2211    aspirin chewable tablet 81 mg  81 mg Oral Daily Julia Do MD   81 mg at 03/20/20 2042    atorvastatin (LIPITOR) tablet 40 mg  40 mg Oral Nightly Jody Solorzano MD   40 mg at 03/19/20 2046    glucose (GLUTOSE) 40 % oral gel 15 g  15 g Oral PRN Jody Solorzano MD        dextrose 50 % IV solution  12.5 g Intravenous PRN Jody Solorzano MD        glucagon (rDNA) injection 1 mg  1 mg Intramuscular PRN Jody Solorzano MD        dextrose 5 % solution  100 mL/hr Intravenous PRN Jody Solorzano MD        ipratropium-albuterol (DUONEB) nebulizer solution 1 ampule  1 ampule Inhalation Q4H Lionel Rosario MD   1 ampule at 03/20/20 1019    piperacillin-tazobactam (ZOSYN) 3.375 g in dextrose 5 % 50 mL IVPB extended infusion (mini-bag)  3.375 g Intravenous Angelo Nicole MD   Stopped at 03/20/20 1004    heparin (porcine) injection 4,000 Units  4,000 Units Intravenous PRN Jody Solorzano MD   4,000 Units at 03/15/20 0618    heparin (porcine) injection 2,000 Units  2,000 Units Intravenous PRN Jody Solorzano MD        heparin 25,000 units in dextrose 5% 250 mL infusion  10 Units/kg/hr Intravenous Continuous Jody Solorzano MD 7.5 mL/hr at 03/20/20 0350 7 Units/kg/hr at 03/20/20 0350    propofol injection  10 mcg/kg/min Intravenous Titrated Akosua Dejesus MD 25.7 mL/hr at 03/20/20 0250 40 mcg/kg/min at 03/20/20 0250       Past Medical History:  Past Medical History:   Diagnosis Date    CAD (coronary artery disease)     Chest pain 11/14/2018    [facility] [address]     COPD (chronic obstructive pulmonary disease) ([facility] [address]     Hyperlipidemia     Hypertension     Palliative care patient 03/12/2020    Pneumonia        Past Surgical History:  Past Surgical History:   Procedure Laterality Date    APPENDECTOMY      BACK SURGERY      COLONOSCOPY      ENDOSCOPY, COLON, DIAGNOSTIC         Family History  Family History   Problem Relation Age of Onset    Cancer Sister     Cancer Brother        Social History  Social History     Socioeconomic History    Marital status:  Spouse name: Calos Serrano    Number of children: 3    Years of education: 8    Highest education level: Not on file   Occupational History    Not on file   Social Needs    Financial resource strain: Not on file    Food insecurity     Worry: Not on file     Inability: Not on file    Transportation needs     Medical: Not on file     Non-medical: Not on file   Tobacco Use    Smoking status: Former Smoker     Packs/day: 1.00     Years: 50.00     Pack years: 50.00     Types: Cigarettes     Last attempt to quit: 2018     Years since quittin.7    Smokeless tobacco: Current User     Types: Chew   Substance and Sexual Activity    Alcohol use: Not Currently     Frequency: Never     Comment: occassional    Drug use: No    Sexual activity: Not on file   Lifestyle    Physical activity     Days per week: Not on file     Minutes per session: Not on file    Stress: Not on file   Relationships    Social connections     Talks on phone: Not on file     Gets together: Not on file     Attends Mandaen service: Not on file     Active member of club or organization: Not on file     Attends meetings of clubs or organizations: Not on file     Relationship status: Not on file    Intimate partner violence     Fear of current or ex partner: Not on file     Emotionally abused: Not on file     Physically abused: Not on file     Forced sexual activity: Not on file   Other Topics Concern    Not on file   Social History Narrative     50 years only marriage    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 rate 16, height 6' (1.829 m), weight 220 lb 8 oz (100 kg), SpO2 95 %. Intake/Output Summary (Last 24 hours) at 3/20/2020 1254  Last data filed at 3/20/2020 0600  Gross per 24 hour   Intake 1910.93 ml   Output 815 ml   Net 1095.93 ml       Physical Exam  Vitals signs reviewed. Constitutional:       Appearance: He is well-developed. HENT:      Head: Normocephalic and atraumatic. Eyes:      Conjunctiva/sclera: Conjunctivae normal.      Pupils: Pupils are equal, round, and reactive to light. Cardiovascular:      Rate and Rhythm: Normal rate and regular rhythm. Heart sounds: Normal heart sounds. Pulmonary:      Breath sounds: Normal breath sounds. Abdominal:      General: Abdomen is flat. Palpations: Abdomen is soft. Skin:     General: Skin is warm and dry. Labs:  BMP:   Recent Labs     03/18/20 0415 03/19/20 0410 03/19/20 0446 03/20/20 0310 03/20/20  0432     --  141  --  138  --    K 3.1*   < > 3.5 3.4 3.5 3.4     --  104  --  100  --    CO2 26  --  26  --  26  --    BUN 26*  --  20  --  21  --    CREATININE 0.7  --  0.7  --  0.7  --    CALCIUM 8.1*  --  8.2*  --  8.1*  --     < > = values in this interval not displayed. CBC:   Recent Labs     03/18/20 0415 03/19/20 0410 03/20/20 0310   WBC 8.0 7.6 6.4   HGB 10.9* 10.2* 9.6*   HCT 34.6* 32.5* 30.4*   MCV 90.3 90.3 89.9    158 160     LIVER PROFILE:   Recent Labs     03/18/20 0415 03/19/20 0410 03/20/20 0310   AST 25 21 17   ALT 31 25 21   BILITOT 0.5 0.5 0.5   ALKPHOS 51 50 49     PT/INR:   Recent Labs     03/18/20 0415   PROTIME 13.4   INR 1.03     APTT:   Recent Labs     03/19/20 2215 03/20/20 0310 03/20/20  1015   APTT 59.8* 59.2* 55.1*     BNP:  No results for input(s): BNP in the last 72 hours. Ionized Calcium:No results for input(s): IONCA in the last 72 hours.   Magnesium:  Recent Labs     03/18/20  0415 03/19/20  0410 03/20/20  0310   MG 2.3 2.2 2.1 process. Signed by Dr Juliette Blackwell on 3/11/2020 7:07 PM       Assessment     Non-ST segment elevation MI. Eventual cardiac cath.     Exacerbation of COPD. Medical management.     Hypoxemic respiratory failure. Continue vent support.     RSV pneumonia. Supportive care.       Dorothy Severino DO

## 2020-03-20 NOTE — PROGRESS NOTES
Results for Simon Joiner (MRN [id number]) as of 3/20/2020 04:35   Ref.  Range 3/20/2020 04:32   Hemoglobin, Art, Extended Latest Ref Range: 14.0 - 18.0 g/dL 10.1 (L)   pH, Arterial Latest Ref Range: 7.350 - 7.450  7.410   pCO2, Arterial Latest Ref Range: 35.0 - 45.0 mmHg 47.0 (H)   pO2, Arterial Latest Ref Range: 80.0 - 100.0 mmHg 77.0 (L)   HCO3, Arterial Latest Ref Range: 22.0 - 26.0 mmol/L 29.8 (H)   Base Excess, Arterial Latest Ref Range: -2.0 - 2.0 mmol/L 4.5 (H)   O2 Sat, Arterial Latest Ref Range: >92 % 94.4   O2 Content, Arterial Latest Ref Range: Not Established mL/dL 13.5   Methemoglobin, Arterial Latest Ref Range: <1.5 % 1.2   Carboxyhgb, Arterial Latest Ref Range: 0.0 - 5.0 % 2.6       AC/,14,30%,+5    LR AT+

## 2020-03-20 NOTE — PROGRESS NOTES
[address]    Daily HOSPITAL Progress Note                            Date:  3/20/20  Patient: Ghada Young  Admission:  3/11/2020  5:38 PM  Admit DX: NSTEMI (non-ST elevated myocardial infarction) [facility]) [I21.4]  Age:  81 y.o., 1950        Date of Admission 3/11/2020  5:38 PM   Hospital Length of Stay  LOS: 9 days            I personally saw the patient and rounded with:  Su Lawler RN Nurse Navigator on 3/20/20    The observations documented in this note, including the assessment   and plan are mine    Portions of this note have been copied forward, from prior notes, yet modified, to reflect today's clinical status of this patient. Reason for initial evaluation or the patient's initial complaint    1. Reason for Hospital Admission: dyspnea        2. Reason for Cardiology Consultation: CAD         SUBJECTIVE:      Chief Complaint / Reason for the Visit   Follow up of:  dyspnea and CAD and systemic arterial hypertension    Family present and in room during examination:  No    At the time of the examination, the patient was:  Remains on ventilator      Specialty Problems        Cardiology Problems    STEMI (ST elevation myocardial infarction) ([facility]        Chest pain        Syncope and collapse        Hypertension        NSTEMI (non-ST elevated myocardial infarction) ([facility]        Acute ST elevation myocardial infarction (STEMI) involving left anterior descending (LAD) coronary artery (HCC)        CAD (coronary artery disease)              Current Status Today According to the patient:  \"0\"    Subjective:  Mr. Ghada Young is generally feeling unchanged. Remains on vent    Mr. Ghada Young has the following cardiac complaints / symptoms today:    1. dyspnea, on vent    2. CAD, eventual cath    3.  Hypertension    The blood pressure for the lastr 36 hours has been:  Systolic (65DGA), WLS:720 , Min:100 , GBR:910    Diastolic (19KZZ), WUM:97, Min:55, Jorge Koroma is a 81 y.o. male with the following history as [facility] in [facility]:    Patient Active Problem List    Diagnosis Date Noted    Palliative care patient 03/12/2020     Priority: Low    [facility]ST [facility] 03/11/2020     Priority: Low    COPD exacerbation ([facility] 04/18/2019     Priority: Low    Hypertension 04/18/2019     Priority: Low    Syncope and collapse 04/17/2019     Priority: Low    Chest pain 11/14/2018     Priority: Low    STEMI (ST elevation myocardial infarction) ([facility] 06/05/2018     Priority: Low    CAD (coronary artery disease) 06/05/2018    Acute ST elevation myocardial infarction (STEMI) involving left anterior descending (LAD) coronary artery (HCC)     Electronic cigarette use      Current Facility-Administered Medications   Medication Dose Route Frequency Provider Last Rate Last Dose    potassium chloride 20 mEq/50 mL IVPB (Central Line)  20 mEq Intravenous PRN Annamarie Postin, DO 50 mL/hr at 03/18/20 1058 20 mEq at 03/18/20 1058    lidocaine PF 1 % injection 5 mL  5 mL Intradermal Once Krzysztof Sanchez MD        sodium chloride flush 0.9 % injection 10 mL  10 mL Intravenous 2 times per day Krzysztof Sanchez MD   10 mL at 03/19/20 1008    sodium chloride flush 0.9 % injection 10 mL  10 mL Intravenous PRN Krzysztof Sanchez MD        metoprolol tartrate (LOPRESSOR) tablet 12.5 mg  12.5 mg Oral BID Marycruz Louis MD   12.5 mg at 03/20/20 5184    morphine injection 2 mg  2 mg Intravenous Q1H PRN Sadia Richter MD   2 mg at 03/19/20 1513    pantoprazole (PROTONIX) injection 40 mg  40 mg Intravenous Daily Krzysztof Sanchez MD   40 mg at 03/20/20 5515    insulin lispro (HUMALOG) injection vial 0-6 Units  0-6 Units Subcutaneous TID WC Krzysztof Sanchez MD        insulin lispro (HUMALOG) injection vial 0-3 Units  0-3 Units Subcutaneous Nightly Krzysztof Sanchez MD   1 Units at 03/11/20 2211    aspirin chewable tablet 81 mg 81 mg Oral Daily Mario Melendez MD   81 mg at 03/20/20 1745    atorvastatin (LIPITOR) tablet 40 mg  40 mg Oral Nightly Mario Melendez MD   40 mg at 03/19/20 2046    glucose (GLUTOSE) 40 % oral gel 15 g  15 g Oral PRN Mario Melendez MD        dextrose 50 % IV solution  12.5 g Intravenous PRN Mario Melendez MD        glucagon (rDNA) injection 1 mg  1 mg Intramuscular PRN Mario Melendez MD        dextrose 5 % solution  100 mL/hr Intravenous PRN Mario Melendez MD        ipratropium-albuterol (DUONEB) nebulizer solution 1 ampule  1 ampule Inhalation Q4H WA Mario Melendez MD   1 ampule at 03/20/20 1019    piperacillin-tazobactam (ZOSYN) 3.375 g in dextrose 5 % 50 mL IVPB extended infusion (mini-bag)  3.375 g Intravenous Paramjit Forrest MD   Stopped at 03/20/20 1004    heparin (porcine) injection 4,000 Units  4,000 Units Intravenous PRN Mario Melendez MD   4,000 Units at 03/15/20 0618    heparin (porcine) injection 2,000 Units  2,000 Units Intravenous PRN Mario Melendez MD        heparin 25,000 units in dextrose 5% 250 mL infusion  10 Units/kg/hr Intravenous Continuous Mario Melendez MD 7.5 mL/hr at 03/20/20 0350 7 Units/kg/hr at 03/20/20 0350    propofol injection  10 mcg/kg/min Intravenous Titrated Evy Wolff MD 25.7 mL/hr at 03/20/20 0250 40 mcg/kg/min at 03/20/20 0250     Allergies: Patient has no known allergies.   Past Medical History:   Diagnosis Date    CAD (coronary artery disease)     Chest pain 11/14/2018    CHF (congestive heart failure) (HCC)     COPD (chronic obstructive pulmonary disease) (HCC)     Hyperlipidemia     Hypertension     Palliative care patient 03/12/2020    Pneumonia      Past Surgical History:   Procedure Laterality Date    APPENDECTOMY      BACK SURGERY      COLONOSCOPY      ENDOSCOPY, COLON, DIAGNOSTIC       Family History   Problem Relation Age of Onset   [employer] Cancer Sister     Cancer Brother      Social History     Tobacco Use    Smoking status: ASSESSMENT:    ALL THE CARDIOLOGY PROBLEMS ARE LISTED ABOVE; HOWEVER, THE FOLLOWING SPECIFIC CARDIAC PROBLEMS / CONDITIONS WERE ADDRESSED AND TREATED DURING THE OFFICE VISIT TODAY:                                                                                            MEDICAL DECISION MAKING                   Cardiac Specific Problem / Diagnosis   Discussion and Data Reviewed Diagnostic Procedures Ordered Management Options Selected                 1. Presenting problem / symptom     Probable non Q wave MI with resulting acute respiratory failure  show no change    The patient is a possible candidate for a re cath. Alivia Barone reviewed his films today and I did place stents I the LAD at the time of his anterior MI. Holden Nolan were bare metal stents.  It is possible that they could have restenosed or his had disease progression in the other diffusely diseased arteries.   No Continue current medications:      Yes:                  2. CAD Initial presentation during this evaluation    Review and summation of old records:     2000 stents  6/5/18  Anterior STEMI, AUC indication 2, AUC score 7  6/5/18  Cath 99% mid LAD, 2.5 x 18 and 2.5 x 28 BMS, diffuse CAD, anterior lateral dyskinesis, EF 40%  3/13/2020  Echo  Normal LVFX    No Continue current medications:     Yes:                  3.  Concern regarding systemic blood pressure Initial presentation during this evaluation The blood pressure for the lastr 36 hours has been:  Systolic (13APX), BQH:530 , Min:100 , TVO:536    Diastolic (04QIH), MWQ:40, Min:55, Max:82             No Continue current medications:        yes         Summary of hospital course thus far:        Date Clinical Event Plan of Treatment           03/16/20 Reviewed films from 6/5/2018 Discussion regarding goal of this clinical course   03/17/20    Awaiting weaning trial and decision on clinical course As noted   03/18/20    Weaning trial of the vent  Depends on clinical course    03/19/20    Weaning on the ventilator Hopefully cath when off vent    03/20/20     On vent, wean trial Cath when off vent                           PLAN:     1. Continue present medications except for changes as noted above  2. Continue to monitor rhythm  3.  Further orders per clinical course.        Discussed with nursing.     Electronically signed by Derrell Doe MD on 3/20/2020  [facility]

## 2020-03-20 NOTE — PROGRESS NOTES
Palliative Care Progress Note  3/20/2020 2:00 PM  Subjective:   Admit Date: 3/11/2020  PCP: Geronimo Sacks, MD    Chief Complaint: Intubated, mechanically ventilated    Interval History: Patient remains in CCU intubated, sedated, mechanically ventilated. No major changes, SBT's have been attempted, patient becomes agitated when sedation is weaned. Florin Quintanilla He continues to be followed by pulmonology and cardiology. Review of Systems   Unable to obtain, intubated and mechanically ventilated    DIET TUBE FEED CONTINUOUS/CYCLIC NPO; Low Calorie High Protein (Vital Hi Pro);  Nasogastric; Continuous; 20; 20    Medications:   dextrose      heparin (porcine) 7 Units/kg/hr (03/20/20 0350)    propofol 40 mcg/kg/min (03/20/20 0250)     Current Facility-Administered Medications   Medication Dose Route Frequency Provider Last Rate Last Dose    potassium chloride 20 mEq/50 mL IVPB (Central Line)  20 mEq Intravenous PRN Sulema Bruno DO 50 mL/hr at 03/18/20 1058 20 mEq at 03/18/20 1058    lidocaine PF 1 % injection 5 mL  5 mL Intradermal Once Maria Luisa Rice MD        sodium chloride flush 0.9 % injection 10 mL  10 mL Intravenous 2 times per day Maria Luisa Rice MD   10 mL at 03/19/20 1008    sodium chloride flush 0.9 % injection 10 mL  10 mL Intravenous PRN Maria Luisa Rice MD        metoprolol tartrate (LOPRESSOR) tablet 12.5 mg  12.5 mg Oral BID Brooke Barrientos MD   12.5 mg at 03/20/20 9767    morphine injection 2 mg  2 mg Intravenous Q1H PRN Kristina Cotter MD   2 mg at 03/19/20 1513    pantoprazole (PROTONIX) injection 40 mg  40 mg Intravenous Daily Maria Luisa Rice MD   40 mg at 03/20/20 0510    insulin lispro (HUMALOG) injection vial 0-6 Units  0-6 Units Subcutaneous TID WC Maria Luisa Rice MD        insulin lispro (HUMALOG) injection vial 0-3 Units  0-3 Units Subcutaneous Nightly Maria Luisa Rice MD   1 Units at 03/11/20 2211    aspirin chewable tablet 81 mg  81 mg Oral Daily Maria Luisa Rice MD   81 mg at 03/20/20 9992    atorvastatin (LIPITOR) tablet 40 mg  40 mg Oral Nightly Norberta Gottron, MD   40 mg at 03/19/20 2046    glucose (GLUTOSE) 40 % oral gel 15 g  15 g Oral PRN Norberta Gottron, MD        dextrose 50 % IV solution  12.5 g Intravenous PRN Norberta Gottron, MD        glucagon (rDNA) injection 1 mg  1 mg Intramuscular PRN Norberta Gottron, MD        dextrose 5 % solution  100 mL/hr Intravenous PRN Norberta Gottron, MD        ipratropium-albuterol (DUONEB) nebulizer solution 1 ampule  1 ampule Inhalation Q4H WA Norberta Gottron, MD   1 ampule at 03/20/20 1019    piperacillin-tazobactam (ZOSYN) 3.375 g in dextrose 5 % 50 mL IVPB extended infusion (mini-bag)  3.375 g Intravenous Gonzalo Nguyen MD   Stopped at 03/20/20 1004    heparin (porcine) injection 4,000 Units  4,000 Units Intravenous PRN Norberta Gottron, MD   4,000 Units at 03/15/20 0618    heparin (porcine) injection 2,000 Units  2,000 Units Intravenous PRN Norberta Gottron, MD        heparin 25,000 units in dextrose 5% 250 mL infusion  10 Units/kg/hr Intravenous Continuous Norberta Gottron, MD 7.5 mL/hr at 03/20/20 0350 7 Units/kg/hr at 03/20/20 0350    propofol injection  10 mcg/kg/min Intravenous Titrated Migdalia Cordova MD 25.7 mL/hr at 03/20/20 0250 40 mcg/kg/min at 03/20/20 0250        Labs:     Recent Labs     03/18/20  0415 03/19/20  0410 03/20/20  0310   WBC 8.0 7.6 6.4   RBC 3.83* 3.60* 3.38*   HGB 10.9* 10.2* 9.6*   HCT 34.6* 32.5* 30.4*   MCV 90.3 90.3 89.9   MCH 28.5 28.3 28.4   MCHC 31.5* 31.4* 31.6*    158 160     Recent Labs     03/18/20  0415  03/19/20  0410 03/19/20  0446 03/20/20 0310 03/20/20 0432     --  141  --  138  --    K 3.1*   < > 3.5 3.4 3.5 3.4   ANIONGAP 14  --  11  --  12  --      --  104  --  100  --    CO2 26  --  26  --  26  --    BUN 26*  --  20  --  21  --    CREATININE 0.7  --  0.7  --  0.7  --    GLUCOSE 89  --  102  --  102  --    CALCIUM 8.1*  --  8.2*  --  8.1*  --     < > = values in this Active Problems:    CAD (coronary artery disease)    COPD exacerbation (HCC)    Hypertension    NSTEMI (non-ST elevated myocardial infarction) [facility])    Palliative care patient  Resolved Problems:    * No resolved hospital problems. *        Visit Summary: Patient seen at the bedside, he does have agitation with weaning from sedation. Nursing staff continues to attempt SBT's. Patient is day 9 on the vent and discussions regarding whether to pursue aggressive measures, whether family would want patient reintubated if extubation was unsuccessful, or move toward hospice care need to be explored. Nursing staff has informed me that the patient's spouse has stated that she would not want to pursue trach/PEG or to have patient reintubated and I attempted to call her to confirm this information but was unable to get through to the patient's spouse. I attempted to call 3 times without success. I have passed on need to confirm this information to nursing staff in the event the patient spouse does call so plans can possibly be in place if patient is not able to be liberated from the vent. Palliative will continue to follow. Recommendations:  Continue to discuss goals dependent on his progress throughout hospitalization, if unable to liberate from vent would recommend hospice care    Thank you for consulting Palliative Care and allowing us to participate in the care of this patient.        Electronically signed by CHALO Hawthorne on 3/20/2020 at 2:00 PM    (Please note that portions of this note were completed with a voice recognition program.  López Parker made to edit the dictations but occasionally words are mis-transcribed.)

## 2020-03-20 NOTE — PROGRESS NOTES
Pulmonary and Critical Care Progress Note Jaime Bashir   MR# [id number]  Acct# [de-identified]  3/20/2020   9:56 AM CDT   Referring Provider: Arlie Fleischer, DO  Chief Complaint: Mechanically ventilated    HPI: Patient remains sedated and mechanically ventilated. Remains on a heparin and propofol drip. SCDs in place. Failed weaning trial yesterday. He is actively breathing over the ventilator. Saturation 98% on a PEEP of 5 and FiO2 0.30. Asher and rectal tube remain in place. ABGs reviewed and stable. CXR stable. No other issues. No family present. He is receiving a potassium run this am for potassium of 3.5. Past Medical History  Past Medical History:   Diagnosis Date    CAD (coronary artery disease)     Chest pain 11/14/2018    [facility]est[facility]     COPD (chronic obstructive pulmonary disease) ([facility]     Hyperlipidemia     Hypertension     Palliative care patient 03/12/2020    Pneumonia      Surgical History  Past Surgical History:   Procedure Laterality Date    APPENDECTOMY      BACK SURGERY      COLONOSCOPY      ENDOSCOPY, COLON, DIAGNOSTIC       Allergies  No Known Allergies  Medications  lidocaine 1 % injection, 5 mL, Intradermal, Once    sodium chloride flush, 10 mL, Intravenous, 2 times per day    metoprolol tartrate, 12.5 mg, Oral, BID    pantoprazole, 40 mg, Intravenous, Daily    insulin lispro, 0-6 Units, Subcutaneous, TID WC    insulin lispro, 0-3 Units, Subcutaneous, Nightly    aspirin, 81 mg, Oral, Daily    atorvastatin, 40 mg, Oral, Nightly    ipratropium-albuterol, 1 ampule, Inhalation, Q4H WA    piperacillin-tazobactam, 3.375 g, Intravenous, Q8H   Social History   reports that he quit smoking about 21 months ago. His smoking use included cigarettes. He has a 50.00 pack-year smoking history. His smokeless tobacco use includes chew. He reports previous alcohol use. He reports that he does not use drugs.   Family Neurological:      Mental Status: He is lethargic. Comments: Intubated and sedated, moving all extremities, agitated with wean of propofol        Recent laboratory:  Recent Labs     03/18/20 0415 03/19/20 0410 03/20/20 0310   WBC 8.0 7.6 6.4   RBC 3.83* 3.60* 3.38*   HGB 10.9* 10.2* 9.6*   HCT 34.6* 32.5* 30.4*    158 160   MCV 90.3 90.3 89.9   MCH 28.5 28.3 28.4   MCHC 31.5* 31.4* 31.6*   RDW 17.0* 17.0* 17.0*      Recent Labs     03/18/20 0415 03/19/20 0410 03/19/20 0446 03/20/20 0310 03/20/20 0432     --  141  --  138  --    K 3.1*   < > 3.5 3.4 3.5 3.4     --  104  --  100  --    CO2 26  --  26  --  26  --    BUN 26*  --  20  --  21  --    CREATININE 0.7  --  0.7  --  0.7  --    CALCIUM 8.1*  --  8.2*  --  8.1*  --    GLUCOSE 89  --  102  --  102  --     < > = values in this interval not displayed. Recent Labs     03/18/20 0431 03/19/20 0446 03/20/20 0432   PHART 7.470* 7.440 7.410   HBK5BYT 40.0 44.0 47.0*   PO2ART 77.0* 82.0 77.0*   YDN0BYR 29.1* 29.9* 29.8*   M2PMDNDY 94.7 95.2 94.4   BEART 5.0* 5.1* 4.5*     Recent Labs     03/18/20 0415 03/20/20 0310   AST 25   < > 17   ALT 31   < > 21   ALKPHOS 51   < > 49   BILITOT 0.5   < > 0.5   MG 2.3   < > 2.1   CALCIUM 8.1*   < > 8.1*   INR 1.03  --   --     < > = values in this interval not displayed. Radiograph:  Frontal supine radiograph of the chest 3/17/2020 5:00 AM   History: Intubation   Comparison: Chest x-ray dated 3/16/2020    Findings:    Lines and tubes are stable in position.  No new opacities or   pneumothoraces are visualized in the chest. The cardiomediastinal   silhouette and pulmonary vascularity are unchanged.     No acute osseous or soft tissue abnormality is noted.        Impression   Impression:    1.   No significant interval change since previous exam.   Signed by Dr Sherrell Garcia on 3/17/2020 7:27 AM     My radiograph interpretation/independent review of imaging: Endotracheal tube in good discussed with APRN above. Agree with findings and assessment/plan as documented above.     Electronically signed by Alexander Bowman on 3/20/2020, 9:33 PM

## 2020-03-21 ENCOUNTER — APPOINTMENT (OUTPATIENT)
Dept: GENERAL RADIOLOGY | Age: 70
DRG: 207 | End: 2020-03-21
Attending: INTERNAL MEDICINE
Payer: MEDICARE

## 2020-03-21 ENCOUNTER — OUTSIDE FACILITY SERVICE (OUTPATIENT)
Dept: PULMONOLOGY | Facility: CLINIC | Age: 70
End: 2020-03-21

## 2020-03-21 LAB
ALBUMIN SERPL-MCNC: 2.8 G/DL (ref 3.5–5.2)
ALP BLD-CCNC: 58 U/L (ref 40–130)
ALT SERPL-CCNC: 21 U/L (ref 5–41)
ANION GAP SERPL CALCULATED.3IONS-SCNC: 10 MMOL/L (ref 7–19)
APTT: 55.1 SEC (ref 26–36.2)
APTT: 60.9 SEC (ref 26–36.2)
APTT: 61.2 SEC (ref 26–36.2)
APTT: 63.1 SEC (ref 26–36.2)
AST SERPL-CCNC: 21 U/L (ref 5–40)
BASE EXCESS ARTERIAL: 4 MMOL/L (ref -2–2)
BASOPHILS ABSOLUTE: 0 K/UL (ref 0–0.2)
BASOPHILS RELATIVE PERCENT: 0.5 % (ref 0–1)
BILIRUB SERPL-MCNC: 0.5 MG/DL (ref 0.2–1.2)
BUN BLDV-MCNC: 15 MG/DL (ref 8–23)
CALCIUM SERPL-MCNC: 8.4 MG/DL (ref 8.8–10.2)
CARBOXYHEMOGLOBIN ARTERIAL: 2.3 % (ref 0–5)
CHLORIDE BLD-SCNC: 101 MMOL/L (ref 98–111)
CO2: 27 MMOL/L (ref 22–29)
CREAT SERPL-MCNC: 0.6 MG/DL (ref 0.5–1.2)
EOSINOPHILS ABSOLUTE: 0.4 K/UL (ref 0–0.6)
EOSINOPHILS RELATIVE PERCENT: 6.2 % (ref 0–5)
GFR NON-AFRICAN AMERICAN: >60
GLUCOSE BLD-MCNC: 102 MG/DL (ref 70–99)
GLUCOSE BLD-MCNC: 105 MG/DL (ref 70–99)
GLUCOSE BLD-MCNC: 98 MG/DL (ref 74–109)
HCO3 ARTERIAL: 29.2 MMOL/L (ref 22–26)
HCT VFR BLD CALC: 32.3 % (ref 42–52)
HEMOGLOBIN, ART, EXTENDED: 10.2 G/DL (ref 14–18)
HEMOGLOBIN: 10.1 G/DL (ref 14–18)
IMMATURE GRANULOCYTES #: 0.1 K/UL
LYMPHOCYTES ABSOLUTE: 0.9 K/UL (ref 1.1–4.5)
LYMPHOCYTES RELATIVE PERCENT: 13.8 % (ref 20–40)
MAGNESIUM: 2.1 MG/DL (ref 1.6–2.4)
MCH RBC QN AUTO: 28.6 PG (ref 27–31)
MCHC RBC AUTO-ENTMCNC: 31.3 G/DL (ref 33–37)
MCV RBC AUTO: 91.5 FL (ref 80–94)
METHEMOGLOBIN ARTERIAL: 0.5 %
MONOCYTES ABSOLUTE: 0.5 K/UL (ref 0–0.9)
MONOCYTES RELATIVE PERCENT: 7 % (ref 0–10)
NEUTROPHILS ABSOLUTE: 4.7 K/UL (ref 1.5–7.5)
NEUTROPHILS RELATIVE PERCENT: 71.6 % (ref 50–65)
O2 CONTENT ARTERIAL: 13.5 ML/DL
O2 SAT, ARTERIAL: 94 %
O2 THERAPY: ABNORMAL
PCO2 ARTERIAL: 46 MMHG (ref 35–45)
PDW BLD-RTO: 17 % (ref 11.5–14.5)
PERFORMED ON: ABNORMAL
PERFORMED ON: ABNORMAL
PH ARTERIAL: 7.41 (ref 7.35–7.45)
PLATELET # BLD: 185 K/UL (ref 130–400)
PMV BLD AUTO: 9.8 FL (ref 9.4–12.4)
PO2 ARTERIAL: 70 MMHG (ref 80–100)
POTASSIUM SERPL-SCNC: 4 MMOL/L (ref 3.5–5)
POTASSIUM, WHOLE BLOOD: 4.1
RBC # BLD: 3.53 M/UL (ref 4.7–6.1)
SODIUM BLD-SCNC: 138 MMOL/L (ref 136–145)
TOTAL PROTEIN: 6.2 G/DL (ref 6.6–8.7)
WBC # BLD: 6.6 K/UL (ref 4.8–10.8)

## 2020-03-21 PROCEDURE — 6370000000 HC RX 637 (ALT 250 FOR IP): Performed by: INTERNAL MEDICINE

## 2020-03-21 PROCEDURE — 85730 THROMBOPLASTIN TIME PARTIAL: CPT

## 2020-03-21 PROCEDURE — 82947 ASSAY GLUCOSE BLOOD QUANT: CPT

## 2020-03-21 PROCEDURE — 80053 COMPREHEN METABOLIC PANEL: CPT

## 2020-03-21 PROCEDURE — 2580000003 HC RX 258: Performed by: INTERNAL MEDICINE

## 2020-03-21 PROCEDURE — 36592 COLLECT BLOOD FROM PICC: CPT

## 2020-03-21 PROCEDURE — 82803 BLOOD GASES ANY COMBINATION: CPT

## 2020-03-21 PROCEDURE — 6360000002 HC RX W HCPCS: Performed by: INTERNAL MEDICINE

## 2020-03-21 PROCEDURE — 83735 ASSAY OF MAGNESIUM: CPT

## 2020-03-21 PROCEDURE — 2100000000 HC CCU R&B

## 2020-03-21 PROCEDURE — 6360000002 HC RX W HCPCS: Performed by: HOSPITALIST

## 2020-03-21 PROCEDURE — 71045 X-RAY EXAM CHEST 1 VIEW: CPT

## 2020-03-21 PROCEDURE — C9113 INJ PANTOPRAZOLE SODIUM, VIA: HCPCS | Performed by: INTERNAL MEDICINE

## 2020-03-21 PROCEDURE — 84132 ASSAY OF SERUM POTASSIUM: CPT

## 2020-03-21 PROCEDURE — 36600 WITHDRAWAL OF ARTERIAL BLOOD: CPT

## 2020-03-21 PROCEDURE — 94003 VENT MGMT INPAT SUBQ DAY: CPT

## 2020-03-21 PROCEDURE — 99233 SBSQ HOSP IP/OBS HIGH 50: CPT | Performed by: INTERNAL MEDICINE

## 2020-03-21 PROCEDURE — 85025 COMPLETE CBC W/AUTO DIFF WBC: CPT

## 2020-03-21 PROCEDURE — 94640 AIRWAY INHALATION TREATMENT: CPT

## 2020-03-21 PROCEDURE — 2700000000 HC OXYGEN THERAPY PER DAY

## 2020-03-21 RX ADMIN — ASPIRIN 81 MG 81 MG: 81 TABLET ORAL at 09:01

## 2020-03-21 RX ADMIN — IPRATROPIUM BROMIDE AND ALBUTEROL SULFATE 1 AMPULE: .5; 3 SOLUTION RESPIRATORY (INHALATION) at 06:38

## 2020-03-21 RX ADMIN — PROPOFOL 45 MCG/KG/MIN: 10 INJECTION, EMULSION INTRAVENOUS at 21:30

## 2020-03-21 RX ADMIN — SODIUM CHLORIDE, PRESERVATIVE FREE 10 ML: 5 INJECTION INTRAVENOUS at 20:19

## 2020-03-21 RX ADMIN — IPRATROPIUM BROMIDE AND ALBUTEROL SULFATE 1 AMPULE: .5; 3 SOLUTION RESPIRATORY (INHALATION) at 14:38

## 2020-03-21 RX ADMIN — PROPOFOL 45 MCG/KG/MIN: 10 INJECTION, EMULSION INTRAVENOUS at 17:51

## 2020-03-21 RX ADMIN — HEPARIN SODIUM 8 UNITS/KG/HR: 10000 INJECTION, SOLUTION INTRAVENOUS at 14:11

## 2020-03-21 RX ADMIN — MORPHINE SULFATE 2 MG: 4 INJECTION INTRAVENOUS at 13:50

## 2020-03-21 RX ADMIN — SODIUM CHLORIDE, PRESERVATIVE FREE 10 ML: 5 INJECTION INTRAVENOUS at 09:01

## 2020-03-21 RX ADMIN — PROPOFOL 45 MCG/KG/MIN: 10 INJECTION, EMULSION INTRAVENOUS at 14:16

## 2020-03-21 RX ADMIN — IPRATROPIUM BROMIDE AND ALBUTEROL SULFATE 1 AMPULE: .5; 3 SOLUTION RESPIRATORY (INHALATION) at 10:40

## 2020-03-21 RX ADMIN — ATORVASTATIN CALCIUM 40 MG: 40 TABLET, FILM COATED ORAL at 20:19

## 2020-03-21 RX ADMIN — MORPHINE SULFATE 2 MG: 4 INJECTION INTRAVENOUS at 11:59

## 2020-03-21 RX ADMIN — IPRATROPIUM BROMIDE AND ALBUTEROL SULFATE 1 AMPULE: .5; 3 SOLUTION RESPIRATORY (INHALATION) at 18:18

## 2020-03-21 RX ADMIN — PROPOFOL 45 MCG/KG/MIN: 10 INJECTION, EMULSION INTRAVENOUS at 04:36

## 2020-03-21 RX ADMIN — PANTOPRAZOLE SODIUM 40 MG: 40 INJECTION, POWDER, FOR SOLUTION INTRAVENOUS at 09:01

## 2020-03-21 RX ADMIN — PROPOFOL 35 MCG/KG/MIN: 10 INJECTION, EMULSION INTRAVENOUS at 09:01

## 2020-03-21 RX ADMIN — METOPROLOL TARTRATE 12.5 MG: 25 TABLET, FILM COATED ORAL at 09:01

## 2020-03-21 RX ADMIN — MORPHINE SULFATE 2 MG: 4 INJECTION INTRAVENOUS at 04:02

## 2020-03-21 RX ADMIN — METOPROLOL TARTRATE 12.5 MG: 25 TABLET, FILM COATED ORAL at 20:18

## 2020-03-21 RX ADMIN — MORPHINE SULFATE 2 MG: 4 INJECTION INTRAVENOUS at 23:56

## 2020-03-21 ASSESSMENT — PULMONARY FUNCTION TESTS
PIF_VALUE: 47
PIF_VALUE: 19
PIF_VALUE: 26
PIF_VALUE: 37
PIF_VALUE: 39
PIF_VALUE: 29
PIF_VALUE: 29
PIF_VALUE: 26
PIF_VALUE: 27
PIF_VALUE: 29
PIF_VALUE: 46
PIF_VALUE: 55
PIF_VALUE: 52
PIF_VALUE: 49
PIF_VALUE: 41
PIF_VALUE: 46
PIF_VALUE: 34
PIF_VALUE: 28
PIF_VALUE: 37
PIF_VALUE: 35
PIF_VALUE: 37
PIF_VALUE: 38
PIF_VALUE: 29
PIF_VALUE: 28
PIF_VALUE: 27
PIF_VALUE: 75
PIF_VALUE: 54
PIF_VALUE: 42
PIF_VALUE: 43
PIF_VALUE: 35
PIF_VALUE: 49

## 2020-03-21 ASSESSMENT — PAIN SCALES - GENERAL
PAINLEVEL_OUTOF10: 0
PAINLEVEL_OUTOF10: 5
PAINLEVEL_OUTOF10: 0
PAINLEVEL_OUTOF10: 4
PAINLEVEL_OUTOF10: 0
PAINLEVEL_OUTOF10: 7
PAINLEVEL_OUTOF10: 0
PAINLEVEL_OUTOF10: 5

## 2020-03-21 NOTE — PROGRESS NOTES
3/21/2020  4:31 AM - Marielos Ferreira Incoming Lab Results From [employer] Value Ref Range & Units Status Collected Lab   pH, Arterial 7.410  7.350 - 7.450 Final 03/21/2020  4:30 AM [facility] Lab   pCO2, Arterial 46. 0High   35.0 - 45.0 mmHg Final 03/21/2020  4:30 AM [facility] Lab   pO2, Arterial 70. 0Low   80.0 - 100.0 mmHg Final 03/21/2020  4:30 AM [facility] Lab   HCO3, Arterial 29. 2High   22.0 - 26.0 mmol/L Final 03/21/2020  4:30 AM [facility] Excess, Arterial 4.0High   -2.0 - 2.0 mmol/L Final 03/21/2020  4:30 AM [facility] Lab   Hemoglobin, Art, Extended 10.2Low   14.0 - 18.0 g/dL Final 03/21/2020  4:30 AM [facility] Lab   O2 Sat, Arterial 94.0  >92 % Final 03/21/2020  4:30 AM [facility] Lab   Carboxyhgb, Arterial 2.3  0.0 - 5.0 % Final 03/21/2020  4:30 AM [facility] Lab        0.0-1.5   (Smokers 1.5-5.0)    Methemoglobin, Arterial 0.5  <1.5 % Final 03/21/2020  4:30 AM [facility] Lab   O2 Content, Arterial 13.5  Not Established mL/dL Final 03/21/2020  4:30 AM [facility] Lab   O2 Therapy Unknown   Final 03/21/2020  4:30 AM [facility] [address] Performed By     [address] Name Director Address Valid Date Range   [address] LAB Izzy Ronquillo  [place],[address]  [address] [facility] [address] 08/30/17 0733-Present   Lab and Collection     Blood Gas, Arterial - 3/21/2020   Result History     VC 14, , peep +5, 30%, Lt. Rad.  AT unable

## 2020-03-21 NOTE — PROGRESS NOTES
LOS: 10 days   Patient Care Team:  Lexis Saenz MD as PCP - Stacia Lindsey MD as Consulting Physician (Cardiology)    Chief Complaint:  Follow-up respiratory failure, mechanical ventilation, pneumonia, COPD and medical problems listed below    Interval History:     I reviewed the prior medical records including the consultation note, PMH, PSH, family history and social history and summarized the records in my note to formalize a transition of care plan. Has mild secretions from the ET tube, slightly yellowish. Temperature up to 99 today. Weaning was attempted but patient was agitated and breathing so fast so it was aborted. On tube feeding and tolerating well. Has an FMS. Patient is sedated with propofol 40 mics per KG per minute and unable to provide with history. REVIEW OF SYSTEMS:   Cannot obtain due to mechanical ventilation. Vital Signs   height is 6' (1.829 m) and weight is 220 lb 8 oz (100 kg). His axillary temperature is 99.7 °F (37.6 °C). His blood pressure is 114/67 and his pulse is 81. His respiration is 17 and oxygen saturation is 94%. Intake/Output Summary (Last 24 hours) at 3/21/2020 1720  Last data filed at 3/21/2020 1707  Gross per 24 hour   Intake 1871.11 ml   Output 2325 ml   Net -453.89 ml       Physical Exam:   General Appearance:   Sedated, on mechanical ventilation. HEENT:  ET tube 7.5   Neck:   Trachea midline. No thyromegaly   Lungs:    Diminished breath sounds overall. Mild expiratory wheezing. No crackles. Nonlabored breathing on the ventilator    Heart:    Regular rhythm and normal rate, no murmur   Skin:    No rash but diffuse ecchymosis in arms. Abdomen:     Soft. No tenderness. No dullness. Positive bowel sounds   Neuro/psych:  Sedated. Does not follow commands when off sedation with propofol. Extremities:  Diffuse pitting edema in arms and legs. No clubbing.   Warm extremities and well-perfused          Results Review:    Recent Labs     03/19/20 0410 03/20/20 0310 03/21/20 0410   WBC 7.6 6.4 6.6   RBC 3.60* 3.38* 3.53*   HGB 10.2* 9.6* 10.1*   HCT 32.5* 30.4* 32.3*    160 185   MCV 90.3 89.9 91.5   MCH 28.3 28.4 28.6   MCHC 31.4* 31.6* 31.3*   RDW 17.0* 17.0* 17.0*      Recent Labs     03/19/20 0410 03/20/20 0310 03/20/20 0432 03/21/20 0410 03/21/20 0430     --  138  --  138  --    K 3.5   < > 3.5 3.4 4.0 4.1     --  100  --  101  --    CO2 26  --  26  --  27  --    BUN 20  --  21  --  15  --    CREATININE 0.7  --  0.7  --  0.6  --    CALCIUM 8.2*  --  8.1*  --  8.4*  --    GLUCOSE 102  --  102  --  98  --     < > = values in this interval not displayed. Recent Labs     03/19/20 0446 03/20/20 0432 03/21/20 0430   PHART 7.440 7.410 7.410   CBB1SGT 44.0 47.0* 46.0*   PO2ART 82.0 77.0* 70.0*   IHR6BEP 29.9* 29.8* 29.2*   G9KZDVAP 95.2 94.4 94.0   BEART 5.1* 4.5* 4.0*     Recent Labs     03/21/20 0410   AST 21   ALT 21   ALKPHOS 58   BILITOT 0.5   MG 2.1   CALCIUM 8.4*     No results for input(s): BC, LABGRAM, CULTRESP, BFCX in the last 72 hours. I reviewed the patient's new clinical results. I personally viewed and interpreted the patient's CXR        Medication Review:    lidocaine 1 % injection  5 mL Intradermal Once    sodium chloride flush  10 mL Intravenous 2 times per day    metoprolol tartrate  12.5 mg Oral BID    pantoprazole  40 mg Intravenous Daily    insulin lispro  0-6 Units Subcutaneous TID WC    insulin lispro  0-3 Units Subcutaneous Nightly    aspirin  81 mg Oral Daily    atorvastatin  40 mg Oral Nightly    ipratropium-albuterol  1 ampule Inhalation Q4H WA        dextrose      heparin (porcine) 8 Units/kg/hr (03/21/20 1411)    propofol 45 mcg/kg/min (03/21/20 1416)       Diagnostic imaging:  I personally and independently reviewed the following images:  XR 3/21/2020 compared to 3/20/2020:  Upper lobe emphysema.   Right hilar/lower lobe infiltrates. CT chest 3/13/2020:  Upper lobe predominant emphysema. Bilateral lower lobe nodular infiltrates. Assessment:  1. Acute on chronic hypoxic respiratory failure  2. RSV pneumonia  3. Stenotrophomonas pneumonia/colonization  4. NSTEMI  5. COPD exacerbation  6. Protein calorie malnutrition      Plan   · Mechanical ventilation management: Increased expiratory flow rate.   · Switch from propofol to Precedex tomorrow and perform spontaneous breathing trial.  · Continue DuoNeb  · Plan for cath per cardiology when respiratory status improves    Vesna Espinal  Pulmonary, Critical care and sleep medicine  03/21/20  5:20 PM            This note was dictated utilizing Dragon dictation

## 2020-03-21 NOTE — PROGRESS NOTES
(LIPITOR) tablet 40 mg  40 mg Oral Nightly Alan Jenkins MD   40 mg at 03/20/20 2021    glucose (GLUTOSE) 40 % oral gel 15 g  15 g Oral PRN Alan Jenkins MD        dextrose 50 % IV solution  12.5 g Intravenous PRN Alan Jenkins MD        glucagon (rDNA) injection 1 mg  1 mg Intramuscular PRN Alan Jenkins MD        dextrose 5 % solution  100 mL/hr Intravenous PRN Alan Jenkins MD        ipratropium-albuterol (DUONEB) nebulizer solution 1 ampule  1 ampule Inhalation Q4H Brandt Ferrara MD   1 ampule at 03/21/20 1040    heparin (porcine) injection 4,000 Units  4,000 Units Intravenous PRN Alan Jenkins MD   4,000 Units at 03/15/20 0618    heparin (porcine) injection 2,000 Units  2,000 Units Intravenous PRN Alan Jenkins MD        heparin 25,000 units in dextrose 5% 250 mL infusion  10 Units/kg/hr Intravenous Continuous Alan Jenkins MD 8.6 mL/hr at 03/20/20 2242 8 Units/kg/hr at 03/20/20 2242    propofol injection  10 mcg/kg/min Intravenous Titrated Bhavna Lepe MD 3.2 mL/hr at 03/21/20 1219 5 mcg/kg/min at 03/21/20 1219       Past Medical History:  Past Medical History:   Diagnosis Date    CAD (coronary artery disease)     Chest pain 11/14/2018    [facility] [address]     COPD [facility]chronic obstructive pulmonary disease) [facility] [address]     Hyperlipidemia     Hypertension     Palliative care patient 03/12/2020    Pneumonia        Past Surgical History:  Past Surgical History:   Procedure Laterality Date    APPENDECTOMY      BACK SURGERY      COLONOSCOPY      ENDOSCOPY, COLON, DIAGNOSTIC         Family History  Family History   Problem Relation Age of Onset    Cancer Sister     Cancer Brother        Social History  Social History     Socioeconomic History    Marital status:      Spouse name: Robin Villalobos    Number of children: 3    Years of education: 8    Highest education level: Not on file   Occupational History    Not on file   Social Needs    Financial resource strain: Not on file    Food insecurity     Worry: Not on file     Inability: Not on file    Transportation needs     Medical: Not on file     Non-medical: Not on file   Tobacco Use    Smoking status: Former Smoker     Packs/day: 1.00     Years: 50.00     Pack years: 50.00     Types: Cigarettes     Last attempt to quit: 2018     Years since quittin.7    Smokeless tobacco: Current User     Types: Chew   Substance and Sexual Activity    Alcohol use: Not Currently     Frequency: Never     Comment: occassional    Drug use: No    Sexual activity: Not on file   Lifestyle    Physical activity     Days per week: Not on file     Minutes per session: Not on file    Stress: Not on file   Relationships    Social connections     Talks on phone: Not on file     Gets together: Not on file     Attends Mu-ism service: Not on file     Active member of club or organization: Not on file     Attends meetings of clubs or organizations: Not on file     Relationship status: Not on file    Intimate partner violence     Fear of current or ex partner: Not on file     Emotionally abused: Not on file     Physically abused: Not on file     Forced sexual activity: Not on file   Other Topics Concern    Not on file   Social History Narrative     50 years only marriage    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 rate 27, height 6' (1.829 m), weight 220 lb 8 oz (100 kg), SpO2 96 %.     Intake/Output Summary (Last 24 hours) at 3/21/2020 1221  Last data filed at

## 2020-03-21 NOTE — PROGRESS NOTES
Pt placed on CPAP 5,PS 5 and 30% Fio2 for weaning trial. Pt tolerated for about 5 minutes. Pt respiratory rate increased to 35-40. SBI 80-90. Pt placed back on previous vent settings.

## 2020-03-22 ENCOUNTER — OUTSIDE FACILITY SERVICE (OUTPATIENT)
Dept: PULMONOLOGY | Facility: CLINIC | Age: 70
End: 2020-03-22

## 2020-03-22 ENCOUNTER — APPOINTMENT (OUTPATIENT)
Dept: GENERAL RADIOLOGY | Age: 70
DRG: 207 | End: 2020-03-22
Attending: INTERNAL MEDICINE
Payer: MEDICARE

## 2020-03-22 LAB
ALBUMIN SERPL-MCNC: 2.9 G/DL (ref 3.5–5.2)
ALP BLD-CCNC: 69 U/L (ref 40–130)
ALT SERPL-CCNC: 24 U/L (ref 5–41)
ANION GAP SERPL CALCULATED.3IONS-SCNC: 12 MMOL/L (ref 7–19)
APTT: 52 SEC (ref 26–36.2)
APTT: 62.8 SEC (ref 26–36.2)
APTT: 67.9 SEC (ref 26–36.2)
APTT: 81.6 SEC (ref 26–36.2)
AST SERPL-CCNC: 25 U/L (ref 5–40)
BACTERIA: NEGATIVE /HPF
BASE EXCESS ARTERIAL: 4.9 MMOL/L (ref -2–2)
BASOPHILS ABSOLUTE: 0 K/UL (ref 0–0.2)
BASOPHILS RELATIVE PERCENT: 0.4 % (ref 0–1)
BILIRUB SERPL-MCNC: 0.6 MG/DL (ref 0.2–1.2)
BILIRUBIN URINE: NEGATIVE
BLOOD, URINE: ABNORMAL
BUN BLDV-MCNC: 18 MG/DL (ref 8–23)
CALCIUM SERPL-MCNC: 8.3 MG/DL (ref 8.8–10.2)
CARBOXYHEMOGLOBIN ARTERIAL: 2.1 % (ref 0–5)
CHLORIDE BLD-SCNC: 98 MMOL/L (ref 98–111)
CLARITY: ABNORMAL
CO2: 27 MMOL/L (ref 22–29)
COLOR: ABNORMAL
CREAT SERPL-MCNC: 0.7 MG/DL (ref 0.5–1.2)
EOSINOPHILS ABSOLUTE: 0.3 K/UL (ref 0–0.6)
EOSINOPHILS RELATIVE PERCENT: 4.3 % (ref 0–5)
EPITHELIAL CELLS, UA: 0 /HPF (ref 0–5)
GFR NON-AFRICAN AMERICAN: >60
GLUCOSE BLD-MCNC: 107 MG/DL (ref 74–109)
GLUCOSE BLD-MCNC: 112 MG/DL (ref 70–99)
GLUCOSE BLD-MCNC: 126 MG/DL (ref 70–99)
GLUCOSE BLD-MCNC: 139 MG/DL (ref 70–99)
GLUCOSE URINE: NEGATIVE MG/DL
HCO3 ARTERIAL: 29.9 MMOL/L (ref 22–26)
HCT VFR BLD CALC: 33 % (ref 42–52)
HEMOGLOBIN, ART, EXTENDED: 11.4 G/DL (ref 14–18)
HEMOGLOBIN: 10.5 G/DL (ref 14–18)
HYALINE CASTS: 4 /HPF (ref 0–8)
IMMATURE GRANULOCYTES #: 0 K/UL
KETONES, URINE: NEGATIVE MG/DL
LEUKOCYTE ESTERASE, URINE: NEGATIVE
LYMPHOCYTES ABSOLUTE: 0.5 K/UL (ref 1.1–4.5)
LYMPHOCYTES RELATIVE PERCENT: 5.9 % (ref 20–40)
MAGNESIUM: 1.9 MG/DL (ref 1.6–2.4)
MCH RBC QN AUTO: 29.3 PG (ref 27–31)
MCHC RBC AUTO-ENTMCNC: 31.8 G/DL (ref 33–37)
MCV RBC AUTO: 92.2 FL (ref 80–94)
METHEMOGLOBIN ARTERIAL: 1.2 %
MONOCYTES ABSOLUTE: 0.3 K/UL (ref 0–0.9)
MONOCYTES RELATIVE PERCENT: 3.9 % (ref 0–10)
NEUTROPHILS ABSOLUTE: 6.6 K/UL (ref 1.5–7.5)
NEUTROPHILS RELATIVE PERCENT: 85.1 % (ref 50–65)
NITRITE, URINE: NEGATIVE
O2 CONTENT ARTERIAL: 15.3 ML/DL
O2 SAT, ARTERIAL: 94.8 %
O2 THERAPY: ABNORMAL
PCO2 ARTERIAL: 45 MMHG (ref 35–45)
PDW BLD-RTO: 16.5 % (ref 11.5–14.5)
PERFORMED ON: ABNORMAL
PH ARTERIAL: 7.43 (ref 7.35–7.45)
PH UA: 5.5 (ref 5–8)
PLATELET # BLD: 193 K/UL (ref 130–400)
PMV BLD AUTO: 9.5 FL (ref 9.4–12.4)
PO2 ARTERIAL: 79 MMHG (ref 80–100)
POTASSIUM SERPL-SCNC: 3.6 MMOL/L (ref 3.5–5)
POTASSIUM, WHOLE BLOOD: 3.5
PRO-BNP: 381 PG/ML (ref 0–900)
PROTEIN UA: ABNORMAL MG/DL
RBC # BLD: 3.58 M/UL (ref 4.7–6.1)
RBC UA: 543 /HPF (ref 0–4)
SODIUM BLD-SCNC: 137 MMOL/L (ref 136–145)
SPECIFIC GRAVITY UA: 1.01 (ref 1–1.03)
TOTAL PROTEIN: 6.6 G/DL (ref 6.6–8.7)
TROPONIN: <0.01 NG/ML (ref 0–0.03)
UROBILINOGEN, URINE: 0.2 E.U./DL
WBC # BLD: 7.8 K/UL (ref 4.8–10.8)
WBC UA: 4 /HPF (ref 0–5)

## 2020-03-22 PROCEDURE — 84484 ASSAY OF TROPONIN QUANT: CPT

## 2020-03-22 PROCEDURE — C9113 INJ PANTOPRAZOLE SODIUM, VIA: HCPCS | Performed by: INTERNAL MEDICINE

## 2020-03-22 PROCEDURE — 83880 ASSAY OF NATRIURETIC PEPTIDE: CPT

## 2020-03-22 PROCEDURE — 87086 URINE CULTURE/COLONY COUNT: CPT

## 2020-03-22 PROCEDURE — 6360000002 HC RX W HCPCS: Performed by: HOSPITALIST

## 2020-03-22 PROCEDURE — 85730 THROMBOPLASTIN TIME PARTIAL: CPT

## 2020-03-22 PROCEDURE — 94003 VENT MGMT INPAT SUBQ DAY: CPT

## 2020-03-22 PROCEDURE — 6360000002 HC RX W HCPCS: Performed by: INTERNAL MEDICINE

## 2020-03-22 PROCEDURE — 36415 COLL VENOUS BLD VENIPUNCTURE: CPT

## 2020-03-22 PROCEDURE — 6370000000 HC RX 637 (ALT 250 FOR IP): Performed by: INTERNAL MEDICINE

## 2020-03-22 PROCEDURE — 83735 ASSAY OF MAGNESIUM: CPT

## 2020-03-22 PROCEDURE — 36600 WITHDRAWAL OF ARTERIAL BLOOD: CPT

## 2020-03-22 PROCEDURE — 2580000003 HC RX 258: Performed by: HOSPITALIST

## 2020-03-22 PROCEDURE — 87040 BLOOD CULTURE FOR BACTERIA: CPT

## 2020-03-22 PROCEDURE — 99233 SBSQ HOSP IP/OBS HIGH 50: CPT | Performed by: INTERNAL MEDICINE

## 2020-03-22 PROCEDURE — 2500000003 HC RX 250 WO HCPCS

## 2020-03-22 PROCEDURE — 6370000000 HC RX 637 (ALT 250 FOR IP): Performed by: HOSPITALIST

## 2020-03-22 PROCEDURE — 36592 COLLECT BLOOD FROM PICC: CPT

## 2020-03-22 PROCEDURE — 2580000003 HC RX 258: Performed by: INTERNAL MEDICINE

## 2020-03-22 PROCEDURE — 2700000000 HC OXYGEN THERAPY PER DAY

## 2020-03-22 PROCEDURE — 94640 AIRWAY INHALATION TREATMENT: CPT

## 2020-03-22 PROCEDURE — 87070 CULTURE OTHR SPECIMN AEROBIC: CPT

## 2020-03-22 PROCEDURE — 85025 COMPLETE CBC W/AUTO DIFF WBC: CPT

## 2020-03-22 PROCEDURE — 87077 CULTURE AEROBIC IDENTIFY: CPT

## 2020-03-22 PROCEDURE — 82803 BLOOD GASES ANY COMBINATION: CPT

## 2020-03-22 PROCEDURE — 93005 ELECTROCARDIOGRAM TRACING: CPT | Performed by: INTERNAL MEDICINE

## 2020-03-22 PROCEDURE — 81001 URINALYSIS AUTO W/SCOPE: CPT

## 2020-03-22 PROCEDURE — 71045 X-RAY EXAM CHEST 1 VIEW: CPT

## 2020-03-22 PROCEDURE — 80053 COMPREHEN METABOLIC PANEL: CPT

## 2020-03-22 PROCEDURE — 84132 ASSAY OF SERUM POTASSIUM: CPT

## 2020-03-22 PROCEDURE — 82947 ASSAY GLUCOSE BLOOD QUANT: CPT

## 2020-03-22 PROCEDURE — 87205 SMEAR GRAM STAIN: CPT

## 2020-03-22 PROCEDURE — 87186 SC STD MICRODIL/AGAR DIL: CPT

## 2020-03-22 PROCEDURE — 2100000000 HC CCU R&B

## 2020-03-22 RX ORDER — FUROSEMIDE 10 MG/ML
40 INJECTION INTRAMUSCULAR; INTRAVENOUS ONCE
Status: COMPLETED | OUTPATIENT
Start: 2020-03-22 | End: 2020-03-22

## 2020-03-22 RX ORDER — FUROSEMIDE 10 MG/ML
40 INJECTION INTRAMUSCULAR; INTRAVENOUS 2 TIMES DAILY
Status: DISCONTINUED | OUTPATIENT
Start: 2020-03-22 | End: 2020-03-22

## 2020-03-22 RX ORDER — ACETAMINOPHEN 160 MG/5ML
650 SOLUTION ORAL EVERY 4 HOURS PRN
Status: DISCONTINUED | OUTPATIENT
Start: 2020-03-22 | End: 2020-03-27 | Stop reason: HOSPADM

## 2020-03-22 RX ORDER — NITROGLYCERIN 20 MG/100ML
INJECTION INTRAVENOUS
Status: COMPLETED
Start: 2020-03-22 | End: 2020-03-22

## 2020-03-22 RX ORDER — NITROGLYCERIN 20 MG/100ML
5 INJECTION INTRAVENOUS CONTINUOUS
Status: DISCONTINUED | OUTPATIENT
Start: 2020-03-22 | End: 2020-03-23

## 2020-03-22 RX ORDER — FUROSEMIDE 10 MG/ML
20 INJECTION INTRAMUSCULAR; INTRAVENOUS ONCE
Status: COMPLETED | OUTPATIENT
Start: 2020-03-22 | End: 2020-03-22

## 2020-03-22 RX ORDER — 0.9 % SODIUM CHLORIDE 0.9 %
500 INTRAVENOUS SOLUTION INTRAVENOUS ONCE
Status: COMPLETED | OUTPATIENT
Start: 2020-03-22 | End: 2020-03-22

## 2020-03-22 RX ADMIN — PANTOPRAZOLE SODIUM 40 MG: 40 INJECTION, POWDER, FOR SOLUTION INTRAVENOUS at 09:41

## 2020-03-22 RX ADMIN — MEROPENEM 1 G: 1 INJECTION, POWDER, FOR SOLUTION INTRAVENOUS at 13:54

## 2020-03-22 RX ADMIN — IPRATROPIUM BROMIDE AND ALBUTEROL SULFATE 1 AMPULE: .5; 3 SOLUTION RESPIRATORY (INHALATION) at 10:15

## 2020-03-22 RX ADMIN — ACETAMINOPHEN 650 MG: 325 SOLUTION ORAL at 05:04

## 2020-03-22 RX ADMIN — FUROSEMIDE 20 MG: 10 INJECTION, SOLUTION INTRAMUSCULAR; INTRAVENOUS at 00:41

## 2020-03-22 RX ADMIN — PROPOFOL 40 MCG/KG/MIN: 10 INJECTION, EMULSION INTRAVENOUS at 21:34

## 2020-03-22 RX ADMIN — PROPOFOL 45 MCG/KG/MIN: 10 INJECTION, EMULSION INTRAVENOUS at 00:10

## 2020-03-22 RX ADMIN — PROPOFOL 40 MCG/KG/MIN: 10 INJECTION, EMULSION INTRAVENOUS at 13:52

## 2020-03-22 RX ADMIN — MORPHINE SULFATE 2 MG: 4 INJECTION INTRAVENOUS at 00:57

## 2020-03-22 RX ADMIN — MORPHINE SULFATE 2 MG: 4 INJECTION INTRAVENOUS at 14:16

## 2020-03-22 RX ADMIN — NITROGLYCERIN 5 MCG/MIN: 20 INJECTION INTRAVENOUS at 02:15

## 2020-03-22 RX ADMIN — PROPOFOL 40 MCG/KG/MIN: 10 INJECTION, EMULSION INTRAVENOUS at 17:40

## 2020-03-22 RX ADMIN — MEROPENEM 1 G: 1 INJECTION, POWDER, FOR SOLUTION INTRAVENOUS at 20:35

## 2020-03-22 RX ADMIN — MORPHINE SULFATE 2 MG: 4 INJECTION INTRAVENOUS at 02:34

## 2020-03-22 RX ADMIN — METOPROLOL TARTRATE 12.5 MG: 25 TABLET, FILM COATED ORAL at 20:35

## 2020-03-22 RX ADMIN — PROPOFOL 50 MCG/KG/MIN: 10 INJECTION, EMULSION INTRAVENOUS at 07:12

## 2020-03-22 RX ADMIN — FUROSEMIDE 40 MG: 10 INJECTION, SOLUTION INTRAMUSCULAR; INTRAVENOUS at 01:38

## 2020-03-22 RX ADMIN — PROPOFOL 40 MCG/KG/MIN: 10 INJECTION, EMULSION INTRAVENOUS at 11:10

## 2020-03-22 RX ADMIN — PROPOFOL 50 MCG/KG/MIN: 10 INJECTION, EMULSION INTRAVENOUS at 03:43

## 2020-03-22 RX ADMIN — IPRATROPIUM BROMIDE AND ALBUTEROL SULFATE 1 AMPULE: .5; 3 SOLUTION RESPIRATORY (INHALATION) at 06:15

## 2020-03-22 RX ADMIN — SODIUM CHLORIDE, PRESERVATIVE FREE 10 ML: 5 INJECTION INTRAVENOUS at 09:41

## 2020-03-22 RX ADMIN — ATORVASTATIN CALCIUM 40 MG: 40 TABLET, FILM COATED ORAL at 20:35

## 2020-03-22 RX ADMIN — SODIUM CHLORIDE 500 ML: 9 INJECTION, SOLUTION INTRAVENOUS at 06:38

## 2020-03-22 RX ADMIN — ASPIRIN 81 MG 81 MG: 81 TABLET ORAL at 09:41

## 2020-03-22 RX ADMIN — VANCOMYCIN HYDROCHLORIDE 1750 MG: 10 INJECTION, POWDER, LYOPHILIZED, FOR SOLUTION INTRAVENOUS at 14:49

## 2020-03-22 RX ADMIN — IPRATROPIUM BROMIDE AND ALBUTEROL SULFATE 1 AMPULE: .5; 3 SOLUTION RESPIRATORY (INHALATION) at 18:15

## 2020-03-22 RX ADMIN — SODIUM CHLORIDE, PRESERVATIVE FREE 10 ML: 5 INJECTION INTRAVENOUS at 21:57

## 2020-03-22 RX ADMIN — IPRATROPIUM BROMIDE AND ALBUTEROL SULFATE 1 AMPULE: .5; 3 SOLUTION RESPIRATORY (INHALATION) at 13:56

## 2020-03-22 ASSESSMENT — PULMONARY FUNCTION TESTS
PIF_VALUE: 26
PIF_VALUE: 26
PIF_VALUE: 28
PIF_VALUE: 23
PIF_VALUE: 24
PIF_VALUE: 25
PIF_VALUE: 46
PIF_VALUE: 32
PIF_VALUE: 35
PIF_VALUE: 27
PIF_VALUE: 61
PIF_VALUE: 43
PIF_VALUE: 28
PIF_VALUE: 24
PIF_VALUE: 26
PIF_VALUE: 27
PIF_VALUE: 28
PIF_VALUE: 75
PIF_VALUE: 26
PIF_VALUE: 25
PIF_VALUE: 40
PIF_VALUE: 25
PIF_VALUE: 41
PIF_VALUE: 26
PIF_VALUE: 43
PIF_VALUE: 22
PIF_VALUE: 26
PIF_VALUE: 22
PIF_VALUE: 26
PIF_VALUE: 39
PIF_VALUE: 26
PIF_VALUE: 23
PIF_VALUE: 50

## 2020-03-22 ASSESSMENT — PAIN SCALES - GENERAL
PAINLEVEL_OUTOF10: 0
PAINLEVEL_OUTOF10: 0
PAINLEVEL_OUTOF10: 6
PAINLEVEL_OUTOF10: 4
PAINLEVEL_OUTOF10: 0
PAINLEVEL_OUTOF10: 5
PAINLEVEL_OUTOF10: 2
PAINLEVEL_OUTOF10: 0
PAINLEVEL_OUTOF10: 7
PAINLEVEL_OUTOF10: 0

## 2020-03-22 NOTE — PROGRESS NOTES
Patient noted to have continues labored breathing over Vent with decreased sats. FiO2 increased to maintain O2 sats. Increased urine output noted into ray drainage bag.  Will continue to monitor

## 2020-03-22 NOTE — PROGRESS NOTES
Speak with Dr. Etelvina Gould regarding patient continued distress. Heart rate and resp rate increased. Patient continues to labor over vent. Obtain order for additional lasix.  Will continue to monitor

## 2020-03-22 NOTE — PROGRESS NOTES
Pharmacy Note  Vancomycin Consult    Eric Redman is a 81 y.o. male started on Vancomycin for HCAP; consult received from Dr. Ranjana Venegas to manage therapy. Also receiving the following antibiotics: Merrem    Patient Active Problem List   Diagnosis    CAD (coronary artery disease)    Acute ST elevation myocardial infarction (STEMI) involving left anterior descending (LAD) coronary artery (HCC)    Electronic cigarette use    STEMI (ST elevation myocardial infarction) ([facility] [address]    Chest pain    Syncope and collapse    COPD exacerbation (HCC)    Hypertension    NSTEMI (non-ST elevated myocardial infarction) [facility])    Palliative care patient       Allergies:  Patient has no known allergies. Temp max: 102.7    Recent Labs     03/21/20  0410 03/22/20  0320   BUN 15 18       Recent Labs     03/21/20  0410 03/22/20  0320   CREATININE 0.6 0.7       Recent Labs     03/21/20  0410 03/22/20  0320   WBC 6.6 7.8         Intake/Output Summary (Last 24 hours) at 3/22/2020 1231  Last data filed at 3/22/2020 1000  Gross per 24 hour   Intake 2321.96 ml   Output 2550 ml   Net -228.04 ml       Culture Date Source Results   03/11/20 Blood No growth   03/13/20 Resp Stenotrophomonas maltophilia/heavy normal herbie   03/15/20 Resp Virus PCR Panel RSV detected   03/22/20 Blood Sent   03/22/20 Resp Sent            Ht Readings from Last 1 Encounters:   03/11/20 6' (1.829 m)        Wt Readings from Last 1 Encounters:   03/20/20 220 lb 8 oz (100 kg)         Body mass index is 29.91 kg/m². Estimated Creatinine Clearance: 125 mL/min (based on SCr of 0.7 mg/dL). Assessment/Plan:  Will initiate vancomycin 1750 mg IV every 12 hours. Timing of trough level will be determined based on culture results, renal function, and clinical response. Thank you for the consult. Will continue to follow.     Electronically signed by Lee Sarabia RPh, DAVID, 3/22/2020,12:35 PM

## 2020-03-22 NOTE — PROGRESS NOTES
LOS: 11 days   Patient Care Team:  Ria Taylor MD as PCP - Loni Rock MD as Consulting Physician (Cardiology)    Chief Complaint:  Follow-up respiratory failure, mechanical ventilation, pneumonia, COPD and medical problems listed below    Interval History:       Has mild secretions from the ET tube, slightly yellowish. Had a fever of 101.7 last night. He was diuresed as well and had good urine output but his blood pressure slightly dropped. REVIEW OF SYSTEMS:   Cannot obtain due to mechanical ventilation. Vital Signs   height is 6' (1.829 m) and weight is 220 lb 8 oz (100 kg). His temporal temperature is 98.9 °F (37.2 °C). His blood pressure is 98/59 (abnormal) and his pulse is 87. His respiration is 25 and oxygen saturation is 94%. Intake/Output Summary (Last 24 hours) at 3/22/2020 1417  Last data filed at 3/22/2020 1000  Gross per 24 hour   Intake 2321.96 ml   Output 2025 ml   Net 296.96 ml       Physical Exam:   General Appearance:   Sedated, on mechanical ventilation. HEENT:  ET tube 7.5   Neck:   Trachea midline. No thyromegaly   Lungs:    Diminished breath sounds overall. Mild expiratory wheezing. No crackles. Nonlabored breathing on the ventilator    Heart:    Regular rhythm and normal rate, no murmur   Skin:    No rash but diffuse ecchymosis in arms. Abdomen:     Soft. No tenderness. No dullness. Positive bowel sounds   Neuro/psych:  Sedated. Does not follow commands when off sedation with propofol. Extremities:  Diffuse pitting edema in arms . No clubbing.   Warm extremities and well-perfused          Results Review:    Recent Labs     03/20/20  0310 03/21/20  0410 03/22/20  0320   WBC 6.4 6.6 7.8   RBC 3.38* 3.53* 3.58*   HGB 9.6* 10.1* 10.5*   HCT 30.4* 32.3* 33.0*    185 193   MCV 89.9 91.5 92.2   MCH 28.4 28.6 29.3   MCHC 31.6* 31.3* 31.8*   RDW 17.0* 17.0* 16.5*      Recent Labs     03/20/20  0310 03/21/20  0410 03/21/20  0430 03/22/20  0320 03/22/20  0442     --  138  --  137  --    K 3.5   < > 4.0 4.1 3.6 3.5     --  101  --  98  --    CO2 26  --  27  --  27  --    BUN 21  --  15  --  18  --    CREATININE 0.7  --  0.6  --  0.7  --    CALCIUM 8.1*  --  8.4*  --  8.3*  --    GLUCOSE 102  --  98  --  107  --     < > = values in this interval not displayed. Recent Labs     03/20/20  0432 03/21/20  0430 03/22/20  0442   PHART 7.410 7.410 7.430   RPE6YFF 47.0* 46.0* 45.0   PO2ART 77.0* 70.0* 79.0*   VNC2EVS 29.8* 29.2* 29.9*   N4XJCXNL 94.4 94.0 94.8   BEART 4.5* 4.0* 4.9*     Recent Labs     03/22/20  0320   AST 25   ALT 24   ALKPHOS 69   BILITOT 0.6   MG 1.9   CALCIUM 8.3*   TROPONINI <0.01     Recent Labs     03/22/20  0500   LABGRAM Many Mixed bacterial morphotypes suggestive of  Normal respiratory herbie  Many Epithelial Cells  Many WBC's (Polymorphonuclear)         I reviewed the patient's new clinical results.   I personally viewed and interpreted the patient's CXR        Medication Review:    meropenem  1 g Intravenous Q8H    vancomycin  1,750 mg Intravenous Q12H    vancomycin (VANCOCIN) intermittent dosing (placeholder)   Other RX Placeholder    lidocaine 1 % injection  5 mL Intradermal Once    sodium chloride flush  10 mL Intravenous 2 times per day    metoprolol tartrate  12.5 mg Oral BID    pantoprazole  40 mg Intravenous Daily    insulin lispro  0-6 Units Subcutaneous TID WC    insulin lispro  0-3 Units Subcutaneous Nightly    aspirin  81 mg Oral Daily    atorvastatin  40 mg Oral Nightly    ipratropium-albuterol  1 ampule Inhalation Q4H WA        nitroGLYCERIN Stopped (03/22/20 0602)    dexmedetomidine (PRECEDEX) IV infusion      dextrose      heparin (porcine) 8 Units/kg/hr (03/21/20 1411)    propofol 40 mcg/kg/min (03/22/20 1352)       Diagnostic imaging:  I personally and independently reviewed the following images:  XR 3/21/2020 compared to 3/20/2020:  Upper lobe

## 2020-03-22 NOTE — PROGRESS NOTES
Evening assessment complete and charted at this time. No s/s of distress. Lateral rotation started on bed.  Will continue to monitor

## 2020-03-22 NOTE — PROGRESS NOTES
3/22/2020  4:43 AM - Marielos Ferreira Incoming Lab Results From [facility]     Component Value Ref Range & Units Status Collected Lab   pH, Arterial 7.430  7.350 - 7.450 Final 03/22/2020  4:42 AM [facility]   pCO2, Arterial 45.0  35.0 - 45.0 mmHg Final 03/22/2020  4:42 AM [facility]   pO2, Arterial 79. 0Low   80.0 - 100.0 mmHg Final 03/22/2020  4:42 AM [facility]   HCO3, Arterial 29. 9High   22.0 - 26.0 mmol/L Final 03/22/2020  4:42 AM [facility] Excess, Arterial 4.9High   -2.0 - 2.0 mmol/L Final 03/22/2020  4:42 AM [facility]   Hemoglobin, Art, Extended 11.4Low   14.0 - 18.0 g/dL Final 03/22/2020  4:42 AM [facility]   O2 Sat, Arterial 94.8  >92 % Final 03/22/2020  4:42 AM [facility]   Carboxyhgb, Arterial 2.1  0.0 - 5.0 % Final 03/22/2020  4:42 AM [facility]        0.0-1.5   (Smokers 1.5-5.0)    Methemoglobin, Arterial 1.2  <1.5 % Final 03/22/2020  4:42 AM [facility]   O2 Content, Arterial 15.3  Not Established mL/dL Final 03/22/2020  4:42 AM [facility]   O2 Therapy Unknown   Final 03/22/2020  4:42 AM [facility]   Testing Performed By     Vinayak Marcial Name Director Address Valid Date Range   [facility] - [address] LAB Lillian Rodriguez  [place],[address]  [address] [facility] [address] 08/30/17 0733-Present   Lab and Collection     Blood Gas, Arterial - 3  VC 14, , peep +5, 50%, Lt. Rad.  AT unable

## 2020-03-22 NOTE — PROGRESS NOTES
Hospitalist Progress Note    Patient:  Negro Oliva  YOB: 1950  Date of Service: 3/22/2020  MRN: [id number]   Acct: [de-identified]   Primary Care Physician: Fiath Shell MD  Advance Directive: [facility]  Admit Date: 3/11/2020       Hospital Day: 11  Referring Provider: Kyle Aguirre DO    Patient Seen, Chart, Consults, Notes, Labs, Radiology studies reviewed. Subjective:  Negro Oliva is a 81 y.o. male  whom we are following for hypoxemic respiratory failure, non-ST segment elevation MI, COPD, coronary artery disease. He spiked a fever last night. He was diuresed. He did drop his pressure. Chest x-ray shows a developing infiltrate in the left lower lobe. Allergies:  Patient has no known allergies.     Medicines:  Current Facility-Administered Medications   Medication Dose Route Frequency Provider Last Rate Last Dose    furosemide (LASIX) injection 40 mg  40 mg Intravenous BID Demetrice Mack MD   Stopped at 03/22/20 0902    nitroGLYCERIN 50 mg in dextrose 5% 250 mL infusion  5 mcg/min Intravenous Continuous Marcial Tim MD   Stopped at 03/22/20 0602    acetaminophen (TYLENOL) 160 MG/5ML solution 650 mg  650 mg Oral Q4H PRN Demetrice Mack MD   650 mg at 03/22/20 0504    dexmedetomidine (PRECEDEX) 400 mcg in sodium chloride 0.9 % 100 mL infusion  0.2 mcg/kg/hr Intravenous Continuous Carole Griffin MD        potassium chloride 20 mEq/50 mL IVPB (Central Line)  20 mEq Intravenous PRN Kyle Aguirre DO 50 mL/hr at 03/18/20 1058 20 mEq at 03/18/20 1058    lidocaine PF 1 % injection 5 mL  5 mL Intradermal Once Mario Melendez MD        sodium chloride flush 0.9 % injection 10 mL  10 mL Intravenous 2 times per day Mario Melendez MD   10 mL at 03/22/20 0941    sodium chloride flush 0.9 % injection 10 mL  10 mL Intravenous PRN Mario Melendez MD        metoprolol tartrate (LOPRESSOR) tablet 12.5 mg  12.5 mg Oral BID Marcial Tim MD   Stopped at 03/22/20 1765    morphine injection 2 mg  2 mg Intravenous Q1H PRN Titi Gordon MD   2 mg at 03/22/20 0234    pantoprazole (PROTONIX) injection 40 mg  40 mg Intravenous Daily Alexandria Nova MD   40 mg at 03/22/20 0941    insulin lispro (HUMALOG) injection vial 0-6 Units  0-6 Units Subcutaneous TID WC Alexandria Nova MD        insulin lispro (HUMALOG) injection vial 0-3 Units  0-3 Units Subcutaneous Nightly Alexandria Nova MD   1 Units at 03/11/20 2211    aspirin chewable tablet 81 mg  81 mg Oral Daily Alexandria Nova MD   81 mg at 03/22/20 0941    atorvastatin (LIPITOR) tablet 40 mg  40 mg Oral Nightly Alexandria Nova MD   40 mg at 03/21/20 2019    glucose (GLUTOSE) 40 % oral gel 15 g  15 g Oral PRN Alexandria Nova MD        dextrose 50 % IV solution  12.5 g Intravenous PRN Alexandria Nova MD        glucagon (rDNA) injection 1 mg  1 mg Intramuscular PRN Alexandria Nova MD        dextrose 5 % solution  100 mL/hr Intravenous PRN Alexandria Nova MD        ipratropium-albuterol (DUONEB) nebulizer solution 1 ampule  1 ampule Inhalation Q4H Deisy Zimmerman MD   1 ampule at 03/22/20 1015    heparin (porcine) injection 4,000 Units  4,000 Units Intravenous PRN Alexandria Nova MD   4,000 Units at 03/15/20 0618    heparin (porcine) injection 2,000 Units  2,000 Units Intravenous PRN Alexandria Nova MD        heparin 25,000 units in dextrose 5% 250 mL infusion  10 Units/kg/hr Intravenous Continuous Alexandria Nova MD 8.6 mL/hr at 03/21/20 1411 8 Units/kg/hr at 03/21/20 1411    propofol injection  10 mcg/kg/min Intravenous Titrated Miguel Angel Prescott MD 25.7 mL/hr at 03/22/20 1110 40 mcg/kg/min at 03/22/20 1110       Past Medical History:  Past Medical History:   Diagnosis Date    CAD (coronary artery disease)     Chest pain 11/14/2018    [facility]F [facility]congestive heart failure) [facility] [address]     COPD (chronic obstructive pulmonary disease) ([facility] [address]     Hyperlipidemia     Hypertension     Palliative care patient 03/12/2020   Roc Rangel results for input(s): PROTIME, INR in the last 72 hours. APTT:   Recent Labs     03/21/20  1550 03/21/20  2140 03/22/20  0320   APTT 63.1* 61.2* 52.0*     BNP:  No results for input(s): BNP in the last 72 hours. Ionized Calcium:No results for input(s): IONCA in the last 72 hours. Magnesium:  Recent Labs     03/20/20  0310 03/21/20  0410 03/22/20  0320   MG 2.1 2.1 1.9     Phosphorus:No results for input(s): PHOS in the last 72 hours. HgbA1C: No results for input(s): LABA1C in the last 72 hours. Hepatic:   Recent Labs     03/20/20  0310 03/21/20  0410 03/22/20  0320   ALKPHOS 49 58 69   ALT 21 21 24   AST 17 21 25   PROT 5.7* 6.2* 6.6   BILITOT 0.5 0.5 0.6   LABALBU 2.7* 2.8* 2.9*     Lactic Acid: No results for input(s): LACTA in the last 72 hours. Troponin: No results for input(s): CKTOTAL, CKMB, TROPONINT in the last 72 hours. ABGs: No results for input(s): PH, PCO2, PO2, HCO3, O2SAT in the last 72 hours. CRP:  No results for input(s): CRP in the last 72 hours. Sed Rate:  No results for input(s): SEDRATE in the last 72 hours. Cultures:   No results for input(s): CULTURE in the last 72 hours. No results for input(s): BC, Sallye Callow in the last 72 hours. No results for input(s): CXSURG in the last 72 hours. Radiology reports as per the Radiologist  Radiology: Xr Chest Portable    Result Date: 3/12/2020  EXAMINATION: XR CHEST PORTABLE 3/12/2020 7:19 AM HISTORY: XR CHEST PORTABLE 3/12/2020 2:00 AM HISTORY: Intubated COMPARISON: March 11, 2020. FINDINGS: Hyperinflation is present. No infiltrates or consolidation is identified. Cardiac silhouettes normal. Endotracheal tube is present. . The osseous structures and surrounding soft tissues demonstrate no acute abnormality. 1. COPD no acute cardiopulmonary process. Signed by Dr Karina Fleming on 3/12/2020 7:20 AM    Xr Chest Portable    Result Date: 3/11/2020  XR CHEST PORTABLE 3/11/2020 4:45 PM HISTORY:   Respiratory distress  Single view.  COMPARISONS: 4/17/2019 chest radiography FINDINGS: Endotracheal tube position above the marcella. NG tube looped in the proximal stomach. The lungs are clear without parenchymal infiltrates. The heart is normal in size, pulmonary circulation appropriate, without heart failure. The bony structures are intact. 1. Line and support tubes well-positioned. 2. No acute cardiopulmonary process. Signed by Dr Pearce [facility] on 3/11/2020 7:07 PM       Assessment     Non-ST segment elevation MI. Eventual cardiac cath. Continue medical management.     Exacerbation of COPD. Medical management.     Hypoxemic respiratory failure. Continue vent support. Weaning as tolerated.     RSV pneumonia. Supportive care. New onset fever-probable hospital-acquired pneumonia. Vancomycin. Meropenem. Monitor.       Ashtyn Land DO

## 2020-03-23 ENCOUNTER — APPOINTMENT (OUTPATIENT)
Dept: GENERAL RADIOLOGY | Age: 70
DRG: 207 | End: 2020-03-23
Attending: INTERNAL MEDICINE
Payer: MEDICARE

## 2020-03-23 ENCOUNTER — APPOINTMENT (OUTPATIENT)
Dept: ULTRASOUND IMAGING | Age: 70
DRG: 207 | End: 2020-03-23
Attending: INTERNAL MEDICINE
Payer: MEDICARE

## 2020-03-23 ENCOUNTER — OUTSIDE FACILITY SERVICE (OUTPATIENT)
Dept: PULMONOLOGY | Facility: CLINIC | Age: 70
End: 2020-03-23

## 2020-03-23 LAB
ALBUMIN SERPL-MCNC: 2.3 G/DL (ref 3.5–5.2)
ALP BLD-CCNC: 61 U/L (ref 40–130)
ALT SERPL-CCNC: 17 U/L (ref 5–41)
ANION GAP SERPL CALCULATED.3IONS-SCNC: 10 MMOL/L (ref 7–19)
APTT: 58.9 SEC (ref 26–36.2)
APTT: 62.8 SEC (ref 26–36.2)
APTT: 65.2 SEC (ref 26–36.2)
AST SERPL-CCNC: 17 U/L (ref 5–40)
BASE EXCESS ARTERIAL: 6 MMOL/L (ref -2–2)
BASE EXCESS ARTERIAL: 7.1 MMOL/L (ref -2–2)
BASE EXCESS ARTERIAL: 7.6 MMOL/L (ref -2–2)
BASOPHILS ABSOLUTE: 0 K/UL (ref 0–0.2)
BASOPHILS RELATIVE PERCENT: 0.3 % (ref 0–1)
BILIRUB SERPL-MCNC: 0.4 MG/DL (ref 0.2–1.2)
BUN BLDV-MCNC: 25 MG/DL (ref 8–23)
CALCIUM SERPL-MCNC: 8.2 MG/DL (ref 8.8–10.2)
CARBOXYHEMOGLOBIN ARTERIAL: 2.3 % (ref 0–5)
CARBOXYHEMOGLOBIN ARTERIAL: 2.4 % (ref 0–5)
CARBOXYHEMOGLOBIN ARTERIAL: 4.1 % (ref 0–5)
CHLORIDE BLD-SCNC: 97 MMOL/L (ref 98–111)
CO2: 27 MMOL/L (ref 22–29)
CREAT SERPL-MCNC: 0.8 MG/DL (ref 0.5–1.2)
EKG P AXIS: 79 DEGREES
EKG P-R INTERVAL: 142 MS
EKG Q-T INTERVAL: 316 MS
EKG QRS DURATION: 126 MS
EKG QTC CALCULATION (BAZETT): 426 MS
EKG T AXIS: 43 DEGREES
EOSINOPHILS ABSOLUTE: 0.4 K/UL (ref 0–0.6)
EOSINOPHILS RELATIVE PERCENT: 4.6 % (ref 0–5)
GFR NON-AFRICAN AMERICAN: >60
GLUCOSE BLD-MCNC: 116 MG/DL (ref 70–99)
GLUCOSE BLD-MCNC: 139 MG/DL (ref 70–99)
GLUCOSE BLD-MCNC: 166 MG/DL (ref 74–109)
GLUCOSE BLD-MCNC: 77 MG/DL (ref 70–99)
GLUCOSE BLD-MCNC: 89 MG/DL (ref 70–99)
HCO3 ARTERIAL: 30.6 MMOL/L (ref 22–26)
HCO3 ARTERIAL: 31.3 MMOL/L (ref 22–26)
HCO3 ARTERIAL: 32.6 MMOL/L (ref 22–26)
HCT VFR BLD CALC: 26.9 % (ref 42–52)
HEMOGLOBIN, ART, EXTENDED: 8.7 G/DL (ref 14–18)
HEMOGLOBIN, ART, EXTENDED: 8.8 G/DL (ref 14–18)
HEMOGLOBIN, ART, EXTENDED: 9.3 G/DL (ref 14–18)
HEMOGLOBIN: 8.4 G/DL (ref 14–18)
IMMATURE GRANULOCYTES #: 0.1 K/UL
LYMPHOCYTES ABSOLUTE: 0.7 K/UL (ref 1.1–4.5)
LYMPHOCYTES RELATIVE PERCENT: 8.4 % (ref 20–40)
MAGNESIUM: 2.1 MG/DL (ref 1.6–2.4)
MCH RBC QN AUTO: 28.4 PG (ref 27–31)
MCHC RBC AUTO-ENTMCNC: 31.2 G/DL (ref 33–37)
MCV RBC AUTO: 90.9 FL (ref 80–94)
METHEMOGLOBIN ARTERIAL: 0.9 %
METHEMOGLOBIN ARTERIAL: 1.1 %
METHEMOGLOBIN ARTERIAL: 1.2 %
MONOCYTES ABSOLUTE: 0.5 K/UL (ref 0–0.9)
MONOCYTES RELATIVE PERCENT: 5.5 % (ref 0–10)
NEUTROPHILS ABSOLUTE: 7 K/UL (ref 1.5–7.5)
NEUTROPHILS RELATIVE PERCENT: 80.5 % (ref 50–65)
O2 CONTENT ARTERIAL: 11.6 ML/DL
O2 CONTENT ARTERIAL: 11.7 ML/DL
O2 CONTENT ARTERIAL: 12.2 ML/DL
O2 SAT, ARTERIAL: 92.8 %
O2 SAT, ARTERIAL: 93.6 %
O2 SAT, ARTERIAL: 93.9 %
O2 THERAPY: ABNORMAL
PCO2 ARTERIAL: 42 MMHG (ref 35–45)
PCO2 ARTERIAL: 44 MMHG (ref 35–45)
PCO2 ARTERIAL: 48 MMHG (ref 35–45)
PDW BLD-RTO: 16.6 % (ref 11.5–14.5)
PERFORMED ON: ABNORMAL
PERFORMED ON: ABNORMAL
PERFORMED ON: NORMAL
PERFORMED ON: NORMAL
PH ARTERIAL: 7.44 (ref 7.35–7.45)
PH ARTERIAL: 7.45 (ref 7.35–7.45)
PH ARTERIAL: 7.48 (ref 7.35–7.45)
PLATELET # BLD: 209 K/UL (ref 130–400)
PMV BLD AUTO: 10.1 FL (ref 9.4–12.4)
PO2 ARTERIAL: 60 MMHG (ref 80–100)
PO2 ARTERIAL: 67 MMHG (ref 80–100)
PO2 ARTERIAL: 70 MMHG (ref 80–100)
POTASSIUM SERPL-SCNC: 3.4 MMOL/L (ref 3.5–5)
POTASSIUM, WHOLE BLOOD: 3.1
POTASSIUM, WHOLE BLOOD: 3.1
POTASSIUM, WHOLE BLOOD: 3.4
RBC # BLD: 2.96 M/UL (ref 4.7–6.1)
SODIUM BLD-SCNC: 134 MMOL/L (ref 136–145)
TOTAL PROTEIN: 5.8 G/DL (ref 6.6–8.7)
WBC # BLD: 8.7 K/UL (ref 4.8–10.8)

## 2020-03-23 PROCEDURE — 2580000003 HC RX 258: Performed by: INTERNAL MEDICINE

## 2020-03-23 PROCEDURE — 87081 CULTURE SCREEN ONLY: CPT

## 2020-03-23 PROCEDURE — 2700000000 HC OXYGEN THERAPY PER DAY

## 2020-03-23 PROCEDURE — 6360000002 HC RX W HCPCS: Performed by: INTERNAL MEDICINE

## 2020-03-23 PROCEDURE — 82803 BLOOD GASES ANY COMBINATION: CPT

## 2020-03-23 PROCEDURE — 85025 COMPLETE CBC W/AUTO DIFF WBC: CPT

## 2020-03-23 PROCEDURE — 82947 ASSAY GLUCOSE BLOOD QUANT: CPT

## 2020-03-23 PROCEDURE — 2100000000 HC CCU R&B

## 2020-03-23 PROCEDURE — 36600 WITHDRAWAL OF ARTERIAL BLOOD: CPT

## 2020-03-23 PROCEDURE — 6370000000 HC RX 637 (ALT 250 FOR IP): Performed by: INTERNAL MEDICINE

## 2020-03-23 PROCEDURE — 84132 ASSAY OF SERUM POTASSIUM: CPT

## 2020-03-23 PROCEDURE — 6360000002 HC RX W HCPCS: Performed by: HOSPITALIST

## 2020-03-23 PROCEDURE — 93010 ELECTROCARDIOGRAM REPORT: CPT | Performed by: INTERNAL MEDICINE

## 2020-03-23 PROCEDURE — 76882 US LMTD JT/FCL EVL NVASC XTR: CPT

## 2020-03-23 PROCEDURE — 94640 AIRWAY INHALATION TREATMENT: CPT

## 2020-03-23 PROCEDURE — 99233 SBSQ HOSP IP/OBS HIGH 50: CPT | Performed by: INTERNAL MEDICINE

## 2020-03-23 PROCEDURE — 94003 VENT MGMT INPAT SUBQ DAY: CPT

## 2020-03-23 PROCEDURE — 83735 ASSAY OF MAGNESIUM: CPT

## 2020-03-23 PROCEDURE — 2500000003 HC RX 250 WO HCPCS: Performed by: INTERNAL MEDICINE

## 2020-03-23 PROCEDURE — 85730 THROMBOPLASTIN TIME PARTIAL: CPT

## 2020-03-23 PROCEDURE — 6370000000 HC RX 637 (ALT 250 FOR IP): Performed by: HOSPITALIST

## 2020-03-23 PROCEDURE — 36592 COLLECT BLOOD FROM PICC: CPT

## 2020-03-23 PROCEDURE — 80053 COMPREHEN METABOLIC PANEL: CPT

## 2020-03-23 PROCEDURE — 71045 X-RAY EXAM CHEST 1 VIEW: CPT

## 2020-03-23 PROCEDURE — C9113 INJ PANTOPRAZOLE SODIUM, VIA: HCPCS | Performed by: INTERNAL MEDICINE

## 2020-03-23 PROCEDURE — 93005 ELECTROCARDIOGRAM TRACING: CPT | Performed by: INTERNAL MEDICINE

## 2020-03-23 RX ORDER — METHYLPREDNISOLONE SODIUM SUCCINATE 125 MG/2ML
125 INJECTION, POWDER, LYOPHILIZED, FOR SOLUTION INTRAMUSCULAR; INTRAVENOUS EVERY 8 HOURS
Status: DISCONTINUED | OUTPATIENT
Start: 2020-03-23 | End: 2020-03-24

## 2020-03-23 RX ORDER — IPRATROPIUM BROMIDE AND ALBUTEROL SULFATE 2.5; .5 MG/3ML; MG/3ML
1 SOLUTION RESPIRATORY (INHALATION) EVERY 4 HOURS
Status: DISCONTINUED | OUTPATIENT
Start: 2020-03-23 | End: 2020-03-24

## 2020-03-23 RX ORDER — NITROGLYCERIN 0.4 MG/1
0.4 TABLET SUBLINGUAL EVERY 5 MIN PRN
Status: DISCONTINUED | OUTPATIENT
Start: 2020-03-23 | End: 2020-03-27 | Stop reason: HOSPADM

## 2020-03-23 RX ORDER — HEPARIN SODIUM 1000 [USP'U]/ML
2000 INJECTION, SOLUTION INTRAVENOUS; SUBCUTANEOUS PRN
Status: DISCONTINUED | OUTPATIENT
Start: 2020-03-23 | End: 2020-03-26 | Stop reason: ALTCHOICE

## 2020-03-23 RX ORDER — HEPARIN SODIUM 1000 [USP'U]/ML
4000 INJECTION, SOLUTION INTRAVENOUS; SUBCUTANEOUS ONCE
Status: COMPLETED | OUTPATIENT
Start: 2020-03-24 | End: 2020-03-24

## 2020-03-23 RX ORDER — HEPARIN SODIUM 1000 [USP'U]/ML
4000 INJECTION, SOLUTION INTRAVENOUS; SUBCUTANEOUS PRN
Status: DISCONTINUED | OUTPATIENT
Start: 2020-03-23 | End: 2020-03-26 | Stop reason: ALTCHOICE

## 2020-03-23 RX ORDER — HEPARIN SODIUM 10000 [USP'U]/100ML
12 INJECTION, SOLUTION INTRAVENOUS CONTINUOUS
Status: DISCONTINUED | OUTPATIENT
Start: 2020-03-24 | End: 2020-03-24

## 2020-03-23 RX ORDER — NITROGLYCERIN 20 MG/100ML
5 INJECTION INTRAVENOUS CONTINUOUS
Status: DISCONTINUED | OUTPATIENT
Start: 2020-03-24 | End: 2020-03-24

## 2020-03-23 RX ADMIN — DEXMEDETOMIDINE HYDROCHLORIDE 0.2 MCG/KG/HR: 100 INJECTION, SOLUTION INTRAVENOUS at 05:25

## 2020-03-23 RX ADMIN — IPRATROPIUM BROMIDE AND ALBUTEROL SULFATE 1 AMPULE: .5; 3 SOLUTION RESPIRATORY (INHALATION) at 14:41

## 2020-03-23 RX ADMIN — HEPARIN SODIUM 7 UNITS/KG/HR: 10000 INJECTION, SOLUTION INTRAVENOUS at 02:19

## 2020-03-23 RX ADMIN — PROPOFOL 45 MCG/KG/MIN: 10 INJECTION, EMULSION INTRAVENOUS at 03:55

## 2020-03-23 RX ADMIN — METHYLPREDNISOLONE SODIUM SUCCINATE 125 MG: 125 INJECTION, POWDER, FOR SOLUTION INTRAMUSCULAR; INTRAVENOUS at 21:39

## 2020-03-23 RX ADMIN — HEPARIN SODIUM 12 UNITS/KG/HR: 10000 INJECTION, SOLUTION INTRAVENOUS at 23:53

## 2020-03-23 RX ADMIN — METOPROLOL TARTRATE 12.5 MG: 25 TABLET, FILM COATED ORAL at 23:12

## 2020-03-23 RX ADMIN — MEROPENEM 1 G: 1 INJECTION, POWDER, FOR SOLUTION INTRAVENOUS at 04:59

## 2020-03-23 RX ADMIN — VANCOMYCIN HYDROCHLORIDE 1750 MG: 10 INJECTION, POWDER, LYOPHILIZED, FOR SOLUTION INTRAVENOUS at 14:49

## 2020-03-23 RX ADMIN — ASPIRIN 81 MG 81 MG: 81 TABLET ORAL at 08:44

## 2020-03-23 RX ADMIN — ATORVASTATIN CALCIUM 40 MG: 40 TABLET, FILM COATED ORAL at 20:23

## 2020-03-23 RX ADMIN — MEROPENEM 1 G: 1 INJECTION, POWDER, FOR SOLUTION INTRAVENOUS at 13:22

## 2020-03-23 RX ADMIN — MEROPENEM 1 G: 1 INJECTION, POWDER, FOR SOLUTION INTRAVENOUS at 21:00

## 2020-03-23 RX ADMIN — IPRATROPIUM BROMIDE AND ALBUTEROL SULFATE 1 AMPULE: 2.5; .5 SOLUTION RESPIRATORY (INHALATION) at 22:15

## 2020-03-23 RX ADMIN — IPRATROPIUM BROMIDE AND ALBUTEROL SULFATE 1 AMPULE: .5; 3 SOLUTION RESPIRATORY (INHALATION) at 06:25

## 2020-03-23 RX ADMIN — POTASSIUM CHLORIDE 20 MEQ: 29.8 INJECTION, SOLUTION INTRAVENOUS at 06:33

## 2020-03-23 RX ADMIN — PANTOPRAZOLE SODIUM 40 MG: 40 INJECTION, POWDER, FOR SOLUTION INTRAVENOUS at 08:44

## 2020-03-23 RX ADMIN — POTASSIUM CHLORIDE 20 MEQ: 29.8 INJECTION, SOLUTION INTRAVENOUS at 05:21

## 2020-03-23 RX ADMIN — SODIUM CHLORIDE, PRESERVATIVE FREE 10 ML: 5 INJECTION INTRAVENOUS at 08:47

## 2020-03-23 RX ADMIN — PROPOFOL 40 MCG/KG/MIN: 10 INJECTION, EMULSION INTRAVENOUS at 00:31

## 2020-03-23 RX ADMIN — SODIUM CHLORIDE, PRESERVATIVE FREE 10 ML: 5 INJECTION INTRAVENOUS at 20:20

## 2020-03-23 RX ADMIN — IPRATROPIUM BROMIDE AND ALBUTEROL SULFATE 1 AMPULE: .5; 3 SOLUTION RESPIRATORY (INHALATION) at 10:57

## 2020-03-23 RX ADMIN — VANCOMYCIN HYDROCHLORIDE 1750 MG: 10 INJECTION, POWDER, LYOPHILIZED, FOR SOLUTION INTRAVENOUS at 02:14

## 2020-03-23 RX ADMIN — METOPROLOL TARTRATE 12.5 MG: 25 TABLET, FILM COATED ORAL at 20:23

## 2020-03-23 RX ADMIN — MORPHINE SULFATE 2 MG: 4 INJECTION INTRAVENOUS at 13:20

## 2020-03-23 RX ADMIN — IPRATROPIUM BROMIDE AND ALBUTEROL SULFATE 1 AMPULE: .5; 3 SOLUTION RESPIRATORY (INHALATION) at 19:21

## 2020-03-23 ASSESSMENT — PULMONARY FUNCTION TESTS
PIF_VALUE: 31
PIF_VALUE: 44
PIF_VALUE: 32
PIF_VALUE: 34
PIF_VALUE: 34
PIF_VALUE: 33
PIF_VALUE: 35
PIF_VALUE: 13
PIF_VALUE: 12
PIF_VALUE: 32
PIF_VALUE: 33
PIF_VALUE: 28
PIF_VALUE: 33
PIF_VALUE: 40
PIF_VALUE: 13
PIF_VALUE: 33
PIF_VALUE: 13
PIF_VALUE: 31
PIF_VALUE: 75
PIF_VALUE: 34
PIF_VALUE: 36
PIF_VALUE: 35
PIF_VALUE: 13
PIF_VALUE: 76

## 2020-03-23 ASSESSMENT — PAIN DESCRIPTION - PAIN TYPE: TYPE: ACUTE PAIN

## 2020-03-23 ASSESSMENT — PAIN DESCRIPTION - LOCATION: LOCATION: CHEST

## 2020-03-23 ASSESSMENT — PAIN SCALES - GENERAL
PAINLEVEL_OUTOF10: 9
PAINLEVEL_OUTOF10: 0
PAINLEVEL_OUTOF10: 0
PAINLEVEL_OUTOF10: 5
PAINLEVEL_OUTOF10: 0
PAINLEVEL_OUTOF10: 0

## 2020-03-23 ASSESSMENT — PAIN DESCRIPTION - DESCRIPTORS: DESCRIPTORS: PRESSURE

## 2020-03-23 ASSESSMENT — PAIN DESCRIPTION - ORIENTATION: ORIENTATION: MID

## 2020-03-23 NOTE — PROGRESS NOTES
Continuous nEa Sidhu MD 7.5 mL/hr at 20 0219 7 Units/kg/hr at 20 0149    propofol injection  10 mcg/kg/min Intravenous Titrated Link Lizarraga MD   Stopped at 20 2988       Past Medical History:  Past Medical History:   Diagnosis Date    CAD (coronary artery disease)     Chest pain 2018    CHF (congestive heart failure) (HCC)     COPD (chronic obstructive pulmonary disease) ([facility] [address]     Hyperlipidemia     Hypertension     Palliative care patient 2020    Pneumonia        Past Surgical History:  Past Surgical History:   Procedure Laterality Date    APPENDECTOMY      BACK SURGERY      COLONOSCOPY      ENDOSCOPY, COLON, DIAGNOSTIC         Family History  Family History   Problem Relation Age of Onset    Cancer Sister     Cancer Brother        Social History  Social History     Socioeconomic History    Marital status:      Spouse name: Abelardo Rodas    Number of children: 3    Years of education: 6    Highest education level: Not on file   Occupational History    Not on file   Social Needs    Financial resource strain: Not on file    Food insecurity     Worry: Not on file     Inability: Not on file   Get[employer] needs     Medical: Not on file     Non-medical: Not on file   Tobacco Use    Smoking status: Former Smoker     Packs/day: 1.00     Years: 50.00     Pack years: 50.00     Types: Cigarettes     Last attempt to quit: 2018     Years since quittin.8    Smokeless tobacco: Current User     Types: Chew   Substance and Sexual Activity    Alcohol use: Not Currently     Frequency: Never     Comment: occassional    Drug use: No    Sexual activity: Not on file   Lifestyle    Physical activity     Days per week: Not on file     Minutes per session: Not on file    Stress: Not on file   Relationships    Social connections     Talks on phone: Not on file     Gets together: Not on file     Attends Mu-ism service: Not on file     Active member of club or organization: Not on file     Attends meetings of clubs or organizations: Not on file     Relationship status: Not on file    Intimate partner violence     Fear of current or ex partner: Not on file     Emotionally abused: Not on file     Physically abused: Not on file     Forced sexual activity: Not on file   Other Topics Concern    Not on file   Social History Narrative     50 years only marriage    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 rate 24, height 6' (1.829 m), weight 220 lb 8 oz (100 kg), SpO2 (!) 85 %. Intake/Output Summary (Last 24 hours) at 3/23/2020 1214  Last data filed at 3/23/2020 0767  Gross per 24 hour   Intake 3521.77 ml   Output 2375 ml   Net 1146.77 ml       Physical Exam  Vitals signs reviewed. Constitutional:       Appearance: He is well-developed. He is ill-appearing. HENT:      Head: Normocephalic and atraumatic. Eyes:      Conjunctiva/sclera: Conjunctivae normal.      Pupils: Pupils are equal, round, and reactive to light. Cardiovascular:      Rate and Rhythm: Normal rate and regular rhythm. Heart sounds: Normal heart sounds. Pulmonary:      Breath sounds: Rhonchi present. Abdominal:      General: Abdomen is flat. Palpations: Abdomen is soft. Skin:     General: Skin is warm and dry.          Labs:  BMP:   Recent Labs     03/21/20  0410  03/22/20  0320 03/22/20  0442 03/23/20  0330 03/23/20  0420     --  137  --  134*  --    K 4.0   < > 3.6 3.5 3.4* 3.1     --  98  --  97*  --    CO2 27  -- 27  --  27  --    BUN 15  --  18  --  25*  --    CREATININE 0.6  --  0.7  --  0.8  --    CALCIUM 8.4*  --  8.3*  --  8.2*  --     < > = values in this interval not displayed. CBC:   Recent Labs     03/21/20 0410 03/22/20 0320 03/23/20  0330   WBC 6.6 7.8 8.7   HGB 10.1* 10.5* 8.4*   HCT 32.3* 33.0* 26.9*   MCV 91.5 92.2 90.9    193 209     LIVER PROFILE:   Recent Labs     03/21/20 0410 03/22/20 0320 03/23/20  0330   AST 21 25 17   ALT 21 24 17   BILITOT 0.5 0.6 0.4   ALKPHOS 58 69 61     PT/INR: No results for input(s): PROTIME, INR in the last 72 hours. APTT:   Recent Labs     03/22/20 2150 03/23/20 0330 03/23/20  1000   APTT 67.9* 62.8* 65.2*     BNP:  No results for input(s): BNP in the last 72 hours. Ionized Calcium:No results for input(s): IONCA in the last 72 hours. Magnesium:  Recent Labs     03/21/20 0410 03/22/20 0320 03/23/20  0330   MG 2.1 1.9 2.1     Phosphorus:No results for input(s): PHOS in the last 72 hours. HgbA1C: No results for input(s): LABA1C in the last 72 hours. Hepatic:   Recent Labs     03/21/20 0410 03/22/20 0320 03/23/20  0330   ALKPHOS 58 69 61   ALT 21 24 17   AST 21 25 17   PROT 6.2* 6.6 5.8*   BILITOT 0.5 0.6 0.4   LABALBU 2.8* 2.9* 2.3*     Lactic Acid: No results for input(s): LACTA in the last 72 hours. Troponin: No results for input(s): CKTOTAL, CKMB, TROPONINT in the last 72 hours. ABGs: No results for input(s): PH, PCO2, PO2, HCO3, O2SAT in the last 72 hours. CRP:  No results for input(s): CRP in the last 72 hours. Sed Rate:  No results for input(s): SEDRATE in the last 72 hours. Cultures:   No results for input(s): CULTURE in the last 72 hours. Recent Labs     03/22/20  0455 03/22/20  0510   BC No Growth to date. Any change in status will be called. --    BLOODCULT2  --  No Growth to date. Any change in status will be called. No results for input(s): CXSURG in the last 72 hours.     Radiology reports as per the Radiologist  Radiology: [facility] Chest Portable    Result Date: 3/12/2020  EXAMINATION: XR CHEST PORTABLE 3/12/2020 7:19 AM HISTORY: XR CHEST PORTABLE 3/12/2020 2:00 AM HISTORY: Intubated COMPARISON: March 11, 2020. FINDINGS: Hyperinflation is present. No infiltrates or consolidation is identified. Cardiac silhouettes normal. Endotracheal tube is present. . The osseous structures and surrounding soft tissues demonstrate no acute abnormality. 1. COPD no acute cardiopulmonary process. Signed by Dr Hao Orozco on 3/12/2020 7:20 AM    Xr Chest Portable    Result Date: 3/11/2020  XR CHEST PORTABLE 3/11/2020 4:45 PM HISTORY:   Respiratory distress  Single view. COMPARISONS:  4/17/2019 chest radiography FINDINGS: Endotracheal tube position above the marcella. NG tube looped in the proximal stomach. The lungs are clear without parenchymal infiltrates. The heart is normal in size, pulmonary circulation appropriate, without heart failure. The bony structures are intact. 1. Line and support tubes well-positioned. 2. No acute cardiopulmonary process. Signed by Dr Kaykay Gay on 3/11/2020 7:07 PM       Assessment     Non-ST segment elevation MI. Continue medical management.     Exacerbation of COPD. Medical management.     Hypoxemic respiratory failure. Continue vent support. Unable to tolerate weaning.     RSV pneumonia. Supportive care.     New onset fever-probable hospital-acquired pneumonia. Vancomycin. Meropenem. Monitor.       Laney Bronson DO

## 2020-03-23 NOTE — PROGRESS NOTES
Subjective :      Elena Haas is a 81 y.o. male referral for eval RUE swelling, hardness, discoloration. Patient has a Other: right arm edematous, purple discoloration which is located on the Left arm. Objective:        Wound:  Left arm edematous, purple discoloration the entire arm. Ntoed at lateral elbow area and nodule area 1cm x 4cm x 0cm, that is soft to palpate, slight firm noted in the center, patient shook his head yes,  that is is sore, area measures 1cm x 4cm x 0cm. Radial pulse is palpable, nails right hand jonathan < 3 secs. Scattered whitish areas to left lower arm. Left arm is elevated with pilloe. Wound Description: see above  This was originally measured on 3/23/20  Measurements shown are from today's visit:3/23/20      Assessment :      Other: noted from chart and staff that IV infiltrated about a week ago. PICC line was placed on 3/17/20    3/23/20 US Extremity right non vasc limited   likelihood of abscess may not be ruled out, please see complete report. Plan :      Plan for wound: Recommend to keep arm clean and dry, elevated. Dress per physician order:    Pressure Ulcer Prevention Protocol and wound care order sets reviewed and updated. Discussed with patient nurse Vance Edwadrs. Noted Palliative care has been consulted to meet with patient and family. Noted ultrasound report 3/23/20 re: Sony Kelly is less likelihood of an abscess which may not be completely ruled out. \"     May need Gen Surgery eval for additional recommendations.       Олег Hubbard RN 3/23/2020

## 2020-03-23 NOTE — PROGRESS NOTES
MCHC 31.3* 31.8* 31.2*   RDW 17.0* 16.5* 16.6*      Recent Labs     03/21/20 0410 03/22/20 0320 03/22/20 0442 03/23/20  0330 03/23/20  0420     --  137  --  134*  --    K 4.0   < > 3.6 3.5 3.4* 3.1     --  98  --  97*  --    CO2 27  --  27  --  27  --    BUN 15  --  18  --  25*  --    CREATININE 0.6  --  0.7  --  0.8  --    CALCIUM 8.4*  --  8.3*  --  8.2*  --    GLUCOSE 98  --  107  --  166*  --     < > = values in this interval not displayed. Recent Labs     03/21/20 0430 03/22/20 0442 03/23/20  0420   PHART 7.410 7.430 7.440   MKM5LGB 46.0* 45.0 48.0*   PO2ART 70.0* 79.0* 70.0*   BCS6DFO 29.2* 29.9* 32.6*   T1IWKBBZ 94.0 94.8 93.9   BEART 4.0* 4.9* 7.6*     Recent Labs     03/22/20 0320 03/23/20  0330   AST 25 17   ALT 24 17   ALKPHOS 69 61   BILITOT 0.6 0.4   MG 1.9 2.1   CALCIUM 8.3* 8.2*   TROPONINI <0.01  --      Recent Labs     03/22/20 0455 03/22/20  0500   BC No Growth to date. Any change in status will be called. --    LABGRAM  --  Many Mixed bacterial morphotypes suggestive of  Normal respiratory herbie  Many Epithelial Cells  Many WBC's (Polymorphonuclear)     CULTRESP  --  Heavy growth normal respiratory herbie with*  Moderate growth  ID and sensitivity to follow         I reviewed the patient's new clinical results.   I personally viewed and interpreted the patient's CXR        Medication Review:    meropenem  1 g Intravenous Q8H    vancomycin  1,750 mg Intravenous Q12H    vancomycin (VANCOCIN) intermittent dosing (placeholder)   Other RX Placeholder    lidocaine 1 % injection  5 mL Intradermal Once    sodium chloride flush  10 mL Intravenous 2 times per day    metoprolol tartrate  12.5 mg Oral BID    pantoprazole  40 mg Intravenous Daily    insulin lispro  0-6 Units Subcutaneous TID WC    insulin lispro  0-3 Units Subcutaneous Nightly    aspirin  81 mg Oral Daily    atorvastatin  40 mg Oral Nightly    ipratropium-albuterol  1 ampule Inhalation Q4H WA        dexmedetomidine (PRECEDEX) IV infusion 0.3 mcg/kg/hr (03/23/20 1306)    dextrose      heparin (porcine) 7 Units/kg/hr (03/23/20 0219)    propofol Stopped (03/23/20 1908)       Diagnostic imaging:  I personally and independently reviewed the following images:  XR 3/21/2020 compared to 3/20/2020:  Upper lobe emphysema. Right hilar/lower lobe infiltrates. CT chest 3/13/2020:  Upper lobe predominant emphysema. Bilateral lower lobe nodular infiltrates. CXR 3/23/2020 compared to 3/22/2020:  Stable bibasilar infiltrates more prominent on the left. Assessment:  1. Acute on chronic hypoxic respiratory failure  2. RSV pneumonia  3. New left lower lobe consolidation  4. Fever 3/22/2020  5. Stenotrophomonas pneumonia/colonization  6. NSTEMI  7. COPD exacerbation  8. Protein calorie malnutrition      Plan   · Mechanical ventilation management: I placed him again on pressure support 7. Performed NIF and it was acceptable around -25- 27. We will continue weaning on Precedex and perform an ABG after 30 minutes and extubate the patient his gas is acceptable. ·   · Broad-spectrum antibiotics vancomycin and meropenem.   Check MRSA screen and if negative discontinue vancomycin  · Continue DuoNeb  · Plan for cath per cardiology when respiratory status improves    Vesna Espinal  Pulmonary, Critical care and sleep medicine  03/23/20  2:19 PM            This note was dictated utilizing Dragon dictation

## 2020-03-23 NOTE — PROGRESS NOTES
Results for Violet Mercado (MRN [id number]) as of 3/23/2020 16:06   Ref.  Range 3/23/2020 16:04   Hemoglobin, Art, Extended Latest Ref Range: 14.0 - 18.0 g/dL 8.8 (L)   pH, Arterial Latest Ref Range: 7.350 - 7.450  7.450   pCO2, Arterial Latest Ref Range: 35.0 - 45.0 mmHg 44.0   pO2, Arterial Latest Ref Range: 80.0 - 100.0 mmHg 67.0 (L)   HCO3, Arterial Latest Ref Range: 22.0 - 26.0 mmol/L 30.6 (H)   Base Excess, Arterial Latest Ref Range: -2.0 - 2.0 mmol/L 6.0 (H)   O2 Sat, Arterial Latest Ref Range: >92 % 93.6   O2 Content, Arterial Latest Ref Range: Not Established mL/dL 11.7   Methemoglobin, Arterial Latest Ref Range: <1.5 % 1.1   Carboxyhgb, Arterial Latest Ref Range: 0.0 - 5.0 % 2.4     CPAP 5 of peep 7 PS and 35% FIO2 x 40 minutes  Site LR  AT +

## 2020-03-23 NOTE — PROGRESS NOTES
Palliative care follow up. Nurse report. Pt remains on vent, day 12. Pt has not samantha weaning trials, however today better but has excess secretions. Awaiting pulmonology to see pt today. Palliative following.   Electronically signed by Ginna Salas RN on 3/23/2020 at 3:32 PM

## 2020-03-24 ENCOUNTER — OUTSIDE FACILITY SERVICE (OUTPATIENT)
Dept: PULMONOLOGY | Facility: CLINIC | Age: 70
End: 2020-03-24

## 2020-03-24 ENCOUNTER — APPOINTMENT (OUTPATIENT)
Dept: GENERAL RADIOLOGY | Age: 70
DRG: 207 | End: 2020-03-24
Attending: INTERNAL MEDICINE
Payer: MEDICARE

## 2020-03-24 LAB
ALBUMIN SERPL-MCNC: 2.6 G/DL (ref 3.5–5.2)
ALP BLD-CCNC: 59 U/L (ref 40–130)
ALT SERPL-CCNC: 25 U/L (ref 5–41)
ANION GAP SERPL CALCULATED.3IONS-SCNC: 12 MMOL/L (ref 7–19)
APTT: 107.4 SEC (ref 26–36.2)
APTT: 37.3 SEC (ref 26–36.2)
APTT: 60.1 SEC (ref 26–36.2)
AST SERPL-CCNC: 58 U/L (ref 5–40)
BASOPHILS ABSOLUTE: 0 K/UL (ref 0–0.2)
BASOPHILS RELATIVE PERCENT: 0.3 % (ref 0–1)
BILIRUB SERPL-MCNC: 0.5 MG/DL (ref 0.2–1.2)
BUN BLDV-MCNC: 28 MG/DL (ref 8–23)
CALCIUM SERPL-MCNC: 8.5 MG/DL (ref 8.8–10.2)
CHLORIDE BLD-SCNC: 100 MMOL/L (ref 98–111)
CO2: 27 MMOL/L (ref 22–29)
CREAT SERPL-MCNC: 0.6 MG/DL (ref 0.5–1.2)
CULTURE, RESPIRATORY: ABNORMAL
CULTURE, RESPIRATORY: ABNORMAL
EKG P AXIS: 65 DEGREES
EKG P-R INTERVAL: 162 MS
EKG Q-T INTERVAL: 354 MS
EKG QRS DURATION: 140 MS
EKG QTC CALCULATION (BAZETT): 436 MS
EKG T AXIS: 48 DEGREES
EOSINOPHILS ABSOLUTE: 0 K/UL (ref 0–0.6)
EOSINOPHILS RELATIVE PERCENT: 0.1 % (ref 0–5)
GFR NON-AFRICAN AMERICAN: >60
GLUCOSE BLD-MCNC: 133 MG/DL (ref 70–99)
GLUCOSE BLD-MCNC: 164 MG/DL (ref 70–99)
GLUCOSE BLD-MCNC: 182 MG/DL (ref 70–99)
GLUCOSE BLD-MCNC: 186 MG/DL (ref 70–99)
GLUCOSE BLD-MCNC: 190 MG/DL (ref 70–99)
GLUCOSE BLD-MCNC: 198 MG/DL (ref 74–109)
GLUCOSE BLD-MCNC: 399 MG/DL (ref 70–99)
GRAM STAIN RESULT: ABNORMAL
HCT VFR BLD CALC: 26.5 % (ref 42–52)
HEMOGLOBIN: 8.5 G/DL (ref 14–18)
IMMATURE GRANULOCYTES #: 0.1 K/UL
LYMPHOCYTES ABSOLUTE: 0.5 K/UL (ref 1.1–4.5)
LYMPHOCYTES RELATIVE PERCENT: 6.9 % (ref 20–40)
MAGNESIUM: 2.2 MG/DL (ref 1.6–2.4)
MCH RBC QN AUTO: 28.5 PG (ref 27–31)
MCHC RBC AUTO-ENTMCNC: 32.1 G/DL (ref 33–37)
MCV RBC AUTO: 88.9 FL (ref 80–94)
MONOCYTES ABSOLUTE: 0.1 K/UL (ref 0–0.9)
MONOCYTES RELATIVE PERCENT: 1.7 % (ref 0–10)
NEUTROPHILS ABSOLUTE: 6.5 K/UL (ref 1.5–7.5)
NEUTROPHILS RELATIVE PERCENT: 90.2 % (ref 50–65)
ORGANISM: ABNORMAL
ORGANISM: ABNORMAL
PDW BLD-RTO: 16.4 % (ref 11.5–14.5)
PERFORMED ON: ABNORMAL
PLATELET # BLD: 251 K/UL (ref 130–400)
PMV BLD AUTO: 9.8 FL (ref 9.4–12.4)
POTASSIUM SERPL-SCNC: 3.6 MMOL/L (ref 3.5–5)
RBC # BLD: 2.98 M/UL (ref 4.7–6.1)
SODIUM BLD-SCNC: 139 MMOL/L (ref 136–145)
TOTAL PROTEIN: 6.2 G/DL (ref 6.6–8.7)
TROPONIN: 0.12 NG/ML (ref 0–0.03)
TROPONIN: 0.61 NG/ML (ref 0–0.03)
TROPONIN: 0.62 NG/ML (ref 0–0.03)
URINE CULTURE, ROUTINE: ABNORMAL
URINE CULTURE, ROUTINE: ABNORMAL
VANCOMYCIN TROUGH: 20.2 UG/ML (ref 10–20)
WBC # BLD: 7.2 K/UL (ref 4.8–10.8)

## 2020-03-24 PROCEDURE — 2580000003 HC RX 258: Performed by: INTERNAL MEDICINE

## 2020-03-24 PROCEDURE — 80053 COMPREHEN METABOLIC PANEL: CPT

## 2020-03-24 PROCEDURE — 94640 AIRWAY INHALATION TREATMENT: CPT

## 2020-03-24 PROCEDURE — 82947 ASSAY GLUCOSE BLOOD QUANT: CPT

## 2020-03-24 PROCEDURE — 2700000000 HC OXYGEN THERAPY PER DAY

## 2020-03-24 PROCEDURE — 2500000003 HC RX 250 WO HCPCS: Performed by: INTERNAL MEDICINE

## 2020-03-24 PROCEDURE — 6370000000 HC RX 637 (ALT 250 FOR IP): Performed by: HOSPITALIST

## 2020-03-24 PROCEDURE — 6370000000 HC RX 637 (ALT 250 FOR IP): Performed by: INTERNAL MEDICINE

## 2020-03-24 PROCEDURE — 85730 THROMBOPLASTIN TIME PARTIAL: CPT

## 2020-03-24 PROCEDURE — 2140000000 HC CCU INTERMEDIATE R&B

## 2020-03-24 PROCEDURE — 6360000002 HC RX W HCPCS: Performed by: HOSPITALIST

## 2020-03-24 PROCEDURE — 92522 EVALUATE SPEECH PRODUCTION: CPT

## 2020-03-24 PROCEDURE — 92610 EVALUATE SWALLOWING FUNCTION: CPT

## 2020-03-24 PROCEDURE — C9113 INJ PANTOPRAZOLE SODIUM, VIA: HCPCS | Performed by: INTERNAL MEDICINE

## 2020-03-24 PROCEDURE — 71045 X-RAY EXAM CHEST 1 VIEW: CPT

## 2020-03-24 PROCEDURE — 99233 SBSQ HOSP IP/OBS HIGH 50: CPT | Performed by: INTERNAL MEDICINE

## 2020-03-24 PROCEDURE — 99232 SBSQ HOSP IP/OBS MODERATE 35: CPT | Performed by: INTERNAL MEDICINE

## 2020-03-24 PROCEDURE — 84484 ASSAY OF TROPONIN QUANT: CPT

## 2020-03-24 PROCEDURE — 83735 ASSAY OF MAGNESIUM: CPT

## 2020-03-24 PROCEDURE — 85025 COMPLETE CBC W/AUTO DIFF WBC: CPT

## 2020-03-24 PROCEDURE — 6360000002 HC RX W HCPCS: Performed by: INTERNAL MEDICINE

## 2020-03-24 PROCEDURE — 80202 ASSAY OF VANCOMYCIN: CPT

## 2020-03-24 RX ORDER — CLOPIDOGREL BISULFATE 75 MG/1
75 TABLET ORAL DAILY
Status: DISCONTINUED | OUTPATIENT
Start: 2020-03-24 | End: 2020-03-27 | Stop reason: HOSPADM

## 2020-03-24 RX ORDER — LISINOPRIL 5 MG/1
5 TABLET ORAL DAILY
Status: DISCONTINUED | OUTPATIENT
Start: 2020-03-24 | End: 2020-03-27 | Stop reason: HOSPADM

## 2020-03-24 RX ORDER — IPRATROPIUM BROMIDE AND ALBUTEROL SULFATE 2.5; .5 MG/3ML; MG/3ML
1 SOLUTION RESPIRATORY (INHALATION) 4 TIMES DAILY
Status: DISCONTINUED | OUTPATIENT
Start: 2020-03-25 | End: 2020-03-27 | Stop reason: HOSPADM

## 2020-03-24 RX ORDER — NITROGLYCERIN 20 MG/100ML
10 INJECTION INTRAVENOUS CONTINUOUS
Status: DISCONTINUED | OUTPATIENT
Start: 2020-03-24 | End: 2020-03-25

## 2020-03-24 RX ADMIN — ASPIRIN 81 MG 81 MG: 81 TABLET ORAL at 09:59

## 2020-03-24 RX ADMIN — CLOPIDOGREL BISULFATE 75 MG: 75 TABLET ORAL at 11:58

## 2020-03-24 RX ADMIN — METOPROLOL TARTRATE 12.5 MG: 25 TABLET, FILM COATED ORAL at 20:56

## 2020-03-24 RX ADMIN — MEROPENEM 1 G: 1 INJECTION, POWDER, FOR SOLUTION INTRAVENOUS at 05:24

## 2020-03-24 RX ADMIN — METHYLPREDNISOLONE SODIUM SUCCINATE 125 MG: 125 INJECTION, POWDER, FOR SOLUTION INTRAMUSCULAR; INTRAVENOUS at 05:40

## 2020-03-24 RX ADMIN — NITROGLYCERIN 10 MCG/MIN: 20 INJECTION INTRAVENOUS at 16:12

## 2020-03-24 RX ADMIN — VANCOMYCIN HYDROCHLORIDE 1750 MG: 1 INJECTION, POWDER, LYOPHILIZED, FOR SOLUTION INTRAVENOUS at 09:58

## 2020-03-24 RX ADMIN — SODIUM CHLORIDE, PRESERVATIVE FREE 10 ML: 5 INJECTION INTRAVENOUS at 21:07

## 2020-03-24 RX ADMIN — INSULIN LISPRO 1 UNITS: 100 INJECTION, SOLUTION INTRAVENOUS; SUBCUTANEOUS at 20:55

## 2020-03-24 RX ADMIN — HEPARIN SODIUM 4000 UNITS: 1000 INJECTION INTRAVENOUS; SUBCUTANEOUS at 00:08

## 2020-03-24 RX ADMIN — PANTOPRAZOLE SODIUM 40 MG: 40 INJECTION, POWDER, FOR SOLUTION INTRAVENOUS at 09:58

## 2020-03-24 RX ADMIN — IPRATROPIUM BROMIDE AND ALBUTEROL SULFATE 1 AMPULE: 2.5; .5 SOLUTION RESPIRATORY (INHALATION) at 10:52

## 2020-03-24 RX ADMIN — SODIUM CHLORIDE, PRESERVATIVE FREE 10 ML: 5 INJECTION INTRAVENOUS at 09:59

## 2020-03-24 RX ADMIN — INSULIN LISPRO 1 UNITS: 100 INJECTION, SOLUTION INTRAVENOUS; SUBCUTANEOUS at 08:00

## 2020-03-24 RX ADMIN — METOPROLOL TARTRATE 12.5 MG: 25 TABLET, FILM COATED ORAL at 09:59

## 2020-03-24 RX ADMIN — IPRATROPIUM BROMIDE AND ALBUTEROL SULFATE 1 AMPULE: 2.5; .5 SOLUTION RESPIRATORY (INHALATION) at 07:05

## 2020-03-24 RX ADMIN — IPRATROPIUM BROMIDE AND ALBUTEROL SULFATE 1 AMPULE: 2.5; .5 SOLUTION RESPIRATORY (INHALATION) at 15:10

## 2020-03-24 RX ADMIN — NITROGLYCERIN 5 MCG/MIN: 20 INJECTION INTRAVENOUS at 00:03

## 2020-03-24 RX ADMIN — ATORVASTATIN CALCIUM 40 MG: 40 TABLET, FILM COATED ORAL at 20:56

## 2020-03-24 RX ADMIN — LISINOPRIL 5 MG: 5 TABLET ORAL at 11:58

## 2020-03-24 RX ADMIN — IPRATROPIUM BROMIDE AND ALBUTEROL SULFATE 1 AMPULE: 2.5; .5 SOLUTION RESPIRATORY (INHALATION) at 18:24

## 2020-03-24 RX ADMIN — INSULIN LISPRO 5 UNITS: 100 INJECTION, SOLUTION INTRAVENOUS; SUBCUTANEOUS at 11:52

## 2020-03-24 RX ADMIN — IPRATROPIUM BROMIDE AND ALBUTEROL SULFATE 1 AMPULE: 2.5; .5 SOLUTION RESPIRATORY (INHALATION) at 02:06

## 2020-03-24 ASSESSMENT — PAIN DESCRIPTION - DESCRIPTORS: DESCRIPTORS: PRESSURE

## 2020-03-24 ASSESSMENT — PAIN SCALES - GENERAL
PAINLEVEL_OUTOF10: 0
PAINLEVEL_OUTOF10: 1
PAINLEVEL_OUTOF10: 0

## 2020-03-24 ASSESSMENT — ENCOUNTER SYMPTOMS
SHORTNESS OF BREATH: 0
GASTROINTESTINAL NEGATIVE: 1

## 2020-03-24 ASSESSMENT — PAIN DESCRIPTION - LOCATION: LOCATION: CHEST

## 2020-03-24 NOTE — PROGRESS NOTES
[address]    Daily HOSPITAL Progress Note                            Date:  3/24/20  Patient: Florin Gaffney  Admission:  3/11/2020  5:38 PM  Admit DX: NSTEMI (non-ST elevated myocardial infarction) [facility]) [I21.4]  Age:  81 y.o., 1950        Date of Admission 3/11/2020  5:38 PM   Hospital Length of Stay  LOS: 13 days            I personally saw the patient and rounded with:  Hola Mckenzie RN Nurse Navigator on 3/24/20    The observations documented in this note, including the assessment   and plan are mine    Portions of this note have been copied forward, from prior notes, yet modified, to reflect today's clinical status of this patient. Reason for initial evaluation or the patient's initial complaint    1. Reason for Hospital Admission: dyspnea        2. Reason for Cardiology Consultation: Ischemic cardiomyopathy         SUBJECTIVE:      Chief Complaint / Reason for the Visit   Follow up of:  Dyspnea / acute respiratory failure and ischemic cardiomyopathy and systemic arterial hypertension    Family present and in room during examination:  No    At the time of the examination, the patient was:  extubated      Specialty Problems        Cardiology Problems    STEMI (ST elevation myocardial infarction) ([facility] [address]        Chest pain        Syncope and collapse        Hypertension        NSTEMI (non-ST elevated myocardial infarction) ([facility] [address]        Acute ST elevation myocardial infarction (STEMI) involving left anterior descending (LAD) coronary artery (HCC)        CAD (coronary artery disease)              Current Status Today According to the patient:  \"0\"    Subjective:  Mr. Florin Gaffney is generally feeling unchanged. extubated    Mr. Florin Gaffney has the following cardiac complaints / symptoms today:    1. dyspnea, extubated    2. Ischemic cardiomyathy, the ekg shows anterior ST segment depression    3.  Hypertension    The blood pressure for the lastr 36 hours has been:  Systolic (52HIG), GQN:078 , Min:87 , PFI:607    Diastolic (33YIO), NUE:08, Min:45, Max:88        Jeana Day is a 81 y.o. male with the following history as recorded in [facility]:    Patient Active Problem List    Diagnosis Date Noted    Palliative care patient 03/12/2020     Priority: Low    NSTEMI (non-ST elevated myocardial infarction) ([facility] 03/11/2020     Priority: Low    COPD exacerbation ([facility] 04/18/2019     Priority: Low    Hypertension 04/18/2019     Priority: Low    Syncope and collapse 04/17/2019     Priority: Low    Chest pain 11/14/2018     Priority: Low    STEMI (ST elevation myocardial infarction) ([facility] 06/05/2018     Priority: Low    CAD (coronary artery disease) 06/05/2018    Acute ST elevation myocardial infarction (STEMI) involving left anterior descending (LAD) coronary artery (HCC)     Electronic cigarette use      Current Facility-Administered Medications   Medication Dose Route Frequency Provider Last Rate Last Dose    lisinopril (PRINIVIL;ZESTRIL) tablet 5 mg  5 mg Oral Daily Jomar Millin, DO   5 mg at 03/24/20 1158    clopidogrel (PLAVIX) tablet 75 mg  75 mg Oral Daily Jomar Millin, DO   75 mg at 03/24/20 1158    ipratropium-albuterol (DUONEB) nebulizer solution 1 ampule  1 ampule Inhalation Q4H Axel Robertson MD   1 ampule at 03/24/20 1052    heparin (porcine) injection 4,000 Units  4,000 Units Intravenous PRN Daron Price MD        heparin (porcine) injection 2,000 Units  2,000 Units Intravenous PRN Daron Price MD        nitroGLYCERIN (NITROSTAT) SL tablet 0.4 mg  0.4 mg Sublingual Q5 Min PRN Marinda Meigs, MD        acetaminophen (TYLENOL) 160 MG/5ML solution 650 mg  650 mg Oral Q4H PRN Melia Galeano MD   650 mg at 03/22/20 0504    potassium chloride 20 mEq/50 mL IVPB (Central Line)  20 mEq Intravenous PRN Jomar Millin, DO 50 mL/hr at 03/23/20 0633 20 mEq at 03/23/20 0633    lidocaine PF 1 % injection 5 mL  5 mL Intradermal Once CARDIOLOGY PROBLEMS ARE LISTED ABOVE; HOWEVER, THE FOLLOWING SPECIFIC CARDIAC PROBLEMS / CONDITIONS WERE ADDRESSED AND TREATED DURING THE OFFICE VISIT TODAY:                                                                                            MEDICAL DECISION MAKING                   Cardiac Specific Problem / Diagnosis   Discussion and Data Reviewed Diagnostic Procedures Ordered Management Options Selected                 1. Presenting problem / symptom     Probable non Q wave MI with resulting acute respiratory failure  show no change    The patient is a possible candidate for a re cath. Rekha Grayson reviewed his films today and I did place stents I the LAD at the time of his anterior MI. Goldsmith Hiss were bare metal stents.  It is possible that they could have restenosed or his had disease progression in the other diffusely diseased arteries.   No Continue current medications:      Yes:                  2. CAD Initial presentation during this evaluation    Review and summation of old records:     2000 stents  18  Anterior STEMI, AUC indication 2, AUC score 7  18  Cath 99% mid LAD, 2.5 x 18 and 2.5 x 28 BMS, diffuse CAD, anterior lateral dyskinesis, EF 40%  3/13/2020  Echo  Normal LVFX    No Continue current medications:     Yes:                  3.  Concern regarding systemic blood pressure Initial presentation during this evaluation The blood pressure for the lastr 36 hours has been:  Systolic (27MIW), QM , Min:87 , ZMO:829    Diastolic (49JUS), ALFA:16, Min:45, Max:88                No Continue current medications:        yes         Summary of hospital course thus far:        Date Clinical Event Plan of Treatment           20 Reviewed films from 2018 Discussion regarding goal of this clinical course   20    Awaiting weaning trial and decision on clinical course As noted   20    Weaning trial of the vent  Depends on clinical course    20    Weaning on the ventilator Hopefully cath when off vent    03/20/20     On vent, wean trial Cath when off vent    03/24/20    EKG with anterior ST segment depression  will add IV nitro, already on beta blocker                     PLAN:     1. Continue present medications except for changes as noted above  2. Continue to monitor rhythm  3.  Further orders per clinical course.        Discussed with nursing.     Electronically signed by Yonathan Hook MD on 03/24/20   [facility]

## 2020-03-24 NOTE — PROGRESS NOTES
Pulmonary and Critical Care Progress Note [facility]   MR# [id number]  Acct# [de-identified]  3/24/2020   9:56 AM CDT   Referring Provider: Ashtyn Land DO    Chief Complaint: S/p extubation    HPI: Patient was extubated yesterday and is currently on 2 L nasal cannula oxygen with O2 sat of 96%. Heparin and nitro drips infusing. Per his nurse Best Thomas, he failed his speech eval this morning. He continues in isolation for RSV. He denies feeling short of breath. There is no family at bedside. Past Medical History  Past Medical History:   Diagnosis Date    CAD (coronary artery disease)     Chest pain 11/14/2018    CHF (congestive heart failure) (HCC)     COPD [facility]chronic obstructive pulmonary disease) [facility])     Hyperlipidemia     Hypertension     Palliative care patient 03/12/2020    Pneumonia      Surgical History  Past Surgical History:   Procedure Laterality Date    APPENDECTOMY      BACK SURGERY      COLONOSCOPY      ENDOSCOPY, COLON, DIAGNOSTIC       Allergies  No Known Allergies    Medications    vancomycin, 1,750 mg, Intravenous, Q18H    methylPREDNISolone, 125 mg, Intravenous, Q8H    ipratropium-albuterol, 1 ampule, Inhalation, Q4H    meropenem, 1 g, Intravenous, Q8H    vancomycin (VANCOCIN) intermittent dosing (placeholder), , Other, RX Placeholder    lidocaine 1 % injection, 5 mL, Intradermal, Once    sodium chloride flush, 10 mL, Intravenous, 2 times per day    metoprolol tartrate, 12.5 mg, Oral, BID    pantoprazole, 40 mg, Intravenous, Daily    insulin lispro, 0-6 Units, Subcutaneous, TID WC    insulin lispro, 0-3 Units, Subcutaneous, Nightly    aspirin, 81 mg, Oral, Daily    atorvastatin, 40 mg, Oral, Nightly     Social History   reports that he quit smoking about 21 months ago. His smoking use included cigarettes. He has a 50.00 pack-year smoking history. His smokeless tobacco use includes chew. He reports previous alcohol use.  He reports that he does not use drugs. Family History  family history includes Cancer in his brother and sister. Review of Systems:  Review of Systems   Respiratory: Negative for shortness of breath. Cardiovascular: Negative. Gastrointestinal: Negative. Neurological: Positive for weakness. Physical Exam:   height is 6' (1.829 m) and weight is 218 lb 3.2 oz (99 kg). His axillary temperature is 98.5 °F (36.9 °C). His blood pressure is 125/88 and his pulse is 87. His respiration is 25 and oxygen saturation is 100%. Intake/Output Summary (Last 24 hours) at 3/24/2020 9076  Last data filed at 3/24/2020 0600  Gross per 24 hour   Intake 1354.46 ml   Output 2860 ml   Net -1505.54 ml           Physical Exam  Vitals signs and nursing note reviewed. Constitutional:       General: He is not in acute distress. Appearance: He is not toxic-appearing or diaphoretic. Interventions: Nasal cannula in place. HENT:      Head: Normocephalic and atraumatic. Nose: Nose normal.      Comments: NG with tube feeds  Eyes:      General: No scleral icterus. Cardiovascular:      Rate and Rhythm: Normal rate. Heart sounds: No murmur. No friction rub. No gallop. Pulmonary:      Effort: Pulmonary effort is normal. No accessory muscle usage or respiratory distress. Breath sounds: No decreased air movement. Examination of the right-lower field reveals decreased breath sounds. Examination of the left-lower field reveals decreased breath sounds. Decreased breath sounds present. No wheezing or rhonchi. Abdominal:      General: Abdomen is flat. There is no distension. Palpations: Abdomen is soft. Genitourinary:     Comments: Asher catheter  Skin:     General: Skin is warm and dry. Neurological:      Mental Status: He is alert.        Recent laboratory:  Recent Labs     03/22/20  0320 03/23/20  0330 03/24/20  0600   WBC 7.8 8.7 7.2   RBC 3.58* 2.96* 2.98*   HGB 10.5* 8.4* 8.5*   HCT 33.0* 26.9* 26.5*  209 251   MCV 92.2 90.9 88.9   MCH 29.3 28.4 28.5   MCHC 31.8* 31.2* 32.1*   RDW 16.5* 16.6* 16.4*      Recent Labs     03/22/20  0320  03/23/20  0330  03/23/20  1604 03/23/20  2256 03/24/20  0600     --  134*  --   --   --  139   K 3.6   < > 3.4*   < > 3.4 3.1 3.6   CL 98  --  97*  --   --   --  100   CO2 27  --  27  --   --   --  27   BUN 18  --  25*  --   --   --  28*   CREATININE 0.7  --  0.8  --   --   --  0.6   CALCIUM 8.3*  --  8.2*  --   --   --  8.5*   GLUCOSE 107  --  166*  --   --   --  198*    < > = values in this interval not displayed. Recent Labs     03/23/20  0420 03/23/20  1604 03/23/20  2256   PHART 7.440 7.450 7.480*   JSL9BGQ 48.0* 44.0 42.0   PO2ART 70.0* 67.0* 60.0*   ZJB7VBW 32.6* 30.6* 31.3*   Y3SYNMPR 93.9 93.6 92.8   BEART 7.6* 6.0* 7.1*     Recent Labs     03/23/20  2359 03/24/20  0600   AST  --  58*   ALT  --  25   ALKPHOS  --  59   BILITOT  --  0.5   MG  --  2.2   CALCIUM  --  8.5*   TROPONINI 0.12*  --      Radiograph: No new today    Problem list as generated by [employer]:  Patient Active Problem List   Diagnosis    CAD (coronary artery disease)    Acute ST elevation myocardial infarction (STEMI) involving left anterior descending (LAD) coronary artery (HCC)    Electronic cigarette use    STEMI (ST elevation myocardial infarction) ([facility] [address]    Chest pain    Syncope and collapse    [facility]    Hypertension    NSTEMI (non-ST elevated myocardial infarction) [facility]    Palliative care patient     Pulmonary Assessment:    1. Acute on chronic hypoxic respiratory failure, extubated 3-23-20  2. RSV pneumonia  3. Stenotrophomonas colonization  4. Nstemi, with a history of coronary artery disease  5. Severe, stage III COPD with coexisting restrictive disease and severely reduced diffusion capacity.    6. Congestive heart failure appears improved     Recommend/plan:     · Continue supplemental oxygen to keep O2 sats greater than 92%  · Continue bronchodilators  · Wean down steroids as tolerated per attending  · Plans for cardiac cath once patient is deemed stable  · Palliative care is following  · He will need a lot of rehab with PT/OT/speech  · Will need a follow-up chest x-ray at some point we will defer to others. Electronically signed by Chelita Montes on 3/24/2020 at 8:08 AM    Physician Substantive portion:  Patient is without new complaints, extubated, weak. Plans for moving out of ICU today. Remains on antibiotics. Exam shows no distress, awake alert, no accessory muscle use. Weak and appears ill and fatigued. Recommend monitor off antibiotics at this point. Mobilize as tolerated. Cardiac work-up per cardiology. To floor. No additional pulmonary plans, wean off oxygen as tolerated. We will sign off, available. I have seen and examined patient personally, performing a face-to-face diagnostic evaluation with plan of care reviewed and developed with APRN and nursing staff. I have addended and/or modified the above history of present illness, physical examination, and assessment and plan to reflect my findings and impressions. Essential elements of the care plan were discussed with APRN above. Agree with findings and assessment/plan as documented above.     Electronically signed by Alexander Bowman on 3/24/2020, 3:05 PM

## 2020-03-24 NOTE — PROGRESS NOTES
Speech Language Pathology  Facility/Department: [facility] CORONARY CARE UNIT   CLINICAL BEDSIDE SWALLOW EVALUATION and Speech Language Cognitive Evaluation    NAME: Uche Rojas  : 1950  MRN: [id number]  ADMISSION DATE: 3/11/2020   Date of Eval: 3/24/2020  Evaluating Therapist: Kenn Mcfarland MS CCC-SLP    ADMITTING DIAGNOSIS: has CAD (coronary artery disease); Acute ST elevation myocardial infarction (STEMI) involving left anterior descending (LAD) coronary artery ([facility] [address] Electronic cigarette use; STEMI (ST elevation myocardial infarction) ([facility] Chest pain; Syncope and collapse; COPD exacerbation ([facility] [address] Hypertension; NSTEMI (non-ST elevated myocardial infarction) [facility]); and Palliative care patient on their problem list.    History of Present Illness:  Per physician: Uche Rojas is a 81 y.o. male  whom we are following for hypoxemic respiratory failure, non-ST segment elevation MI, COPD. Recent Chest Xray/CT of Chest:  Narrative   XR CHEST PORTABLE 3/23/2020 3:30 AM   HISTORY:   Respiratory distress     Single view. COMPARISONS:  3/22/2020   FINDINGS:   Endotracheal tube and NG tube again identified. Right-sided PICC line   again observed. There are mild vague increased opacities identified the left lung base   particularly, differential considerations include atelectasis. Acute   infiltration from infectious or inflammatory process not excluded. The heart is normal in size without evidence of heart failure or   volume overload.    Radiographically, the chest is unchanged.       Impression   Stable 1 day appearance of the chest.   Signed by Dr Bhavik Ortega on 3/23/2020 7:54 AM           Current Diet level:  Current Diet : NPO  Current Liquid Diet : NPO    Pain:  Pain Assessment  Pain Assessment: 0-10  Pain Level: 0  Patient's Stated Pain Goal: No pain  Pain Type: Acute pain  Pain Location: Chest  Pain Orientation: Mid  Pain Descriptors: Pressure  Non-Pharmaceutical Pain Intervention(s): Relaxation techniques, Repositioned  Response to Pain Intervention: Patient Satisfied  RASS Score: Alert and calm  Pain Assessment/FLACC  Pain Rating: FLACC (rest) - Face: no particular expression or smile  Pain Rating: FLACC (rest) - Legs: normal position or relaxed  Pain Rating: FLACC (rest) - Activity: lying quietly, normal position, moves easily  Pain Rating: FLACC (rest) - Cry: no cry (awake or asleep)  Pain Rating: FLACC (rest) - Consolability: content, relaxed  Score: FLACC (rest): 0    Reason for Referral  Marlys Strickland was referred for a bedside swallow evaluation to assess the efficiency of his swallow function, identify signs and symptoms of aspiration and make recommendations regarding safe dietary consistencies, effective compensatory strategies, and safe eating environment. Impression  Dysphagia Impression : Mild oral phase dysphagia and moderate pharyngeal phase dysphagia due to lengthy intubation, overt s/s of aspiration at the bedside, decreased vocal intensity, elevated temperature and acute infiltration from infectious or inflammatory process noted on a CXR on 3/23/20. He is recommended to continue primary means of nutrition, hydration and medications via NG tube. Per his nurse, he does have some necessary p.o. meds. Recommend these be crushed and presented in applesauce or pudding,    Dysphagia Outcome Severity Scale: Level 3: Moderate dysphagia- Total assisstance, supervision or strategies. Two or more diet consistencies restricted     Dysphagia Diagnosis: Mild oral stage dysphagia; Moderate pharyngeal stage dysphagia    Treatment Plan  Requires SLP Intervention: Yes  Duration/Frequency of Treatment: 1-2x/day, 3-5x/week   D/C Recommendations: To be determined       Recommended Diet and Intervention  Diet Solids Recommendation: NPO  Liquid Consistency Recommendation: NPO        Therapeutic Interventions: Oral care; Patient/Family education; Therapeutic PO trials with SLP    Treatment/Goals  Short-term Goals  Goal 1: The patient will remain NPO. Goal 2: Staff/caregivers will complete oral hygiene at least two times daily to prevent aspiration of oral bacteria. Goal 3: SLP will return to re-assess swallow function and determine if po intake will be possible. General  Chart Reviewed: Yes  Behavior/Cognition: Alert; Cooperative;Pleasant mood  Temperature Spikes Noted: Yes  Respiratory Status: O2 via nasual cannula  Breath Sounds: (Nurse reports diminished lung sounds)  O2 Device: Nasal cannula  Liters of Oxygen: 2 L  Communication Observation: Dysarthria;Aphasia  Follows Directions: Simple  Dentition: Edentulous(Reports U/L dentures are at home)  Patient Positioning: Upright in bed  Baseline Vocal Quality: Weak  Volitional Cough: Strong(Strong throat clear)  Volitional Swallow: Delayed  Consistencies Administered: Dysphagia Pureed (Dysphagia I); Thin - cup; Thin - straw;Nectar - straw;Nectar - cup;Honey - cup  WBC: 7.2  Intubation: 3/11/20  Extubation: 3/23/20  NG placement: 3/13/20    Oral Motor Deficits  Oral/Motor  Oral Motor: (No lingual deviation, no labial asymmetry. Edentulous)    Oral Phase Dysfunction  Oral Phase  Oral Phase: Exceptions  Oral Phase  Oral Phase - Comment: Mild oral phase dysphagia noted due to anterior labial loss. Minimal oral residue observed. Suspect some premature loss of the bolus over the tongue base. Indicators of Pharyngeal Phase Dysfunction   Pharyngeal Phase  Pharyngeal Phase: Exceptions  Pharyngeal Phase   Pharyngeal: Suspect moderate pharyngeal phase dysphagia due to decreased hyolaryngeal elevation, delayed swallow response, overt s/s of aspiration observed with every presentation. Thin liquids via tsp, cup and straw all resulted in immediate or delayed cough response. Nectar thick liquids via cup and straw all resulted in immediate or delayed cough response.  Honey thick liquids via cup and tsp resulted in immediate or delayed cough

## 2020-03-24 NOTE — PROGRESS NOTES
Consult received and Palliative has been following this patient throughout hospitalization. We will continue to follow and assist as needed.

## 2020-03-24 NOTE — PROGRESS NOTES
Pharmacy Vancomycin Consult     Vancomycin Day: 3  Current Dosinmg Q12hr    Temp max:  102.7    Recent Labs     20  0320 20  0330   BUN 18 25*       Recent Labs     20  0320 20  0330   CREATININE 0.7 0.8       Recent Labs     20  0320 20  0330   WBC 7.8 8.7         Intake/Output Summary (Last 24 hours) at 3/24/2020 0227  Last data filed at 3/24/2020 0200  Gross per 24 hour   Intake 2255.65 ml   Output 3500 ml   Net -1244.35 ml       Culture Date Source Results   20 Urine Streptococcus   20 Resp Gram- rods  Stenotrophomonas maltophilia   20 Blood No growth       Ht Readings from Last 1 Encounters:   20 6' (1.829 m)        Wt Readings from Last 1 Encounters:   20 220 lb 8 oz (100 kg)         Body mass index is 29.91 kg/m². Estimated Creatinine Clearance: 110 mL/min (based on SCr of 0.8 mg/dL). Trough: 20.2    Assessment/Plan: Changed Vancomycin to 1750mg Q18hr with trough prior to 4rth dose.  Hold if >20    Electronically signed by MILIND Nascimento [facility] on 3/24/2020 at 2:27 AM

## 2020-03-24 NOTE — PLAN OF CARE
Problem: Falls - Risk of:  Goal: Will remain free from falls  Description: Will remain free from falls  Outcome: Ongoing  Goal: Absence of physical injury  Description: Absence of physical injury  Outcome: Ongoing     Problem: Infection - Ventilator-Associated Pneumonia:  Goal: Prevent a ventilator associated event (VAE)  Description: Prevent a ventilator associated event (VAE)  Outcome: Ongoing  Goal: Absence of pulmonary infection  Description: Absence of pulmonary infection  Outcome: Ongoing     Problem: Urinary Elimination:  Goal: Signs and symptoms of infection will decrease  Description: Signs and symptoms of infection will decrease  Outcome: Ongoing  Goal: Complications related to the disease process, condition or treatment will be avoided or minimized  Description: Complications related to the disease process, condition or treatment will be avoided or minimized  Outcome: Ongoing     Problem: Breathing Pattern - Ineffective:  Goal: Ability to achieve and maintain a regular respiratory rate will improve  Description: Ability to achieve and maintain a regular respiratory rate will improve  Outcome: Ongoing     Problem: Nutrition  Goal: Optimal nutrition therapy  Description: Nutrition Problem: Inadequate oral intake  Intervention: Food and/or Nutrient Delivery: Continue NPO, Start Tube Feeding  Nutritional Goals: meet nutritional needs via EN     3/24/2020 0357 by Governor DARIANA Benjamin  Outcome: Ongoing  3/23/2020 1409 by Ghazala Ybarra, MS, RD, LD  Outcome: Ongoing     Problem: Cardiac:  Goal: Hemodynamic stability will improve  Description: Hemodynamic stability will improve  Outcome: Ongoing     Problem: Coping:  Goal: Level of anxiety will decrease  Description: Level of anxiety will decrease  Outcome: Ongoing  Goal: Ability to cope will improve  Description: Ability to cope will improve  Outcome: Ongoing  Goal: Ability to establish a method of communication will improve  Description: Ability to establish a method of communication will improve  Outcome: Ongoing     Problem: Nutritional:  Goal: Consumption of the prescribed amount of daily calories will improve  Description: Consumption of the prescribed amount of daily calories will improve  Outcome: Ongoing     Problem: Respiratory:  Goal: Complications related to the disease process, condition or treatment will be avoided or minimized  Description: Complications related to the disease process, condition or treatment will be avoided or minimized  Outcome: Ongoing  Goal: Ability to maintain a clear airway will improve  Description: Ability to maintain a clear airway will improve  Outcome: Ongoing  Goal: Ability to maintain adequate ventilation will improve  Description: Ability to maintain adequate ventilation will improve  Outcome: Ongoing     Problem: Infection - Central Venous Catheter-Associated Bloodstream Infection:  Goal: Will show no infection signs and symptoms  Description: Will show no infection signs and symptoms  Outcome: Ongoing     Problem: Pain:  Goal: Pain level will decrease  Description: Pain level will decrease  Outcome: Ongoing  Goal: Control of acute pain  Description: Control of acute pain  Outcome: Ongoing  Goal: Control of chronic pain  Description: Control of chronic pain  Outcome: Ongoing

## 2020-03-24 NOTE — PROGRESS NOTES
03/24/20 0959    sodium chloride flush 0.9 % injection 10 mL  10 mL Intravenous PRN Diogenes Marrero MD        metoprolol tartrate (LOPRESSOR) tablet 12.5 mg  12.5 mg Oral BID Catie Bhatia MD   12.5 mg at 03/24/20 0959    morphine injection 2 mg  2 mg Intravenous Q1H PRN Jacky Oscar MD   2 mg at 03/23/20 1320    insulin lispro (HUMALOG) injection vial 0-6 Units  0-6 Units Subcutaneous TID WC Diogenes Marrero MD   1 Units at 03/24/20 0800    insulin lispro (HUMALOG) injection vial 0-3 Units  0-3 Units Subcutaneous Nightly Diogenes Marrero MD   1 Units at 03/11/20 2211    aspirin chewable tablet 81 mg  81 mg Oral Daily Diogenes Marrero MD   81 mg at 03/24/20 0959    atorvastatin (LIPITOR) tablet 40 mg  40 mg Oral Nightly Diogenes Marrero MD   40 mg at 03/23/20 2023    glucose (GLUTOSE) 40 % oral gel 15 g  15 g Oral PRN Diogenes Marrero MD        dextrose 50 % IV solution  12.5 g Intravenous PRN Diogenes Marrero MD        glucagon (rDNA) injection 1 mg  1 mg Intramuscular PRN Diogenes Marrero MD        dextrose 5 % solution  100 mL/hr Intravenous PRN Diogenes Marrero MD           Past Medical History:  Past Medical History:   Diagnosis Date    CAD (coronary artery disease)     Chest pain 11/14/2018    [facility] [address]     COPD [facility]chronic obstructive pulmonary disease) [facility] [address]     Hyperlipidemia     Hypertension     Palliative care patient 03/12/2020    Pneumonia        Past Surgical History:  Past Surgical History:   Procedure Laterality Date    APPENDECTOMY      BACK SURGERY      COLONOSCOPY      ENDOSCOPY, COLON, DIAGNOSTIC         Family History  Family History   Problem Relation Age of Onset    Cancer Sister     Cancer Brother        Social History  Social History     Socioeconomic History    Marital status:      Spouse name: Cristal Damian    Number of children: 3    Years of education: 8    Highest education level: Not on file   Occupational History    Not on file Social Needs    Financial resource strain: Not on file    Food insecurity     Worry: Not on file     Inability: Not on file    Transportation needs     Medical: Not on file     Non-medical: Not on file   Tobacco Use    Smoking status: Former Smoker     Packs/day: 1.00     Years: 50.00     Pack years: 50.00     Types: Cigarettes     Last attempt to quit: 2018     Years since quittin.8    Smokeless tobacco: Current User     Types: Chew   Substance and Sexual Activity    Alcohol use: Not Currently     Frequency: Never     Comment: occassional    Drug use: No    Sexual activity: Not on file   Lifestyle    Physical activity     Days per week: Not on file     Minutes per session: Not on file    Stress: Not on file   Relationships    Social connections     Talks on phone: Not on file     Gets together: Not on file     Attends Buddhism service: Not on file     Active member of club or organization: Not on file     Attends meetings of clubs or organizations: Not on file     Relationship status: Not on file    Intimate partner violence     Fear of current or ex partner: Not on file     Emotionally abused: Not on file     Physically abused: Not on file     Forced sexual activity: Not on file   Other Topics Concern    Not on file   Social History Narrative     50 years only marriage    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 rate 19, height 6' (1.829 m), weight 218 lb 3.2 oz (99 kg), SpO2 100 %.     Intake/Output Summary (Last 24 hours) at 3/24/2020 1150  Last data filed at 3/24/2020 0958  Gross per 24 hour   Intake 1459.66 ml   Output 2860 ml   Net -1400.34 ml       Physical Exam  Vitals signs reviewed. Constitutional:       Appearance: He is well-developed. HENT:      Head: Normocephalic and atraumatic. Eyes:      Conjunctiva/sclera: Conjunctivae normal.      Pupils: Pupils are equal, round, and reactive to light. Cardiovascular:      Rate and Rhythm: Normal rate and regular rhythm. Heart sounds: Normal heart sounds. Pulmonary:      Effort: Pulmonary effort is normal.      Breath sounds: Rhonchi present. Skin:     General: Skin is warm and dry. Neurological:      Mental Status: He is alert and oriented to person, place, and time. Labs:  BMP:   Recent Labs     03/22/20  0320  03/23/20  0330  03/23/20  1604 03/23/20  2256 03/24/20  0600     --  134*  --   --   --  139   K 3.6   < > 3.4*   < > 3.4 3.1 3.6   CL 98  --  97*  --   --   --  100   CO2 27  --  27  --   --   --  27   BUN 18  --  25*  --   --   --  28*   CREATININE 0.7  --  0.8  --   --   --  0.6   CALCIUM 8.3*  --  8.2*  --   --   --  8.5*    < > = values in this interval not displayed. CBC:   Recent Labs     03/22/20  0320 03/23/20  0330 03/24/20  0600   WBC 7.8 8.7 7.2   HGB 10.5* 8.4* 8.5*   HCT 33.0* 26.9* 26.5*   MCV 92.2 90.9 88.9    209 251     LIVER PROFILE:   Recent Labs     03/22/20  0320 03/23/20  0330 03/24/20  0600   AST 25 17 58*   ALT 24 17 25   BILITOT 0.6 0.4 0.5   ALKPHOS 69 61 59     PT/INR: No results for input(s): PROTIME, INR in the last 72 hours. APTT:   Recent Labs     03/23/20  1630 03/23/20  2359 03/24/20  0600   APTT 58.9* 37.3* 107.4*     BNP:  No results for input(s): BNP in the last 72 hours. Ionized Calcium:No results for input(s): IONCA in the last 72 hours.   Magnesium:  Recent Labs     03/22/20  0320 03/23/20  0330 03/24/20  0600   MG 1.9 2.1 2.2     Phosphorus:No results for input(s): PHOS in the last 72 hours. HgbA1C: No results for input(s): LABA1C in the last 72 hours. Hepatic:   Recent Labs     03/22/20  0320 03/23/20  0330 03/24/20  0600   ALKPHOS 69 61 59   ALT 24 17 25   AST 25 17 58*   PROT 6.6 5.8* 6.2*   BILITOT 0.6 0.4 0.5   LABALBU 2.9* 2.3* 2.6*     Lactic Acid: No results for input(s): LACTA in the last 72 hours. Troponin: No results for input(s): CKTOTAL, CKMB, TROPONINT in the last 72 hours. ABGs: No results for input(s): PH, PCO2, PO2, HCO3, O2SAT in the last 72 hours. CRP:  No results for input(s): CRP in the last 72 hours. Sed Rate:  No results for input(s): SEDRATE in the last 72 hours. Cultures:   No results for input(s): CULTURE in the last 72 hours. Recent Labs     03/22/20  0455 03/22/20  0510   BC No Growth to date. Any change in status will be called. --    BLOODCULT2  --  No Growth to date. Any change in status will be called. No results for input(s): CXSURG in the last 72 hours. Radiology reports as per the Radiologist  Radiology: Xr Chest Portable    Result Date: 3/12/2020  EXAMINATION: XR CHEST PORTABLE 3/12/2020 7:19 AM HISTORY: XR CHEST PORTABLE 3/12/2020 2:00 AM HISTORY: Intubated COMPARISON: March 11, 2020. FINDINGS: Hyperinflation is present. No infiltrates or consolidation is identified. Cardiac silhouettes normal. Endotracheal tube is present. . The osseous structures and surrounding soft tissues demonstrate no acute abnormality. 1. COPD no acute cardiopulmonary process. Signed by Dr Christine Weiss on 3/12/2020 7:20 AM    Xr Chest Portable    Result Date: 3/11/2020  XR CHEST PORTABLE 3/11/2020 4:45 PM HISTORY:   Respiratory distress  Single view. COMPARISONS:  4/17/2019 chest radiography FINDINGS: Endotracheal tube position above the marcella. NG tube looped in the proximal stomach. The lungs are clear without parenchymal infiltrates. The heart is normal in size, pulmonary circulation appropriate, without heart failure. The bony structures are intact.     1. Line and support tubes well-positioned. 2. No acute cardiopulmonary process. Signed by Dr Monserrat Montiel on 3/11/2020 7:07 PM       Assessment     Non-ST segment elevation MI. Continue medical management.     Exacerbation of COPD. Medical management.     Hypoxemic respiratory failure. Successfully liberated from the ventilator.     RSV pneumonia. Resolved.   DC droplet precautions-it has been 13 days.       Jimena Hunter DO

## 2020-03-24 NOTE — PROGRESS NOTES
Results for Yesica Morse (MRN [id number]) as of 3/23/2020 22:59   Ref.  Range 3/23/2020 22:56   Hemoglobin, Art, Extended Latest Ref Range: 14.0 - 18.0 g/dL 9.3 (L)   pH, Arterial Latest Ref Range: 7.350 - 7.450  7.480 (H)   pCO2, Arterial Latest Ref Range: 35.0 - 45.0 mmHg 42.0   pO2, Arterial Latest Ref Range: 80.0 - 100.0 mmHg 60.0 (L)   HCO3, Arterial Latest Ref Range: 22.0 - 26.0 mmol/L 31.3 (H)   Base Excess, Arterial Latest Ref Range: -2.0 - 2.0 mmol/L 7.1 (H)   O2 Sat, Arterial Latest Ref Range: >92 % 92.8   O2 Content, Arterial Latest Ref Range: Not Established mL/dL 12.2   Methemoglobin, Arterial Latest Ref Range: <1.5 % 0.9   Carboxyhgb, Arterial Latest Ref Range: 0.0 - 5.0 % 4.1       PT ON 6 LPM VIA NC

## 2020-03-24 NOTE — PROGRESS NOTES
Wound Care    Follow up with patient today and his nurse Izzy Gamez RN at bedside. Right arm edematous, purplish discoloration, noted nodule area at [facility], soft to palpate. Left arm ecchymosis noted inner arm and AC. Radial pulses palpable, skin warm and dry, Finger nails jonathan < 3secs. Noted [address] 3/23/20 right extremity \"sub q fluid accumulation may be procedural, a resolving or evolving hematoma\", please see report. May need General Surgery to eval for additional recommendations. Patient able to turn to left side with assist x2, fecal management in place, indwelling urinary catheter in place. Buttocks,  cleft and coccyx skin intact, no redness noted. Call placed to EVS for bed extender. Recommend to continue pressure injury prevention.

## 2020-03-25 LAB
ALBUMIN SERPL-MCNC: 2.7 G/DL (ref 3.5–5.2)
ALP BLD-CCNC: 61 U/L (ref 40–130)
ALT SERPL-CCNC: 51 U/L (ref 5–41)
ANION GAP SERPL CALCULATED.3IONS-SCNC: 12 MMOL/L (ref 7–19)
AST SERPL-CCNC: 88 U/L (ref 5–40)
BASOPHILS ABSOLUTE: 0 K/UL (ref 0–0.2)
BASOPHILS RELATIVE PERCENT: 0.2 % (ref 0–1)
BILIRUB SERPL-MCNC: 0.5 MG/DL (ref 0.2–1.2)
BUN BLDV-MCNC: 31 MG/DL (ref 8–23)
CALCIUM SERPL-MCNC: 8.7 MG/DL (ref 8.8–10.2)
CHLORIDE BLD-SCNC: 103 MMOL/L (ref 98–111)
CO2: 27 MMOL/L (ref 22–29)
CREAT SERPL-MCNC: 0.7 MG/DL (ref 0.5–1.2)
EOSINOPHILS ABSOLUTE: 0 K/UL (ref 0–0.6)
EOSINOPHILS RELATIVE PERCENT: 0 % (ref 0–5)
GFR NON-AFRICAN AMERICAN: >60
GLUCOSE BLD-MCNC: 124 MG/DL (ref 70–99)
GLUCOSE BLD-MCNC: 124 MG/DL (ref 70–99)
GLUCOSE BLD-MCNC: 158 MG/DL (ref 70–99)
GLUCOSE BLD-MCNC: 159 MG/DL (ref 74–109)
HCT VFR BLD CALC: 26.8 % (ref 42–52)
HEMOGLOBIN: 8.5 G/DL (ref 14–18)
IMMATURE GRANULOCYTES #: 0.2 K/UL
LYMPHOCYTES ABSOLUTE: 1.3 K/UL (ref 1.1–4.5)
LYMPHOCYTES RELATIVE PERCENT: 11.7 % (ref 20–40)
MCH RBC QN AUTO: 28.5 PG (ref 27–31)
MCHC RBC AUTO-ENTMCNC: 31.7 G/DL (ref 33–37)
MCV RBC AUTO: 89.9 FL (ref 80–94)
MONOCYTES ABSOLUTE: 0.7 K/UL (ref 0–0.9)
MONOCYTES RELATIVE PERCENT: 6.1 % (ref 0–10)
MRSA CULTURE ONLY: NORMAL
NEUTROPHILS ABSOLUTE: 8.8 K/UL (ref 1.5–7.5)
NEUTROPHILS RELATIVE PERCENT: 79.8 % (ref 50–65)
PDW BLD-RTO: 16.6 % (ref 11.5–14.5)
PERFORMED ON: ABNORMAL
PLATELET # BLD: 322 K/UL (ref 130–400)
PMV BLD AUTO: 9.6 FL (ref 9.4–12.4)
POTASSIUM SERPL-SCNC: 3.1 MMOL/L (ref 3.5–5)
RBC # BLD: 2.98 M/UL (ref 4.7–6.1)
SODIUM BLD-SCNC: 142 MMOL/L (ref 136–145)
TOTAL PROTEIN: 6.4 G/DL (ref 6.6–8.7)
WBC # BLD: 11 K/UL (ref 4.8–10.8)

## 2020-03-25 PROCEDURE — 80053 COMPREHEN METABOLIC PANEL: CPT

## 2020-03-25 PROCEDURE — 2140000000 HC CCU INTERMEDIATE R&B

## 2020-03-25 PROCEDURE — 92507 TX SP LANG VOICE COMM INDIV: CPT

## 2020-03-25 PROCEDURE — 6370000000 HC RX 637 (ALT 250 FOR IP): Performed by: INTERNAL MEDICINE

## 2020-03-25 PROCEDURE — 2700000000 HC OXYGEN THERAPY PER DAY

## 2020-03-25 PROCEDURE — 2580000003 HC RX 258: Performed by: INTERNAL MEDICINE

## 2020-03-25 PROCEDURE — 92526 ORAL FUNCTION THERAPY: CPT

## 2020-03-25 PROCEDURE — 94640 AIRWAY INHALATION TREATMENT: CPT

## 2020-03-25 PROCEDURE — 99233 SBSQ HOSP IP/OBS HIGH 50: CPT | Performed by: INTERNAL MEDICINE

## 2020-03-25 PROCEDURE — 85025 COMPLETE CBC W/AUTO DIFF WBC: CPT

## 2020-03-25 PROCEDURE — 82947 ASSAY GLUCOSE BLOOD QUANT: CPT

## 2020-03-25 RX ORDER — ISOSORBIDE MONONITRATE 60 MG/1
60 TABLET, EXTENDED RELEASE ORAL DAILY
Status: DISCONTINUED | OUTPATIENT
Start: 2020-03-25 | End: 2020-03-27 | Stop reason: HOSPADM

## 2020-03-25 RX ADMIN — IPRATROPIUM BROMIDE AND ALBUTEROL SULFATE 1 AMPULE: .5; 3 SOLUTION RESPIRATORY (INHALATION) at 14:52

## 2020-03-25 RX ADMIN — SODIUM CHLORIDE, PRESERVATIVE FREE 10 ML: 5 INJECTION INTRAVENOUS at 23:10

## 2020-03-25 RX ADMIN — ATORVASTATIN CALCIUM 40 MG: 40 TABLET, FILM COATED ORAL at 23:08

## 2020-03-25 RX ADMIN — INSULIN LISPRO 1 UNITS: 100 INJECTION, SOLUTION INTRAVENOUS; SUBCUTANEOUS at 09:28

## 2020-03-25 RX ADMIN — METOPROLOL TARTRATE 12.5 MG: 25 TABLET, FILM COATED ORAL at 23:08

## 2020-03-25 RX ADMIN — ISOSORBIDE MONONITRATE 60 MG: 60 TABLET, EXTENDED RELEASE ORAL at 18:24

## 2020-03-25 RX ADMIN — ASPIRIN 81 MG 81 MG: 81 TABLET ORAL at 09:31

## 2020-03-25 RX ADMIN — IPRATROPIUM BROMIDE AND ALBUTEROL SULFATE 1 AMPULE: .5; 3 SOLUTION RESPIRATORY (INHALATION) at 06:21

## 2020-03-25 RX ADMIN — METOPROLOL TARTRATE 12.5 MG: 25 TABLET, FILM COATED ORAL at 09:32

## 2020-03-25 RX ADMIN — CLOPIDOGREL BISULFATE 75 MG: 75 TABLET ORAL at 09:32

## 2020-03-25 RX ADMIN — IPRATROPIUM BROMIDE AND ALBUTEROL SULFATE 1 AMPULE: .5; 3 SOLUTION RESPIRATORY (INHALATION) at 18:17

## 2020-03-25 RX ADMIN — LISINOPRIL 5 MG: 5 TABLET ORAL at 09:32

## 2020-03-25 RX ADMIN — IPRATROPIUM BROMIDE AND ALBUTEROL SULFATE 1 AMPULE: .5; 3 SOLUTION RESPIRATORY (INHALATION) at 10:10

## 2020-03-25 ASSESSMENT — PAIN SCALES - GENERAL
PAINLEVEL_OUTOF10: 0

## 2020-03-25 NOTE — PROGRESS NOTES
Progress Note    Date:3/25/2020       Room:0713/713-02  Patient Name:Alejandro Curran     YOB: 1950     Age:69 y.o. Subjective   Interval History Status: improved. Patient was seen this morning in his room, was trying to communicate verbally some words were comprehensible however somewhat hard to understand but patient was able to nod his head to yes or no with simple questions. No acute overnight event noted. Feeding tube has been changed and restarted pain nurse. Review of Systems   Review of Systems  Unable to fully assess. Medications   Scheduled Meds:    lisinopril  5 mg Oral Daily    clopidogrel  75 mg Oral Daily    ipratropium-albuterol  1 ampule Inhalation 4x daily    lidocaine 1 % injection  5 mL Intradermal Once    sodium chloride flush  10 mL Intravenous 2 times per day    metoprolol tartrate  12.5 mg Oral BID    insulin lispro  0-6 Units Subcutaneous TID WC    insulin lispro  0-3 Units Subcutaneous Nightly    aspirin  81 mg Oral Daily    atorvastatin  40 mg Oral Nightly     Continuous Infusions:    nitroGLYCERIN 10 mcg/min (03/24/20 1612)    dextrose       PRN Meds: heparin (porcine), heparin (porcine), nitroGLYCERIN, acetaminophen, potassium chloride, sodium chloride flush, morphine, glucose, dextrose, glucagon (rDNA), dextrose    Past History    Past Medical History:   has a past medical history of CAD (coronary artery disease), Chest pain, CHF (congestive heart failure) ([facility] [address] COPD (chronic obstructive pulmonary disease) ([facility] [address] Hyperlipidemia, Hypertension, Palliative care patient, and Pneumonia. Social History:   reports that he quit smoking about 21 months ago. His smoking use included cigarettes. He has a 50.00 pack-year smoking history. His smokeless tobacco use includes chew. He reports previous alcohol use. He reports that he does not use drugs.      Family History:   Family History   Problem Relation Age of Onset    Cancer Sister     Cancer Brother        Physical Examination      Vitals:  /76   Pulse 88   Temp 97.5 °F (36.4 °C) (Temporal)   Resp 18   Ht 6' (1.829 m)   Wt 215 lb 14.4 oz (97.9 kg)   SpO2 95%   BMI 29.28 kg/m²   Temp (24hrs), Av.8 °F (36.6 °C), Min:97.2 °F (36.2 °C), Max:98.2 °F (36.8 °C)      I/O (24Hr): Intake/Output Summary (Last 24 hours) at 3/25/2020 1257  Last data filed at 3/25/2020 0520  Gross per 24 hour   Intake 419 ml   Output 900 ml   Net -481 ml       Physical Exam  General: No acute distress lying comfortably in bed. HEENT: Atraumatic normocephalic, NG tube in place  Cardiac: Normal S1-S2, positive murmur. Respiratory: Adequate air entry with some bilateral lower lobe rhonchi no wheezing. Abdomen: nontender to palpation, no organomegaly noted. Rectal tube in place. Extremities: Right upper extremity noted to be reddish purplish in color with some edema compared to the left. PICC line noted in place. No tenderness in lower extremity, no edema, moves all extremities  Psych: Unable to fully assess      Labs/Imaging/Diagnostics   Labs:  CBC:  Recent Labs     20  0330 20  0620  0400   WBC 8.7 7.2 11.0*   RBC 2.96* 2.98* 2.98*   HGB 8.4* 8.5* 8.5*   HCT 26.9* 26.5* 26.8*   MCV 90.9 88.9 89.9   RDW 16.6* 16.4* 16.6*    251 322     CHEMISTRIES:  Recent Labs     20  0330  20  2256 20  0620  0400   *  --   --  139 142   K 3.4*   < > 3.1 3.6 3.1*   CL 97*  --   --  100 103   CO2 27  --   --  27 27   BUN 25*  --   --  28* 31*   CREATININE 0.8  --   --  0.6 0.7   GLUCOSE 166*  --   --  198* 159*   MG 2.1  --   --  2.2  --     < > = values in this interval not displayed. PT/INR:No results for input(s): PROTIME, INR in the last 72 hours.   APTT:  Recent Labs     20  2359 20  0620  1238   APTT 37.3* 107.4* 60.1*     LIVER PROFILE:  Recent Labs     20  0330 20  0600 20  0400   AST 17 58* 88*   ALT 17 25 51* bradycardia and metoprolol was decreased from 25 twice daily to 12.5 twice daily, patient developed epistaxis which resolved with packing. PICC line placed 3/17/2020. On 3/22/2020. She was noted to have fever chest x-ray was obtained and concern for possible hospital-acquired pneumonia patient was initiated on vancomycin and meropenem. Assessment  Acute on chronic hypoxemic respiratory failure: Improved  Patient extubated 3/23/2020  Severe stage III COPD with coexisting restrictive disease and severely reduced diffusion capacity  Comprehensive viral panel positive for RSV  Pneumonia secondary to RSV  NSTEMI in the setting of coronary artery disease  History of congestive heart failure  Tobacco use disorder  Dysphasia    Plan  Patient completed course of steroids  Initiated on antibiotics 3/22/2020 with Vanco meropenem for possible hospital-acquired pneumonia, antibiotics discontinued 3/24/2020. Pulmonary recommendations. PT OT and speech continued evaluation. Cardiology following; states no heart cath and medical management. Code: DNR  Diet: Tube feeds  Disposition: Likely to SNF awaiting cardiology's final recommendation.     Electronically signed by Chapis Penny MD on 3/25/20 at 12:57 PM CDT

## 2020-03-25 NOTE — PLAN OF CARE
Problem: Falls - Risk of:  Goal: Will remain free from falls  Description: Will remain free from falls  3/25/2020 0016 by Karyle Grumbles, RN  Outcome: Ongoing  3/24/2020 1913 by Estela Mckeon RN  Outcome: Ongoing  Goal: Absence of physical injury  Description: Absence of physical injury  3/25/2020 0016 by Karyle Grumbles, RN  Outcome: Ongoing  3/24/2020 1913 by Estela Mckeon RN  Outcome: Ongoing     Problem: Infection - Ventilator-Associated Pneumonia:  Goal: Prevent a ventilator associated event (VAE)  Description: Prevent a ventilator associated event (VAE)  3/25/2020 0016 by Karyle Grumbles, RN  Outcome: Ongoing  3/24/2020 1913 by Estela Mckeon RN  Outcome: Ongoing  Goal: Absence of pulmonary infection  Description: Absence of pulmonary infection  3/25/2020 0016 by Karyle Grumbles, RN  Outcome: Ongoing  3/24/2020 1913 by Estela Mckeon RN  Outcome: Ongoing     Problem: Urinary Elimination:  Goal: Signs and symptoms of infection will decrease  Description: Signs and symptoms of infection will decrease  3/25/2020 0016 by Karyle Grumbles, RN  Outcome: Ongoing  3/24/2020 1913 by Estela Mckeon RN  Outcome: Ongoing  Goal: Complications related to the disease process, condition or treatment will be avoided or minimized  Description: Complications related to the disease process, condition or treatment will be avoided or minimized  3/25/2020 0016 by Karyle Grumbles, RN  Outcome: Ongoing  3/24/2020 1913 by Estela Mckeon RN  Outcome: Ongoing     Problem: Breathing Pattern - Ineffective:  Goal: Ability to achieve and maintain a regular respiratory rate will improve  Description: Ability to achieve and maintain a regular respiratory rate will improve  3/25/2020 0016 by Karyle Grumbles, RN  Outcome: Ongoing  3/24/2020 1913 by Estela Mckeon RN  Outcome: Ongoing     Problem: Nutrition  Goal: Optimal nutrition therapy  Description: Nutrition Problem: Inadequate oral intake  Intervention: Food and/or Nutrient Delivery: Continue NPO, Start Tube Feeding  Nutritional Goals: meet nutritional needs via EN     3/25/2020 0016 by Beulah Nelson RN  Outcome: Ongoing  3/24/2020 1913 by Deyanira Gallegos RN  Outcome: Ongoing     Problem: Cardiac:  Goal: Hemodynamic stability will improve  Description: Hemodynamic stability will improve  3/25/2020 0016 by Beulah Nelson RN  Outcome: Ongoing  3/24/2020 1913 by Deyanira Gallegos RN  Outcome: Ongoing     Problem: Coping:  Goal: Level of anxiety will decrease  Description: Level of anxiety will decrease  3/25/2020 0016 by Beulah Nelson RN  Outcome: Ongoing  3/24/2020 1913 by Deyanira Gallegos RN  Outcome: Ongoing  Goal: Ability to cope will improve  Description: Ability to cope will improve  3/25/2020 0016 by Beulah Nelson RN  Outcome: Ongoing  3/24/2020 1913 by Deyanira Gallegos RN  Outcome: Ongoing  Goal: Ability to establish a method of communication will improve  Description: Ability to establish a method of communication will improve  3/25/2020 0016 by Beulah Nelson RN  Outcome: Ongoing  3/24/2020 1913 by Deyanira Gallegos RN  Outcome: Ongoing     Problem: Nutritional:  Goal: Consumption of the prescribed amount of daily calories will improve  Description: Consumption of the prescribed amount of daily calories will improve  3/25/2020 0016 by Beulah Nelson RN  Outcome: Ongoing  3/24/2020 1913 by Deyanira Gallegos RN  Outcome: Ongoing     Problem: Respiratory:  Goal: Complications related to the disease process, condition or treatment will be avoided or minimized  Description: Complications related to the disease process, condition or treatment will be avoided or minimized  3/25/2020 0016 by Beulah Nelson RN  Outcome: Ongoing  3/24/2020 1913 by Deyanira Gallegos RN  Outcome: Ongoing  Goal: Ability to maintain a clear airway will improve  Description: Ability to maintain a clear airway will improve  3/25/2020 0016 by Beulah Nelson RN  Outcome: Ongoing  3/24/2020 1913 by Deyanira Gallegos RN  Outcome: Ongoing  Goal: Ability to maintain adequate ventilation will improve  Description: Ability to maintain adequate ventilation will improve  3/25/2020 0016 by Beulah Nelson RN  Outcome: Ongoing  3/24/2020 1913 by Deyanira Gallegos RN  Outcome: Ongoing     Problem: Infection - Central Venous Catheter-Associated Bloodstream Infection:  Goal: Will show no infection signs and symptoms  Description: Will show no infection signs and symptoms  3/25/2020 0016 by Beulah Nelson RN  Outcome: Ongoing  3/24/2020 1913 by Deyanira Gallegos RN  Outcome: Ongoing     Problem: Pain:  Goal: Pain level will decrease  Description: Pain level will decrease  3/25/2020 0016 by Beulah Nelson RN  Outcome: Ongoing  3/24/2020 1913 by Deyanira Gallegos RN  Outcome: Ongoing  Goal: Control of acute pain  Description: Control of acute pain  3/25/2020 0016 by Beulah Nelson RN  Outcome: Ongoing  3/24/2020 1913 by Deyanira Gallegos RN  Outcome: Ongoing  Goal: Control of chronic pain  Description: Control of chronic pain  3/25/2020 0016 by Beulah Nelson RN  Outcome: Ongoing  3/24/2020 1913 by Deyanira Gallegos RN  Outcome: Ongoing     Problem: HH PREVENTION OF SKIN BREAKDOWN-PRESSURE ULCER  Goal: Understanding of ways to prevent future skin breakdown will improve  Description: Understanding of ways to prevent future skin breakdown will improve     3/25/2020 0016 by Beulah Nelson RN  Outcome: Ongoing  3/24/2020 1913 by Deyanira Gallegos RN  Outcome: Ongoing  3/24/2020 1502 by Lilia Agustin RN  Outcome: Ongoing

## 2020-03-25 NOTE — PROGRESS NOTES
Ice chips (Patient exhibited primarily vertical jaw movement at the front of the mouth during oral prep of ice chip trials presented by SLP.)  Suspected Premature Bolus Loss: Nectar - spoon (Patient exhibited inconsistently fast oral transit of nectar thick liquid trials presented via spoon by SLP.)  Oral Phase - Comment: Oral transit of ice chip trials primarily measured 1-2 seconds in length. Oral transit of puree consistency trials, presented by SLP, primarily measured 1-2 seconds in length and no oral cavity residue was noted post swallows. Oral transit of honey thick liquid trials, presented via spoon by SLP, primarily measured 1-2 seconds in length. Indicators of Pharyngeal Phase Dysfunction  Pharyngeal Phase: Exceptions  Suspected Swallow Delay: Nectar thick - spoon (Suspect secondary to oral transit times.)  Decreased Laryngeal Elevation: Patient exhibited sluggish, inconsistently mildly decreased laryngeal elevation for swallow airway protection. Delayed Cough: Nectar - spoon   Pharyngeal: No outward S/S penetration/aspiration was noted with any ice chip trial, puree consistency trial, or honey thick liquid trial administered during treatment session this date. Patient did exhibit S/S penetration/aspiration with nectar thick liquid trials with mild delayed coughs observed post swallows. At this time, do question decreased epiglottic inversion and decreased airway protection with present NG. Would recommend continuation NPO status. Continue short-term alternative means of nutrition. If patient receives mouth care prior to intake, okay for ice chips for comfort . If patient continues to exhibit improved pharyngeal phase of swallowing, do anticipate PO intake with removal of NG. Will continue to follow.     Electronically signed by MANNY Bailey on 3/25/2020 at 11:12 AM

## 2020-03-25 NOTE — PROGRESS NOTES
Nutrition Assessment (Enteral Nutrition)    Type and Reason for Visit: Reassess    Nutrition Recommendations: change formula to Vital 1.2 and increase goal rate to 68ml/hr. No free water flush at this time    Nutrition Assessment: Pt is off vent and moved to PCU. Due to increased needs, tf formula was modified to Vital 1.2 and goal rate is now 68ml/hr. TF is advanced to 25ml and still appears to be toleraing feeding. Malnutrition Assessment:  · Malnutrition Status: No malnutrition  · Context: Acute illness or injury  · Findings of the 6 clinical characteristics of malnutrition (Minimum of 2 out of 6 clinical characteristics is required to make the diagnosis of moderate or severe Protein Calorie Malnutrition based on AND/ASPEN Guidelines):  1. Energy Intake-Less than or equal to 75% of estimated energy requirement, Greater than or equal to 7 days    2. Weight Loss-2% loss or greater, in 1 week  3. Fat Loss-No significant subcutaneous fat loss,    4. Muscle Loss-No significant muscle mass loss,    5. Fluid Accumulation-Moderate to severe fluid accumulation, Extremities  6.   Strength-Not measured    Nutrition Risk Level: High    Nutrition Needs:  · Estimated Daily Total Kcal: 3588-2733 kcals (20-25kcals/kg)  · Estimated Daily Protein (g): 105-161g  · Estimated Daily Fluid (ml/day): 1958-2425ml    Nutrition Diagnosis:   · Problem: Inadequate oral intake  · Etiology: related to Difficulty swallowing     Signs and symptoms:  as evidenced by NPO status due to medical condition, Nutrition support - EN, Swallow study results    Objective Information:  · Nutrition-Focused Physical Findings: on vent  · Wound Type: None  · Current Nutrition Therapies:  · Oral Diet Orders: NPO   · Oral Diet intake: NPO  · Oral Nutrition Supplement (ONS) Orders: None  · ONS intake: NPO  · Tube Feeding (TF) Orders:   · Feeding Route: Nasogastric  · Formula: Semi-elemental  · Rate (ml/hr):25ml     · Volume (ml/day): 600ml  · Duration: Continuous  · Additives/Modulars: Protein  · Water Flushes: 40ml hourly via tf pump  · Current TF & Flush Orders Provides: 25ml = 720 kcals [id number] protein, 66g CHO and 486 ml free water from formula. Proteinex will give an additional 78g protein. · Goal TF & Flush Orders Provides: 68ml = 1958 kcals with 122g protein, 180g CHO and 1323ml free waer from tf formula.   No free water via pump  · Additional Calories: 0     · Anthropometric Measures:  · Ht: 6' (182.9 cm)   · Current Body Wt: 215 lb 14 oz (97.9 kg)  · Admission Body Wt: 222 lb (100.7 kg)  · Usual Body Wt: (215-220#)  · Weight Change: 3.1% weight decrease in 1 week   · Ideal Body Wt: 178 lb (80.7 kg), % Ideal Body 120.8%  · BMI Classification: BMI 25.0 - 29.9 Overweight    Nutrition Interventions:   Continue NPO, Modify current ONS, Continue current ONS  Continued Inpatient Monitoring    Nutrition Evaluation:   · Evaluation: Progressing toward goals   · Goals: meet nutritional needs via EN   · Monitoring: TF Intake, TF Tolerance, Skin Integrity, Weight, Pertinent Labs, I&O, Chewing/Swallowing      Electronically signed by Xena Steward MS, RD, LD on 3/25/20 at 10:48 AM CDT    Contact Number: [phone]

## 2020-03-25 NOTE — PLAN OF CARE
Problem: Falls - Risk of:  Goal: Will remain free from falls  Description: Will remain free from falls  Outcome: Ongoing  Goal: Absence of physical injury  Description: Absence of physical injury  Outcome: Ongoing     Problem: Infection - Ventilator-Associated Pneumonia:  Goal: Prevent a ventilator associated event (VAE)  Description: Prevent a ventilator associated event (VAE)  Outcome: Ongoing  Goal: Absence of pulmonary infection  Description: Absence of pulmonary infection  Outcome: Ongoing     Problem: Urinary Elimination:  Goal: Signs and symptoms of infection will decrease  Description: Signs and symptoms of infection will decrease  Outcome: Ongoing  Goal: Complications related to the disease process, condition or treatment will be avoided or minimized  Description: Complications related to the disease process, condition or treatment will be avoided or minimized  Outcome: Ongoing     Problem: Breathing Pattern - Ineffective:  Goal: Ability to achieve and maintain a regular respiratory rate will improve  Description: Ability to achieve and maintain a regular respiratory rate will improve  Outcome: Ongoing     Problem: Nutrition  Goal: Optimal nutrition therapy  Description: Nutrition Problem: Inadequate oral intake  Intervention: Food and/or Nutrient Delivery: Continue NPO, Start Tube Feeding  Nutritional Goals: meet nutritional needs via EN     Outcome: Ongoing     Problem: Cardiac:  Goal: Hemodynamic stability will improve  Description: Hemodynamic stability will improve  Outcome: Ongoing     Problem: Coping:  Goal: Level of anxiety will decrease  Description: Level of anxiety will decrease  Outcome: Ongoing  Goal: Ability to cope will improve  Description: Ability to cope will improve  Outcome: Ongoing  Goal: Ability to establish a method of communication will improve  Description: Ability to establish a method of communication will improve  Outcome: Ongoing     Problem: Nutritional:  Goal: Consumption of the prescribed amount of daily calories will improve  Description: Consumption of the prescribed amount of daily calories will improve  Outcome: Ongoing     Problem: Respiratory:  Goal: Complications related to the disease process, condition or treatment will be avoided or minimized  Description: Complications related to the disease process, condition or treatment will be avoided or minimized  Outcome: Ongoing  Goal: Ability to maintain a clear airway will improve  Description: Ability to maintain a clear airway will improve  Outcome: Ongoing  Goal: Ability to maintain adequate ventilation will improve  Description: Ability to maintain adequate ventilation will improve  Outcome: Ongoing     Problem: Infection - Central Venous Catheter-Associated Bloodstream Infection:  Goal: Will show no infection signs and symptoms  Description: Will show no infection signs and symptoms  Outcome: Ongoing     Problem: Pain:  Goal: Pain level will decrease  Description: Pain level will decrease  Outcome: Ongoing  Goal: Control of acute pain  Description: Control of acute pain  Outcome: Ongoing  Goal: Control of chronic pain  Description: Control of chronic pain  Outcome: Ongoing     Problem: HH PREVENTION OF SKIN BREAKDOWN-PRESSURE ULCER  Goal: Understanding of ways to prevent future skin breakdown will improve  Description: Understanding of ways to prevent future skin breakdown will improve     3/24/2020 1913 by Yadira Narvaez RN  Outcome: Ongoing  3/24/2020 1502 by Katy Rucker RN  Outcome: Ongoing

## 2020-03-25 NOTE — CARE COORDINATION
Spoke with pt who is agreeable to SNF rehab and SW provided the choice letter for pt to review and discuss options with his spouse and family.

## 2020-03-25 NOTE — PROGRESS NOTES
Av , Min:101 , LAE:197    Diastolic (89JJF), QNP:31, Min:60, Max:90        Nilsa Dubose is a 81 y.o. male with the following history as recorded in [facility]:    Patient Active Problem List    Diagnosis Date Noted    Palliative care patient 2020     Priority: Low    [facility]STEMI (non-ST elevated myocardial infarction) ([facility] 2020     Priority: Low    COPD exacerbation ([facility] 2019     Priority: Low    Hypertension 2019     Priority: Low    Syncope and collapse 2019     Priority: Low    Chest pain 2018     Priority: Low    STEMI (ST elevation myocardial infarction) ([facility] 2018     Priority: Low    CAD (coronary artery disease) 2018    Acute ST elevation myocardial infarction (STEMI) involving left anterior descending (LAD) coronary artery (HCC)     Electronic cigarette use      Current Facility-Administered Medications   Medication Dose Route Frequency Provider Last Rate Last Dose    lisinopril (PRINIVIL;ZESTRIL) tablet 5 mg  5 mg Oral Daily Irven Bloodgood, DO   5 mg at 20 0932    clopidogrel (PLAVIX) tablet 75 mg  75 mg Oral Daily Irven Bloodgood, DO   75 mg at 20 0932    nitroGLYCERIN 50 mg in dextrose 5% 250 mL infusion  10 mcg/min Intravenous Continuous Karen Alexandra MD 3 mL/hr at 20 1612 10 mcg/min at 20 1612    ipratropium-albuterol (DUONEB) nebulizer solution 1 ampule  1 ampule Inhalation 4x daily Richie Castaneda MD   1 ampule at 20 1452    heparin (porcine) injection 4,000 Units  4,000 Units Intravenous PRN Irven Bloodgood, DO        heparin (porcine) injection 2,000 Units  2,000 Units Intravenous PRN Irven Bloodgood, DO        nitroGLYCERIN (NITROSTAT) SL tablet 0.4 mg  0.4 mg Sublingual Q5 Min PRN Irven Bloodgood, DO        acetaminophen (TYLENOL) 160 MG/5ML solution 650 mg  650 mg Oral Q4H PRN Irven Bloodgood, DO   650 mg at 20 0504    potassium chloride 20 mEq/50 mL IVPB (Central Line)  20 mEq Intravenous PRN Dorothy Severino, DO 50 mL/hr at 03/23/20 5859 20 mEq at 03/23/20 5145    lidocaine PF 1 % injection 5 mL  5 mL Intradermal Once Dorothy Severino DO        sodium chloride flush 0.9 % injection 10 mL  10 mL Intravenous 2 times per day Dorothy Severino, DO   10 mL at 03/24/20 2107    sodium chloride flush 0.9 % injection 10 mL  10 mL Intravenous PRN Dorothy Severino, DO        metoprolol tartrate (LOPRESSOR) tablet 12.5 mg  12.5 mg Oral BID Dorothy Severino, DO   12.5 mg at 03/25/20 0932    morphine injection 2 mg  2 mg Intravenous Q1H PRN Dorothy Severino, DO   2 mg at 03/23/20 1320    insulin lispro (HUMALOG) injection vial 0-6 Units  0-6 Units Subcutaneous TID WC Dorothy Severino, DO   1 Units at 03/25/20 6544    insulin lispro (HUMALOG) injection vial 0-3 Units  0-3 Units Subcutaneous Nightly Dorothy Severino, DO   1 Units at 03/24/20 2055    aspirin chewable tablet 81 mg  81 mg Oral Daily Dorothy Severino, DO   81 mg at 03/25/20 5101    atorvastatin (LIPITOR) tablet 40 mg  40 mg Oral Nightly Dorothy Severino, DO   40 mg at 03/24/20 2056    glucose (GLUTOSE) 40 % oral gel 15 g  15 g Oral PRN Dorothy Severino, DO        dextrose 50 % IV solution  12.5 g Intravenous PRN Dorothy Severino, DO        glucagon (rDNA) injection 1 mg  1 mg Intramuscular PRN Dorothy Severino, DO        dextrose 5 % solution  100 mL/hr Intravenous PRN Dorothy Severino, DO         Allergies: Patient has no known allergies.   Past Medical History:   Diagnosis Date    CAD (coronary artery disease)     Chest pain 11/14/2018    CHF (congestive heart failure) (HCC)     COPD (chronic obstructive pulmonary disease) (HCC)     Hyperlipidemia     Hypertension     Palliative care patient 03/12/2020    Pneumonia      Past Surgical History:   Procedure Laterality Date    APPENDECTOMY      BACK SURGERY      COLONOSCOPY      ENDOSCOPY, COLON,

## 2020-03-26 LAB
ALBUMIN SERPL-MCNC: 2.7 G/DL (ref 3.5–5.2)
ALP BLD-CCNC: 61 U/L (ref 40–130)
ALT SERPL-CCNC: 76 U/L (ref 5–41)
ANION GAP SERPL CALCULATED.3IONS-SCNC: 13 MMOL/L (ref 7–19)
AST SERPL-CCNC: 81 U/L (ref 5–40)
BASOPHILS ABSOLUTE: 0 K/UL (ref 0–0.2)
BASOPHILS RELATIVE PERCENT: 0.3 % (ref 0–1)
BILIRUB SERPL-MCNC: 0.6 MG/DL (ref 0.2–1.2)
BUN BLDV-MCNC: 32 MG/DL (ref 8–23)
C DIFF TOXIN/ANTIGEN: NORMAL
CALCIUM SERPL-MCNC: 8.4 MG/DL (ref 8.8–10.2)
CHLORIDE BLD-SCNC: 105 MMOL/L (ref 98–111)
CO2: 28 MMOL/L (ref 22–29)
CREAT SERPL-MCNC: 0.6 MG/DL (ref 0.5–1.2)
EOSINOPHILS ABSOLUTE: 0.1 K/UL (ref 0–0.6)
EOSINOPHILS RELATIVE PERCENT: 0.8 % (ref 0–5)
GFR NON-AFRICAN AMERICAN: >60
GLUCOSE BLD-MCNC: 108 MG/DL (ref 70–99)
GLUCOSE BLD-MCNC: 109 MG/DL (ref 74–109)
GLUCOSE BLD-MCNC: 121 MG/DL (ref 70–99)
GLUCOSE BLD-MCNC: 121 MG/DL (ref 70–99)
GLUCOSE BLD-MCNC: 149 MG/DL (ref 70–99)
HCT VFR BLD CALC: 28.6 % (ref 42–52)
HEMOGLOBIN: 9.1 G/DL (ref 14–18)
IMMATURE GRANULOCYTES #: 0.3 K/UL
LYMPHOCYTES ABSOLUTE: 2.4 K/UL (ref 1.1–4.5)
LYMPHOCYTES RELATIVE PERCENT: 26.4 % (ref 20–40)
MAGNESIUM: 2.3 MG/DL (ref 1.6–2.4)
MCH RBC QN AUTO: 28.8 PG (ref 27–31)
MCHC RBC AUTO-ENTMCNC: 31.8 G/DL (ref 33–37)
MCV RBC AUTO: 90.5 FL (ref 80–94)
MONOCYTES ABSOLUTE: 0.8 K/UL (ref 0–0.9)
MONOCYTES RELATIVE PERCENT: 8.6 % (ref 0–10)
NEUTROPHILS ABSOLUTE: 5.6 K/UL (ref 1.5–7.5)
NEUTROPHILS RELATIVE PERCENT: 61 % (ref 50–65)
PDW BLD-RTO: 17.3 % (ref 11.5–14.5)
PERFORMED ON: ABNORMAL
PLATELET # BLD: 356 K/UL (ref 130–400)
PMV BLD AUTO: 9.1 FL (ref 9.4–12.4)
POTASSIUM SERPL-SCNC: 2.6 MMOL/L (ref 3.5–5)
RBC # BLD: 3.16 M/UL (ref 4.7–6.1)
SODIUM BLD-SCNC: 146 MMOL/L (ref 136–145)
TOTAL PROTEIN: 6.1 G/DL (ref 6.6–8.7)
WBC # BLD: 9.2 K/UL (ref 4.8–10.8)

## 2020-03-26 PROCEDURE — 6370000000 HC RX 637 (ALT 250 FOR IP): Performed by: INTERNAL MEDICINE

## 2020-03-26 PROCEDURE — 87324 CLOSTRIDIUM AG IA: CPT

## 2020-03-26 PROCEDURE — 99232 SBSQ HOSP IP/OBS MODERATE 35: CPT | Performed by: NURSE PRACTITIONER

## 2020-03-26 PROCEDURE — 80053 COMPREHEN METABOLIC PANEL: CPT

## 2020-03-26 PROCEDURE — 2140000000 HC CCU INTERMEDIATE R&B

## 2020-03-26 PROCEDURE — 6370000000 HC RX 637 (ALT 250 FOR IP): Performed by: HOSPITALIST

## 2020-03-26 PROCEDURE — 92526 ORAL FUNCTION THERAPY: CPT

## 2020-03-26 PROCEDURE — 82947 ASSAY GLUCOSE BLOOD QUANT: CPT

## 2020-03-26 PROCEDURE — 2700000000 HC OXYGEN THERAPY PER DAY

## 2020-03-26 PROCEDURE — 87449 NOS EACH ORGANISM AG IA: CPT

## 2020-03-26 PROCEDURE — 83735 ASSAY OF MAGNESIUM: CPT

## 2020-03-26 PROCEDURE — 6360000002 HC RX W HCPCS: Performed by: INTERNAL MEDICINE

## 2020-03-26 PROCEDURE — 85025 COMPLETE CBC W/AUTO DIFF WBC: CPT

## 2020-03-26 PROCEDURE — 94640 AIRWAY INHALATION TREATMENT: CPT

## 2020-03-26 PROCEDURE — 99232 SBSQ HOSP IP/OBS MODERATE 35: CPT | Performed by: INTERNAL MEDICINE

## 2020-03-26 PROCEDURE — 51798 US URINE CAPACITY MEASURE: CPT

## 2020-03-26 PROCEDURE — 2580000003 HC RX 258: Performed by: INTERNAL MEDICINE

## 2020-03-26 RX ORDER — TAMSULOSIN HYDROCHLORIDE 0.4 MG/1
0.4 CAPSULE ORAL DAILY
Status: DISCONTINUED | OUTPATIENT
Start: 2020-03-26 | End: 2020-03-27 | Stop reason: HOSPADM

## 2020-03-26 RX ADMIN — SODIUM CHLORIDE, PRESERVATIVE FREE 10 ML: 5 INJECTION INTRAVENOUS at 09:13

## 2020-03-26 RX ADMIN — POTASSIUM CHLORIDE 20 MEQ: 29.8 INJECTION, SOLUTION INTRAVENOUS at 07:57

## 2020-03-26 RX ADMIN — IPRATROPIUM BROMIDE AND ALBUTEROL SULFATE 1 AMPULE: .5; 3 SOLUTION RESPIRATORY (INHALATION) at 10:35

## 2020-03-26 RX ADMIN — ATORVASTATIN CALCIUM 40 MG: 40 TABLET, FILM COATED ORAL at 21:11

## 2020-03-26 RX ADMIN — ISOSORBIDE MONONITRATE 60 MG: 60 TABLET, EXTENDED RELEASE ORAL at 09:12

## 2020-03-26 RX ADMIN — ASPIRIN 81 MG 81 MG: 81 TABLET ORAL at 09:12

## 2020-03-26 RX ADMIN — METOPROLOL TARTRATE 12.5 MG: 25 TABLET, FILM COATED ORAL at 09:13

## 2020-03-26 RX ADMIN — POTASSIUM CHLORIDE 20 MEQ: 29.8 INJECTION, SOLUTION INTRAVENOUS at 06:41

## 2020-03-26 RX ADMIN — POTASSIUM CHLORIDE 20 MEQ: 29.8 INJECTION, SOLUTION INTRAVENOUS at 08:50

## 2020-03-26 RX ADMIN — SODIUM CHLORIDE, PRESERVATIVE FREE 10 ML: 5 INJECTION INTRAVENOUS at 21:11

## 2020-03-26 RX ADMIN — IPRATROPIUM BROMIDE AND ALBUTEROL SULFATE 1 AMPULE: .5; 3 SOLUTION RESPIRATORY (INHALATION) at 14:10

## 2020-03-26 RX ADMIN — TAMSULOSIN HYDROCHLORIDE 0.4 MG: 0.4 CAPSULE ORAL at 09:12

## 2020-03-26 RX ADMIN — IPRATROPIUM BROMIDE AND ALBUTEROL SULFATE 1 AMPULE: .5; 3 SOLUTION RESPIRATORY (INHALATION) at 18:06

## 2020-03-26 RX ADMIN — IPRATROPIUM BROMIDE AND ALBUTEROL SULFATE 1 AMPULE: .5; 3 SOLUTION RESPIRATORY (INHALATION) at 06:16

## 2020-03-26 RX ADMIN — METOPROLOL TARTRATE 12.5 MG: 25 TABLET, FILM COATED ORAL at 21:12

## 2020-03-26 RX ADMIN — CLOPIDOGREL BISULFATE 75 MG: 75 TABLET ORAL at 09:13

## 2020-03-26 RX ADMIN — LISINOPRIL 5 MG: 5 TABLET ORAL at 09:12

## 2020-03-26 ASSESSMENT — PAIN SCALES - WONG BAKER
WONGBAKER_NUMERICALRESPONSE: 0

## 2020-03-26 ASSESSMENT — PAIN SCALES - GENERAL
PAINLEVEL_OUTOF10: 0
PAINLEVEL_OUTOF10: 0

## 2020-03-26 NOTE — PROGRESS NOTES
Patient had not produced any urine since beginning of shift, Patient had an external catheter to start with, Approximately 6 hours into shift still no urine. Bladder scan was done resulting in the scan showing >835ml in the bladder, Patient was encouraged to void with a result of the patient producing 10ml. Hospitalist notified, Flomax 0.4 mg/ Daily and a indwelling ray was ordered, Indwelling ray emptied approx. 1000ml from Patients Bladder.  Will continue to monitor Electronically signed by Yasir Escobar RN on 3/26/2020 at 3:40 AM

## 2020-03-26 NOTE — PROGRESS NOTES
cannula  Liters of Oxygen: 2 L  Communication Observation: Dysarthria;Aphasia  Follows Directions: Simple  Dentition: Edentulous(Reports U/L dentures are at home)  Patient Positioning: Upright in bed  Baseline Vocal Quality: Strong  Volitional Cough: Strong(Strong throat clear), congested cough at rest  Volitional Swallow: Delayed  Consistencies Administered: Dysphagia Pureed (Dysphagia I); Thin - cup; Thin - straw; nectar thick, honey thick, pudding  WBC: 9.2  Intubation: 3/11/20  Extubation: 3/23/20  NG placement: 3/13/20      Pain:  Pain Assessment  Pain Assessment: FLACC  Pain Level: 0  Patient's Stated Pain Goal: No pain  Pain Type: Acute pain  Pain Location: Chest  Pain Orientation: Mid  Pain Descriptors: Pressure  Non-Pharmaceutical Pain Intervention(s): Relaxation techniques, Repositioned  Response to Pain Intervention: Patient Satisfied  RASS Score: Alert and calm  Pain Assessment/FLACC  Pain Rating: FLACC (rest) - Face: no particular expression or smile  Pain Rating: FLACC (rest) - Legs: normal position or relaxed  Pain Rating: FLACC (rest) - Activity: lying quietly, normal position, moves easily  Pain Rating: FLACC (rest) - Cry: no cry (awake or asleep)  Pain Rating: FLACC (rest) - Consolability: content, relaxed  Score: FLACC (rest): 0        P. O. Trials:  Weakened hyolaryngeal elevation, however this was functional. Very delayed congested wet cough following thin liquids via tsp and cup. Several trials of nectar thick liquids via cup were presented and a delayed congested wet cough followed each sip. Honey thick liquids via cup were then presented and resulted in delayed congested cough. Several presentations of pureed foods were trialed. The first presentation of puree resulted in a delayed congested wet cough. All subsequent presentations were tolerated well. Pudding via tsp was tolerated without overt s/s of aspiration. Impressions:   The patient did exhibit overt s/s of aspiration with all

## 2020-03-26 NOTE — PROGRESS NOTES
Progress Note    Date:3/26/2020       Room:0713/713-02  Patient Name:Alejandro Jarrett     YOB: 1950     Age:70 y.o. Subjective   Interval History Status: improved. Patient was seen this morning in his room, patient is Aubree Buck was able to answer questions appropriately. Pulled out his NG tube yesterday and after discussion with patient this morning stated he does not want it placed back. Reevaluated by speech today, still at risk for aspiration however recommended dysphagia 1 diet. C. difficile was sent which resulted negative. Currently denied any issues at this time. Review of Systems   Review of Systems  12 point system reviewed and negative except as stated above. Medications   Scheduled Meds:    tamsulosin  0.4 mg Oral Daily    isosorbide mononitrate  60 mg Oral Daily    lisinopril  5 mg Oral Daily    clopidogrel  75 mg Oral Daily    ipratropium-albuterol  1 ampule Inhalation 4x daily    lidocaine 1 % injection  5 mL Intradermal Once    sodium chloride flush  10 mL Intravenous 2 times per day    metoprolol tartrate  12.5 mg Oral BID    insulin lispro  0-6 Units Subcutaneous TID WC    insulin lispro  0-3 Units Subcutaneous Nightly    aspirin  81 mg Oral Daily    atorvastatin  40 mg Oral Nightly     Continuous Infusions:    dextrose       PRN Meds: nitroGLYCERIN, acetaminophen, potassium chloride, sodium chloride flush, morphine, glucose, dextrose, glucagon (rDNA), dextrose    Past History    Past Medical History:   has a past medical history of CAD (coronary artery disease), Chest pain, CHF (congestive heart failure) ([facility] COPD (chronic obstructive pulmonary disease) ([facility] Hyperlipidemia, Hypertension, Palliative care patient, and Pneumonia. Social History:   reports that he quit smoking about 21 months ago. His smoking use included cigarettes. He has a 50.00 pack-year smoking history. His smokeless tobacco use includes chew.  He reports previous

## 2020-03-26 NOTE — PLAN OF CARE
the prescribed amount of daily calories will improve  Description: Consumption of the prescribed amount of daily calories will improve  Outcome: Ongoing     Problem: Respiratory:  Goal: Complications related to the disease process, condition or treatment will be avoided or minimized  Description: Complications related to the disease process, condition or treatment will be avoided or minimized  Outcome: Ongoing  Goal: Ability to maintain a clear airway will improve  Description: Ability to maintain a clear airway will improve  Outcome: Ongoing  Goal: Ability to maintain adequate ventilation will improve  Description: Ability to maintain adequate ventilation will improve  Outcome: Ongoing     Problem: Infection - Central Venous Catheter-Associated Bloodstream Infection:  Goal: Will show no infection signs and symptoms  Description: Will show no infection signs and symptoms  Outcome: Ongoing     Problem: Pain:  Goal: Pain level will decrease  Description: Pain level will decrease  Outcome: Ongoing  Goal: Control of acute pain  Description: Control of acute pain  Outcome: Ongoing  Goal: Control of chronic pain  Description: Control of chronic pain  Outcome: Ongoing     Problem: HH PREVENTION OF SKIN BREAKDOWN-PRESSURE ULCER  Goal: Understanding of ways to prevent future skin breakdown will improve  Description: Understanding of ways to prevent future skin breakdown will improve     Outcome: Ongoing     Problem: Confusion - Acute:  Goal: Absence of continued neurological deterioration signs and symptoms  Description: Absence of continued neurological deterioration signs and symptoms  Outcome: Ongoing  Goal: Mental status will be restored to baseline  Description: Mental status will be restored to baseline  Outcome: Ongoing     Problem: Discharge Planning:  Goal: Ability to perform activities of daily living will improve  Description: Ability to perform activities of daily living will improve  Outcome: Ongoing  Goal: Participates in care planning  Description: Participates in care planning  Outcome: Ongoing     Problem: Injury - Risk of, Physical Injury:  Goal: Will remain free from falls  Description: Will remain free from falls  Outcome: Ongoing  Goal: Absence of physical injury  Description: Absence of physical injury  Outcome: Ongoing     Problem: Mood - Altered:  Goal: Mood stable  Description: Mood stable  Outcome: Ongoing  Goal: Absence of abusive behavior  Description: Absence of abusive behavior  Outcome: Ongoing  Goal: Verbalizations of feeling emotionally comfortable while being cared for will increase  Description: Verbalizations of feeling emotionally comfortable while being cared for will increase  Outcome: Ongoing     Problem: Psychomotor Activity - Altered:  Goal: Absence of psychomotor disturbance signs and symptoms  Description: Absence of psychomotor disturbance signs and symptoms  Outcome: Ongoing     Problem: Sensory Perception - Impaired:  Goal: Demonstrations of improved sensory functioning will increase  Description: Demonstrations of improved sensory functioning will increase  Outcome: Ongoing  Goal: Decrease in sensory misperception frequency  Description: Decrease in sensory misperception frequency  Outcome: Ongoing  Goal: Able to refrain from responding to false sensory perceptions  Description: Able to refrain from responding to false sensory perceptions  Outcome: Ongoing  Goal: Demonstrates accurate environmental perceptions  Description: Demonstrates accurate environmental perceptions  Outcome: Ongoing  Goal: Able to distinguish between reality-based and nonreality-based thinking  Description: Able to distinguish between reality-based and nonreality-based thinking  Outcome: Ongoing  Goal: Able to interrupt nonreality-based thinking  Description: Able to interrupt nonreality-based thinking  Outcome: Ongoing     Problem: Sleep Pattern Disturbance:  Goal: Appears well-rested  Description: Appears

## 2020-03-26 NOTE — PROGRESS NOTES
Palliative Care Progress Note  3/26/2020 12:34 PM  Subjective:   Admit Date: 3/11/2020  PCP: Jigar Cole MD    Chief Complaint: Weakness    Interval History: Patient extubated on 3/23/2020 and is currently on nasal cannula oxygen. He remains very weak and continues to work with ST on swallowing function. Voice quality is weak but understandable. SW assisting with possible SNF for continued rehab. Review of Systems   Unable to obtain, intubated and mechanically ventilated    DIET TUBE FEED CONTINUOUS/CYCLIC NPO; Semi-elemental (vital 1.2);  Nasogastric; Continuous; 10; 68    Medications:   dextrose       Current Facility-Administered Medications   Medication Dose Route Frequency Provider Last Rate Last Dose    tamsulosin (FLOMAX) capsule 0.4 mg  0.4 mg Oral Daily Seamus Mari MD   0.4 mg at 03/26/20 0912    isosorbide mononitrate (IMDUR) extended release tablet 60 mg  60 mg Oral Daily Danna Serrano MD   60 mg at 03/26/20 0912    lisinopril (PRINIVIL;ZESTRIL) tablet 5 mg  5 mg Oral Daily Cierra Brill, DO   5 mg at 03/26/20 2273    clopidogrel (PLAVIX) tablet 75 mg  75 mg Oral Daily Cierra Brill, DO   75 mg at 03/26/20 0913    ipratropium-albuterol (DUONEB) nebulizer solution 1 ampule  1 ampule Inhalation 4x daily Alexander Bowman MD   1 ampule at 03/26/20 1035    nitroGLYCERIN (NITROSTAT) SL tablet 0.4 mg  0.4 mg Sublingual Q5 Min PRN Cierra Brill, DO        acetaminophen (TYLENOL) 160 MG/5ML solution 650 mg  650 mg Oral Q4H PRN Cierra Brill, DO   650 mg at 03/22/20 0504    potassium chloride 20 mEq/50 mL IVPB (Central Line)  20 mEq Intravenous PRN Cierra Brill, DO 50 mL/hr at 03/26/20 0850 20 mEq at 03/26/20 0850    lidocaine PF 1 % injection 5 mL  5 mL Intradermal Once Cierra Brill, DO        sodium chloride flush 0.9 % injection 10 mL  10 mL Intravenous 2 times per day Cierra Brill, DO   10 mL at 03/26/20 0913    sodium chloride flush 0.9 % the bedside with no family present. He is alert and participated in the visit. I reviewed Palliative Services with him and discussed how we have been following him throughout hospitalization. Patient is weak and has weak voice quality but I am able to understand him. He expressed fear/concern about his inability to remember his CCU stay and I provided emotional support and encouragement. We discussed his weak swallow function and contributing factors, encouraged continued participation with therapies and plan for SNF to continue rehab. Patient verbalizes understanding and agrees that he needs rehab. At this point, we will follow peripherally and assist as needed, even for support as patient visitation is limited at this time. Palliative will continue to follow. Recommendations:  SW assisting with referral to SNF for continued rehab    Thank you for consulting Palliative Care and allowing us to participate in the care of this patient.        Electronically signed by CHALO Busch on 3/26/2020 at 12:34 PM    (Please note that portions of this note were completed with a voice recognition program.  Lei Economy made to edit the dictations but occasionally words are mis-transcribed.)

## 2020-03-26 NOTE — PROGRESS NOTES
[address]    Daily HOSPITAL Progress Note                            Date:  3/26/20  Patient: Didi Mtz  Admission:  3/11/2020  5:38 PM  Admit DX: NSTEMI (non-ST elevated myocardial infarction) [facility]) [I21.4]  Age:  78 y.o., 1950        Date of Admission 3/11/2020  5:38 PM   Hospital Length of Stay  LOS: 15 days            I personally saw the patient and rounded with:  Isacc Chavez RN Nurse [id number] [facility]   RN on 3/26/20    The observations documented in this note, including the assessment   and plan are mine    Portions of this note have been copied forward, from prior notes, yet modified, to reflect today's clinical status of this patient. Reason for initial evaluation or the patient's initial complaint    1. Reason for Hospital Admission: Dyspnea - acute respiratory failure        2. Reason for Cardiology Consultation: + troponin         SUBJECTIVE:      Chief Complaint / Reason for the Visit   Follow up of:  dyspnea and elevated troponin and systemic arterial hypertension    Family present and in room during examination:  No    At the time of the examination, the patient was:  Lying in bed      Specialty Problems        Cardiology Problems    STEMI (ST elevation myocardial infarction) ([facility]        Chest pain        Syncope and collapse        Hypertension        NSTEMI (non-ST elevated myocardial infarction) ([facility]        Acute ST elevation myocardial infarction (STEMI) involving left anterior descending (LAD) coronary artery (HCC)        CAD (coronary artery disease)              Current Status Today According to the patient:  \"ok\"    Subjective:  Mr. Didi Mtz is generally feeling unchanged. Extubated without chest pain    Mr. Didi Mtz has the following cardiac complaints / symptoms today:    1. dyspnea, extubated    2. + troponin, on meds, no chest pain    3.  Hypertension    The blood pressure for the lastr 36 hours has been:  Systolic (36hrs), Av , Min:102 , BMM:112    Diastolic (76PVU), ECF:98, Min:61, Max:87        Eric Redman is a 78 y.o. male with the following history as recorded in [facility]:    Patient Active Problem List    Diagnosis Date Noted    Palliative care patient 2020     Priority: Low    [facility]STEMI (non-ST elevated myocardial infarction) [facility] 2020     Priority: Low    COPD exacerbation ([facility] 2019     Priority: Low    Hypertension 2019     Priority: Low    Syncope and collapse 2019     Priority: Low    Chest pain 2018     Priority: Low    STEMI (ST elevation myocardial infarction) ([facility] 2018     Priority: Low    CAD (coronary artery disease) 2018    Acute ST elevation myocardial infarction (STEMI) involving left anterior descending (LAD) coronary artery (HCC)     Electronic cigarette use      Current Facility-Administered Medications   Medication Dose Route Frequency Provider Last Rate Last Dose    tamsulosin (FLOMAX) capsule 0.4 mg  0.4 mg Oral Daily Bere Wilson MD   0.4 mg at 20 0912    isosorbide mononitrate (IMDUR) extended release tablet 60 mg  60 mg Oral Daily Derrell Doe MD   60 mg at 20 0912    lisinopril (PRINIVIL;ZESTRIL) tablet 5 mg  5 mg Oral Daily Barber Pacer, DO   5 mg at 20 0912    clopidogrel (PLAVIX) tablet 75 mg  75 mg Oral Daily Barber Pacer, DO   75 mg at 20 0913    ipratropium-albuterol (DUONEB) nebulizer solution 1 ampule  1 ampule Inhalation 4x daily Cuba Haney MD   1 ampule at 20 1410    nitroGLYCERIN (NITROSTAT) SL tablet 0.4 mg  0.4 mg Sublingual Q5 Min PRN Barber Pacer, DO        acetaminophen (TYLENOL) 160 MG/5ML solution 650 mg  650 mg Oral Q4H PRN Barber Pacer, DO   650 mg at 20 0504    potassium chloride 20 mEq/50 mL IVPB (Central Line)  20 mEq Intravenous PRN Barber Pacer, DO 50 mL/hr at 20 0850 20 mEq at 20 0850    lidocaine PF 1  Smoking status: Former Smoker     Packs/day: 1.00     Years: 50.00     Pack years: 50.00     Types: Cigarettes     Last attempt to quit: 2018     Years since quittin.8    Smokeless tobacco: Current User     Types: Chew   Substance Use Topics    Alcohol use: Not Currently     Frequency: Never     Comment: occassional          Review of Systems:    General:      Complaint / Symptom Yes / No / Description if Yes       Fatigue Yes:  chronic   Weight gain NA   Insomnia NA       Respiratory:        Complaint / Symptom Yes / No / Description if Yes       Cough No   Horseness NA       Cardiovascular:    Complaint / Symptom Yes / No / Description if Yes       Chest Pain No   Shortness of Air / Orthopnea Yes: chronic and stable   Presyncope / Syncope No   Palpitations No         Objective:    /64   Pulse 101   Temp 97 °F (36.1 °C)   Resp 18   Ht 6' (1.829 m)   Wt 214 lb 8 oz (97.3 kg)   SpO2 94%   BMI 29.09 kg/m² ,     Intake/Output Summary (Last 24 hours) at 3/26/2020 1757  Last data filed at 3/26/2020 1301  Gross per 24 hour   Intake --   Output 725 ml   Net -725 ml       GENERAL - well developed and well nourished, is somewhat an active participant in this examination  HEENT -  PERRLA, Hearing appears normal, conjunctiva and lids are normal, ears and nose appear normal  NECK - no thyromegaly, no JVD, trachea is in the midline  CARDIOVASCULAR - PMI is in the left mid line clavicular position, Normal S1 and S2 with a grade 1/6 systolic murmur. No S3 or S4    PULMONARY - Yes: mild respiratory distress. scattered wheezes and rales.   Breath sounds in both  lung fields are Decreased  ABDOMEN  - soft, non tender, no rebound, no hepatomegaly or splenomegaly  MUSCULOSKELETAL  - Prone/Supine, digitals and nails are without clubbing or cyanosis  EXTREMITIES - trace edema  NEUROLOGIC - cranial nerves, II-XII, are normal  SKIN - turgor is normal, no rash  PSYCHIATRIC - normal mood and affect, alert and orientated x 3, judgement and insight appear appropriate      ASSESSMENT:    ALL THE CARDIOLOGY PROBLEMS ARE LISTED ABOVE; HOWEVER, THE FOLLOWING SPECIFIC CARDIAC PROBLEMS / CONDITIONS WERE ADDRESSED AND TREATED DURING THE OFFICE VISIT TODAY:                                                                                            MEDICAL DECISION MAKING                   Cardiac Specific Problem / Diagnosis   Discussion and Data Reviewed Diagnostic Procedures Ordered Management Options Selected                 1. Presenting problem / symptom     Probable non Q wave MI with resulting acute respiratory failure  show no change    The patient is a possible candidate for a re cath. Domonique Maribell reviewed his films today and I did place stents I the LAD at the time of his anterior MI. Nicky Mccartney were bare metal stents.  It is possible that they could have restenosed or his had disease progression in the other diffusely diseased arteries.   No Continue current medications:      Yes:                  2. CAD Initial presentation during this evaluation    Review and summation of old records:     2000 stents  6/5/18  Anterior STEMI, AUC indication 2, AUC score 7  6/5/18  Cath 99% mid LAD, 2.5 x 18 and 2.5 x 28 BMS, diffuse CAD, anterior lateral dyskinesis, EF 40%  3/13/2020  Echo  Normal LVFX    No Continue current medications:     Yes:                  3.  Concern regarding systemic blood pressure Initial presentation during this evaluation The blood pressure for the lastr 36 hours has been:  Systolic (00GAQ), CVB:831 , Min:102 , VOX:099    Diastolic (31QJI), KRA:34, Min:61, Max:87    No Continue current medications:        yes         Summary of hospital course thus far:        Date Clinical Event Plan of Treatment           03/16/20 Reviewed films from 6/5/2018 Discussion regarding goal of this clinical course   03/17/20    Awaiting weaning trial and decision on clinical course As noted   03/18/20    Weaning trial of the vent  Depends on clinical course    03/19/20    Weaning on the ventilator Hopefully cath when off vent    03/20/20    On vent, wean trial Cath when off vent    03/24/20    EKG with anterior ST segment depression  will add IV nitro, already on beta blocker    03/25/20    On:  Asa, lipitor, plavix, lisinopril, lopressor Will change IV nitro to PO   03/26/20    Medical management for suspected CAD, once stronger, will need cath ? SNF             PLAN:     1. Continue present medications except for changes as noted above  2. Continue to monitor rhythm  3.  Further orders per clinical course.        Discussed with nursing.     Electronically signed by Geetha Gamboa MD 03/26/20        [facility]

## 2020-03-26 NOTE — CARE COORDINATION
[facility] requested updates and clinicals on pt. SW faxed the vitals record for the pt and completed the COVID-19 questionnaire. [facility]  Ph: [phone]  F: [address] [facility], [address] [place] [address] #[id number] (CPE:[id number]) (78 y.o.  M) (Adm: 03/11/20) [facility] [id number]               Vitals (last 3 days)          Date/Time    Temp    Resp    Pulse    BP    ABP (Arterial line BP)    Patient Position    FiO2     O2 Flow Rate (L/min)    SpO2    O2 Device    Pain Level    Weight    BMI (Calculated)    03/26/20 06:49:46  97.2 (36.2)  16  120  126/87  --  Supine  --  3  91  Nasal cannula  --  --  --    03/26/20 0617  --  --  --  --  --  --  --  --  95  Nasal cannula  --  --  --    03/26/20 0519  --  --  --  --  --  --  --  --  --  --  --  214 lb 8 oz (97.3 kg)  29.2    03/25/20 23:51:20  97 (36.1)  18  96  119/82  --  --  --  --  95  Nasal cannula  --  --  --    03/25/20 2241  --  --  97  --  --  --  --  --  --  --  --  --  --    03/25/20 18:47:46  98.3 (36.8)  18  98  134/79  --  --  --  --  92  Nasal cannula  --  --  --    03/25/20 1822  --  --  --  --  --  --  --  2.5  --  Nasal cannula  --  --  --    03/25/20 1613  --  --  --  --  --  --  --  2.5  --  --  --  --  --    03/25/20 1452  --  --  --  --  --  --  --  3  94  Nasal cannula  --  --  --    03/25/20 14:29:12  97.2 (36.2)  18  93  128/76  --  Supine  --  3  93  Nasal cannula  --  --  --    03/25/20 10:21:29  97.5 (36.4)  18  88  138/76  --  Supine  --  3  95  Nasal cannula  --  --  --    03/25/20 1011  --  --  --  --  --  --  --  3  94  Nasal cannula  --  --  --    03/25/20 06:48:13  97.2 (36.2)  18  97  130/76  --  Supine  --  3  92  Nasal cannula  --  --  --    03/25/20 06:23:19  --  --  90  --  --  --  --  3  94  Nasal cannula  --  --  --    03/25/20 0520  --  --  --  --  --  --  --  --  --  --  --  215 lb 14.4 oz (97.9 kg)  29.3    03/25/20 0505  --  --  --  --  --  --  --  --  --  --  0  --  --    03/25/20 0312  --  --  --  --  --  -- --  --  --  --  0  --  --    03/25/20 0241  98.2 (36.8)  18  89  109/71  --  --  --  --  94  --  --  --  --    03/25/20 0110  --  --  --  --  --  --  --  --  --  --  0  --  --    03/24/20 2308  --  --  --  --  --  --  --  --  --  --  0  --  --    03/24/20 2255  97.9 (36.6)  18  91  122/78  --  --  --  --  95  --  --  --  --    03/24/20 2115  --  --  --  --  --  --  --  --  --  --  0   --  --    Pain Level: denies at this time at 03/24/20 2115 03/24/20 2110  --  --  121  --  --  --  --  --  --  --  --  --  --    03/24/20 2055  --  --  121  136/90  --  --  --  --  --  --  --  --  --    03/24/20 1955  --  --  --  --  --  --  --  --  --  --  --   --  --    Pain Level: denies at this time at 03/24/20 1955 03/24/20 1852  --  --  --  --  --  --  --  2  --  Nasal cannula  --  --  --    03/24/20 18:11:15  97.6 (36.4)  18  92  101/60  --  --  --  --  94  Nasal cannula  --  --  --    03/24/20 1512  --  --  --  --  --  --  --  2  --  Nasal cannula  --  --  --    03/24/20 1353  98.2 (36.8)  18  89  125/69  --  --  --  --  93  Nasal cannula  --  --  --    03/24/20 1328  --  --  90  --  --  --  --  --  --  --  --  --  --    03/24/20 13:26:11  98 (36.7)  20  93  111/66  --  --  --  --  91  Nasal cannula  --  --  --    03/24/20 1200  --  25  92  125/75  --  --  --  --  95  --  --  --  --    03/24/20 1158  --  --  --  127/71  --  --  --  --  --  --  --  --  --    03/24/20 1100  --  19  90  126/69  --  --  --  2  100  Nasal cannula  --  --  --    03/24/20 1000  --  27  99  128/61  --  --  --  --  96  --  --  --  --    03/24/20 0900  --  25  97  --  --  --  --  --  93  --  --  --  --    03/24/20 0800  97.9 (36.6)  23  99  125/67  --  --  --  2  93  Nasal cannula  0  --  --    03/24/20 0715  --  25  --  --  --  --  --  --  100  --  --  --  --    03/24/20 0705  --  22  --  --  --  --  --  2  97  Nasal cannula  --  --  --    03/24/20 0700  --  20  87  125/88  --  --  --  --  96  --  --  --  --    03/24/20 0600  --  19  90  118/69 --  --  --  --  95  --  --  --  --    03/24/20 0526  --  --  --  --  --  --  --  2  94  Nasal cannula  --  --  --    03/24/20 0500  --  18  90  119/64  --  --  --  --  96  --  --  --  --    03/24/20 0400  98.5 (36.9)  23  94  113/58  --  --  --  3  95  Nasal cannula  0  218 lb 3.2 oz (99 kg)  29.7    03/24/20 0348  --  23  96  117/64  --  --  --  4  95  Nasal cannula  --  --  --    03/24/20 0300  --  22  97  121/70  --  --  --  5  95  Nasal cannula  --  --  --    03/24/20 0229  --  22 91  --  --  --  --  6  96  Nasal cannula  --  --  --    03/24/20 0207  --  --  --  --  --  --  40  --  --  Air entrainment mask  --  --  --    03/24/20 0200  --  22  88  121/72  --  --  --  --  96  --  0  --  --    03/24/20 0100  --  25  84  120/69  --  --  --  --  95  --  --  --  --    03/24/20 0045  --  --  --  --  --  --  40  12  97  Air entrainment mask  --  --  --    03/24/20 0000  99.2 (37.3)  25  85  120/66  --  Semi fowlers  50  15  94  Air entrainment mask  1   --  --    Pain Level: heparin and nitro gtt initiated at 03/24/20 0000    03/23/20 2330  --  --  --  --  --  --  --  --  --  --  9  --  --    03/23/20 2325  --  --  --  --  --  --  50  --  85  --  --  --  --    03/23/20 2315  --  --  --  --  --  --  --  --  88  --  --  --  --    03/23/20 2310  --  --  --  --  --  --  35  9  89  Air entrainment mask  --  --  --    03/23/20 2300  --  26  104  132/74  --  --  --  --  91  --  --  --  --    03/23/20 2256  --  24  101  --  --  --  --  --  93  --  --  --  --    03/23/20 2200  --  23  104  137/73  --  --  --  --  94  --  --  --  --    03/23/20 2130  --  20  96  122/63  --  --  --  --  94  --  --  --  --    03/23/20 2100  --  28  112  136/75  --  --  --  6  90  Nasal cannula  --  --  --    03/23/20 2052  99 (37.2)  --  --  --  --  --  --  5  --  Nasal cannula  --  --  --    03/23/20 2000 --  22  114  134/82  --  --  --  4  90  Nasal cannula  0  --  --    03/23/20 1921  --  --  --  --  --  --  --  2  --  Nasal cannula  --  -- --    03/23/20 1900  99.2 (37.3)  21  81  108/79  --  --  --  --  92  --  --  --  --    03/23/20 1843  --  23  80  --  --  --  --  --  93  --  --  --  --    03/23/20 1800  --  20  73  97/45  --  --  --  --  91  --  --  --  --    03/23/20 1700  --  24  74  92/50  --  --  --  --  92  --  --  --  --    03/23/20 1617  --  35  114  --  --  --  35  --  87  --  --  --  --    03/23/20 1605  --  26  78  --  --  --  35  --  95  --  --  --  --    03/23/20 1600  98.4 (36.9)  30  80  106/51  --  Semi fowlers  35  --  94  --  --  --  --    03/23/20 1515  --  22  79  --  --  --  35  --  95  --  --  --  --    03/23/20 1500  --  19  75  87/49  --  --  35  --  96  --  --  --  --    03/23/20 1446  --  14  70  --  --  --  35  --  99  --  --  --  --    03/23/20 1439  --  16  74  --  --  --  40  --  97  Ventilator  --  --  --    03/23/20 1320  --  --  --  --  --  --  --  --  --  --  5  --  --    03/23/20 1200  98.6 (37)  34  101  133/66  --  Semi fowlers  35  --  91  Ventilator  --  --  --    03/23/20 1154  --  24  90  --  --  --  35  --  85  --  --  --  --    03/23/20 1108  --  26  81  --  --  --  30  --  96  --  --  --  --    03/23/20 1059  --  14  --  --  --  --  40  --  97  Ventilator  --  --  --    03/23/20 1053  --  16  75  --  --  --  40  --  96  --  --  --  --    03/23/20 1000  --  17  74  97/59  --  --  40  --  97  --  --  --  --    03/23/20 0900  --  19  76  90/55  --  --  40  --  96  --  --  --  --    03/23/20 0800  98.2 (36.8)  25  80  98/58  --  Semi fowlers  40  --  91  Ventilator  0  --  --    03/23/20 0700  --  18  75  105/57  --  --  40  --  96  --  --  --  --    03/23/20 0625  --  17  --  --  --  --  40  --  94  Ventilator  --  --  --    03/23/20 0623  --  16  76  --  --  --  40  --  94  --  --  --  --    03/23/20 0600  --  18  78  87/48  --  --  40  --  96  --  --  --  --    03/23/20 0500  --  15  78  106/56  --  --  40  --  97  --  --  --  --    03/23/20 0421  --  16  --  --  --  --  40  --  96  --  --  --  --

## 2020-03-26 NOTE — CARE COORDINATION
Sent referral to [facility] Bed for review and awaiting response[facility]    Requested PT orders through attending Dr Anisha Brush via perfect serve who agreed. Notified Jelly Evans in therapy.

## 2020-03-27 VITALS
DIASTOLIC BLOOD PRESSURE: 71 MMHG | RESPIRATION RATE: 18 BRPM | HEIGHT: 72 IN | SYSTOLIC BLOOD PRESSURE: 114 MMHG | WEIGHT: 212.9 LBS | OXYGEN SATURATION: 100 % | HEART RATE: 93 BPM | TEMPERATURE: 97.1 F | BODY MASS INDEX: 28.84 KG/M2

## 2020-03-27 LAB
ANION GAP SERPL CALCULATED.3IONS-SCNC: 11 MMOL/L (ref 7–19)
BLOOD CULTURE, ROUTINE: NORMAL
BUN BLDV-MCNC: 31 MG/DL (ref 8–23)
CALCIUM SERPL-MCNC: 8.1 MG/DL (ref 8.8–10.2)
CHLORIDE BLD-SCNC: 111 MMOL/L (ref 98–111)
CO2: 27 MMOL/L (ref 22–29)
CREAT SERPL-MCNC: 0.7 MG/DL (ref 0.5–1.2)
CULTURE, BLOOD 2: NORMAL
GFR NON-AFRICAN AMERICAN: >60
GLUCOSE BLD-MCNC: 103 MG/DL (ref 70–99)
GLUCOSE BLD-MCNC: 108 MG/DL (ref 70–99)
GLUCOSE BLD-MCNC: 110 MG/DL (ref 70–99)
GLUCOSE BLD-MCNC: 110 MG/DL (ref 74–109)
MAGNESIUM: 2.3 MG/DL (ref 1.6–2.4)
PERFORMED ON: ABNORMAL
POTASSIUM REFLEX MAGNESIUM: 3.1 MMOL/L (ref 3.5–5)
SODIUM BLD-SCNC: 149 MMOL/L (ref 136–145)

## 2020-03-27 PROCEDURE — 92526 ORAL FUNCTION THERAPY: CPT

## 2020-03-27 PROCEDURE — 6370000000 HC RX 637 (ALT 250 FOR IP): Performed by: INTERNAL MEDICINE

## 2020-03-27 PROCEDURE — 6360000002 HC RX W HCPCS: Performed by: INTERNAL MEDICINE

## 2020-03-27 PROCEDURE — 97530 THERAPEUTIC ACTIVITIES: CPT

## 2020-03-27 PROCEDURE — 2700000000 HC OXYGEN THERAPY PER DAY

## 2020-03-27 PROCEDURE — 80048 BASIC METABOLIC PNL TOTAL CA: CPT

## 2020-03-27 PROCEDURE — 97162 PT EVAL MOD COMPLEX 30 MIN: CPT

## 2020-03-27 PROCEDURE — 82947 ASSAY GLUCOSE BLOOD QUANT: CPT

## 2020-03-27 PROCEDURE — 99232 SBSQ HOSP IP/OBS MODERATE 35: CPT | Performed by: INTERNAL MEDICINE

## 2020-03-27 PROCEDURE — 2580000003 HC RX 258: Performed by: INTERNAL MEDICINE

## 2020-03-27 PROCEDURE — 94640 AIRWAY INHALATION TREATMENT: CPT

## 2020-03-27 PROCEDURE — 6370000000 HC RX 637 (ALT 250 FOR IP): Performed by: HOSPITALIST

## 2020-03-27 PROCEDURE — 83735 ASSAY OF MAGNESIUM: CPT

## 2020-03-27 RX ORDER — ISOSORBIDE MONONITRATE 30 MG/1
60 TABLET, EXTENDED RELEASE ORAL DAILY
Qty: 90 TABLET | Refills: 0
Start: 2020-03-27 | End: 2020-04-29

## 2020-03-27 RX ADMIN — ASPIRIN 81 MG 81 MG: 81 TABLET ORAL at 09:11

## 2020-03-27 RX ADMIN — MORPHINE SULFATE 2 MG: 4 INJECTION INTRAVENOUS at 05:37

## 2020-03-27 RX ADMIN — IPRATROPIUM BROMIDE AND ALBUTEROL SULFATE 1 AMPULE: .5; 3 SOLUTION RESPIRATORY (INHALATION) at 06:09

## 2020-03-27 RX ADMIN — TAMSULOSIN HYDROCHLORIDE 0.4 MG: 0.4 CAPSULE ORAL at 09:11

## 2020-03-27 RX ADMIN — SODIUM CHLORIDE, PRESERVATIVE FREE 10 ML: 5 INJECTION INTRAVENOUS at 09:12

## 2020-03-27 RX ADMIN — ISOSORBIDE MONONITRATE 60 MG: 60 TABLET, EXTENDED RELEASE ORAL at 09:11

## 2020-03-27 RX ADMIN — IPRATROPIUM BROMIDE AND ALBUTEROL SULFATE 1 AMPULE: .5; 3 SOLUTION RESPIRATORY (INHALATION) at 14:24

## 2020-03-27 RX ADMIN — POTASSIUM CHLORIDE 20 MEQ: 29.8 INJECTION, SOLUTION INTRAVENOUS at 07:32

## 2020-03-27 RX ADMIN — LISINOPRIL 5 MG: 5 TABLET ORAL at 13:41

## 2020-03-27 RX ADMIN — METOPROLOL TARTRATE 12.5 MG: 25 TABLET, FILM COATED ORAL at 09:11

## 2020-03-27 RX ADMIN — IPRATROPIUM BROMIDE AND ALBUTEROL SULFATE 1 AMPULE: .5; 3 SOLUTION RESPIRATORY (INHALATION) at 10:05

## 2020-03-27 RX ADMIN — POTASSIUM CHLORIDE 20 MEQ: 29.8 INJECTION, SOLUTION INTRAVENOUS at 06:01

## 2020-03-27 RX ADMIN — CLOPIDOGREL BISULFATE 75 MG: 75 TABLET ORAL at 09:11

## 2020-03-27 ASSESSMENT — PAIN SCALES - GENERAL
PAINLEVEL_OUTOF10: 5
PAINLEVEL_OUTOF10: 0

## 2020-03-27 ASSESSMENT — PAIN SCALES - WONG BAKER
WONGBAKER_NUMERICALRESPONSE: 0

## 2020-03-27 ASSESSMENT — PAIN DESCRIPTION - DESCRIPTORS: DESCRIPTORS: ACHING;DISCOMFORT

## 2020-03-27 ASSESSMENT — PAIN DESCRIPTION - FREQUENCY: FREQUENCY: INTERMITTENT

## 2020-03-27 ASSESSMENT — PAIN DESCRIPTION - PAIN TYPE: TYPE: ACUTE PAIN

## 2020-03-27 ASSESSMENT — PAIN DESCRIPTION - PROGRESSION: CLINICAL_PROGRESSION: NOT CHANGED

## 2020-03-27 ASSESSMENT — PAIN DESCRIPTION - LOCATION: LOCATION: BUTTOCKS;BACK

## 2020-03-27 ASSESSMENT — PAIN DESCRIPTION - ONSET: ONSET: GRADUAL

## 2020-03-27 ASSESSMENT — PAIN DESCRIPTION - ORIENTATION: ORIENTATION: LOWER

## 2020-03-27 NOTE — PROGRESS NOTES
History  Social/Functional History  Lives With: Spouse  ADL Assistance: Independent  Ambulation Assistance: Independent  Transfer Assistance: Independent  Cognition        Objective          AROM RLE (degrees)  RLE AROM: WFL  AROM LLE (degrees)  LLE AROM : WFL  Strength Other  Other: ANTIGRAVITY BILAT LE'S        Bed mobility  Supine to Sit: Minimal assistance  Sit to Supine: Minimal assistance  Transfers  Comment: ATTEMPTED STANDING, BUT NOTED pt TO BE WEAK AND UNABLE TO CLEAR BED. WOULD BE APPROP TO WORK ON [address] [address]. NOTIFIED CM THAT Pt ALSO NEEDS OT ORDERS        Balance  Comments: ABLE TO SIT EOB WITH NO LOB WHILE REACHING [address]  Times per week: 5-7  Plan weeks: 2  Current Treatment Recommendations: Gait Training, Transfer Training, Safety Education & Training, Functional Mobility Training, Cognitive/Perceptual Training, Cognitive Reorientation, Patient/Caregiver Education & Training  Plan Comment: DOUBLE, NEEDS OT EVAL, 02, MONITOR SATS.  TRY [address] INITIALLY  Safety Devices  Type of devices: Call light within reach, Bed alarm in place, Gait belt, Left in bed    G-Code       OutComes Score                                                  AM-PAC Score             Goals  Short term goals  Time Frame for Short term goals: 2 WKS  Short term goal 1: SUP<>SIT, CGA  Short term goal 2: SIT>STAND, MIN A 1  Short term goal 3: STAND STEP TF'S WITH RW AND MIN A 1  Short term goal 4:  FT WITH RW AND CGA 1       Therapy Time   Individual Concurrent Group Co-treatment   Time In           Time Out           Minutes                   Carlito Alejandre PT    Electronically signed by Carlito Alejandre PT on 3/27/2020 at 10:52 AM

## 2020-03-27 NOTE — PROGRESS NOTES
[address]    Daily HOSPITAL Progress Note                            Date:  3/27/20  Patient: Yogi Carmona  Admission:  3/11/2020  5:38 PM  Admit DX: NSTEMI (non-ST elevated myocardial infarction) [facility]) [I21.4]  Age:  78 y.o., 1950        Date of Admission 3/11/2020  5:38 PM   Hospital Length of Stay  LOS: 16 days            I personally saw the patient and rounded with:  Jax Mensah RN Nurse [id number] [facility]   RN on 3/27/20    The observations documented in this note, including the assessment   and plan are mine    Portions of this note have been copied forward, from prior notes, yet modified, to reflect today's clinical status of this patient. Reason for initial evaluation or the patient's initial complaint    1. Reason for Hospital Admission: dyspnea        2. Reason for Cardiology Consultation: + troponin         SUBJECTIVE:      Chief Complaint / Reason for the Visit   Follow up of:  dyspnea and CAD / + troponin and systemic arterial hypertension    Family present and in room during examination:  No    At the time of the examination, the patient was:  Lying in bed      Specialty Problems        Cardiology Problems    STEMI (ST elevation myocardial infarction) ([address]        Chest pain        Syncope and collapse        Hypertension        NSTEMI (non-ST elevated myocardial infarction) ([facility]        Acute ST elevation myocardial infarction (STEMI) involving left anterior descending (LAD) coronary artery (HCC)        CAD (coronary artery disease)              Current Status Today According to the patient:  \"ok\"    Subjective:  Mr. Yogi Carmona is generally feeling unchanged. Hopefully going to SNF today    Mr. Yogi Carmona has the following cardiac complaints / symptoms today:    1. dyspnea, stable to improved    2. CAD with + troponin, no chest discomfort    3.  Hypertension    The blood pressure for the lastr 36 hours has been:  Systolic (24AZD), Av , Min:95 , OLO:321    Diastolic (72JEE), DSD:38, Min:59, Max:87        Brenda Goldstein is a 78 y.o. male with the following history as recorded in [facility]:    Patient Active Problem List    Diagnosis Date Noted    Palliative care patient 2020     Priority: Low    NSTEMI (non-ST elevated myocardial infarction) ([facility] 2020     Priority: Low    COPD exacerbation ([facility] 2019     Priority: Low    Hypertension 2019     Priority: Low    Syncope and collapse 2019     Priority: Low    Chest pain 2018     Priority: Low    STEMI (ST elevation myocardial infarction) ([facility] 2018     Priority: Low    CAD (coronary artery disease) 2018    Acute ST elevation myocardial infarction (STEMI) involving left anterior descending (LAD) coronary artery (HCC)     Electronic cigarette use      Current Facility-Administered Medications   Medication Dose Route Frequency Provider Last Rate Last Dose    tamsulosin (FLOMAX) capsule 0.4 mg  0.4 mg Oral Daily Dena Shah MD   0.4 mg at 20 0911    isosorbide mononitrate (IMDUR) extended release tablet 60 mg  60 mg Oral Daily Shaheen Saleh MD   60 mg at 20 0911    lisinopril (PRINIVIL;ZESTRIL) tablet 5 mg  5 mg Oral Daily Arlie Fleischer, DO   5 mg at 20 1341    clopidogrel (PLAVIX) tablet 75 mg  75 mg Oral Daily Arlie Fleischer, DO   75 mg at 20 0911    ipratropium-albuterol (DUONEB) nebulizer solution 1 ampule  1 ampule Inhalation 4x daily Juana Davis MD   1 ampule at 20 1424    nitroGLYCERIN (NITROSTAT) SL tablet 0.4 mg  0.4 mg Sublingual Q5 Min PRN Arlie Fleischer, DO        acetaminophen (TYLENOL) 160 MG/5ML solution 650 mg  650 mg Oral Q4H PRN Arlie Fleischer, DO   650 mg at 20 0504    potassium chloride 20 mEq/50 mL IVPB (Central Line)  20 mEq Intravenous PRN Arlie Fleischer, DO 50 mL/hr at 20 0732 20 mEq at 20 0732    lidocaine PF 1 % orientated x 3, judgement and insight appear appropriate      ASSESSMENT:    ALL THE CARDIOLOGY PROBLEMS ARE LISTED ABOVE; HOWEVER, THE FOLLOWING SPECIFIC CARDIAC PROBLEMS / CONDITIONS WERE ADDRESSED AND TREATED DURING THE HOSPITAL VISIT TODAY:                                                                                            MEDICAL DECISION MAKING                   Cardiac Specific Problem / Diagnosis   Discussion and Data Reviewed Diagnostic Procedures Ordered Management Options Selected                 1. Presenting problem / symptom     Probable non Q wave MI with resulting acute respiratory failure  show no change    The patient is a possible candidate for a re cath. Dasha Mckinnon reviewed his films today and I did place stents I the LAD at the time of his anterior MI. Rose Perales were bare metal stents.  It is possible that they could have restenosed or his had disease progression in the other diffusely diseased arteries.   No Continue current medications:      Yes:                  2. CAD Initial presentation during this evaluation    Review and summation of old records:     2000 stents  6/5/18  Anterior STEMI, AUC indication 2, AUC score 7  6/5/18  Cath 99% mid LAD, 2.5 x 18 and 2.5 x 28 BMS, diffuse CAD, anterior lateral dyskinesis, EF 40%  3/13/2020  Echo  Normal LVFX    No Continue current medications:     Yes:                  3.  Concern regarding systemic blood pressure Initial presentation during this evaluation The blood pressure for the lastr 36 hours has been:  Systolic (24JSY), ZOI:096 , Min:95 , LRB:391    Diastolic (21CJU), DYJ:78, Min:59, Max:87       No Continue current medications:        yes         Summary of hospital course thus far:        Date Clinical Event Plan of Treatment           03/16/20 Reviewed films from 6/5/2018 Discussion regarding goal of this clinical course   03/17/20    Awaiting weaning trial and decision on clinical course As noted   03/18/20    Weaning trial of the vent  Depends on clinical course    03/19/20    Weaning on the ventilator Hopefully cath when off vent    03/20/20    On vent, wean trial Cath when off vent    03/24/20    EKG with anterior ST segment depression  will add IV nitro, already on beta blocker    03/25/20    On:  Asa, lipitor, plavix, lisinopril, lopressor Will change IV nitro to PO   03/26/20    Medical management for suspected CAD, once stronger, will need cath ? SNF   03/27/20    Hopefully will go to snf, reviewed meds Cath when stronger             PLAN:     1. Continue present medications except for changes as noted above  2. Continue to monitor rhythm  3.  Further orders per clinical course.        Discussed with nursing.     Electronically signed by Shaheen Saleh MD 03/26/20 0795 [facility]

## 2020-03-27 NOTE — CARE COORDINATION
Pt's spouse contacted SW via telephone to discuss dc planning. SW explained referrals attempted and awaiting determination of services through [address] swing bed and the spouse was agreeable. Spouse states pt has been having behaviors in the evening and at night lately where he becomes confused and \"if he gets something in his mind he's going to do it! \" SW explained yesterday pt refused feeding tube replacement, last night pulled his rectal tube and this AM refused meds. SW also mentioned the pt has been more cooperative at this time and worked with PT when asked and agreeable to [facility] rehab services upon dc when SW speaks with his about it. Spouse reports having all necessary DME pta, including W/C, hospital bed, BSC and states the adult son also resides in the home.

## 2020-03-27 NOTE — DISCHARGE SUMMARY
position. Signed by Dr Mila Felix on 3/24/2020 2:07 PM    Us Extremity Right Non Vasc Limited    Result Date: 3/23/2020  The subcutaneous fluid accumulation may be postprocedural, a resolving or evolving hematoma. There is less likelihood of an abscess which may not be completely ruled out. Further follow-up may be obtained. Signed by Dr Mila Felix on 3/23/2020 11:47 AM      Discharge Plan   Disposition: To a non-[facility] facility    Provider Follow-Up:   Kia Powell MD  [address] [place] [address]  [address] [address] [address]  [phone]    On 4/29/2020  9:30 am        Patient Instructions   Diet: cardiac diet, 1.5L fluid restriction    Activity: activity as tolerated      Discharge Medications         Medication List      START taking these medications    metoprolol tartrate 25 MG tablet  Commonly known as:  LOPRESSOR  Take 0.5 tablets by mouth 2 times daily        CHANGE how you take these medications    isosorbide mononitrate 30 MG extended release tablet  Commonly known as:  IMDUR  Take 2 tablets by mouth daily  What changed:  See the new instructions.         CONTINUE taking these medications    aspirin 81 MG chewable tablet  Take 1 tablet by mouth daily     atorvastatin 80 MG tablet  Commonly known as:  LIPITOR  TAKE 1 TABLET BY MOUTH ONCE DAILY     furosemide 20 MG tablet  Commonly known as:  LASIX  Take 1 tablet by mouth daily as needed (weight gain of 3 lbs overnight or 5 pounds in a week.)     ipratropium-albuterol 0.5-2.5 (3) MG/3ML Soln nebulizer solution  Commonly known as:  DUONEB  Inhale 3 mLs into the lungs every 4 hours (while awake)     lisinopril 5 MG tablet  Commonly known as:  PRINIVIL;ZESTRIL  TAKE 1 TABLET BY MOUTH DAILY     Mucinex 600 MG extended release tablet  Generic drug:  guaiFENesin     nitroGLYCERIN 0.4 MG SL tablet  Commonly known as:  Nitrostat  Place 1 tablet under the tongue every 5 minutes as needed for

## 2020-03-27 NOTE — PROGRESS NOTES
Pt was agitated and attempted to exit the bed. In the process, he removed his rectal tube. Anus and rectum were intact, no S/S of bleeding noted. Pt has not had liquid stools since. Will continue to monitor.  Electronically signed by Luis Felipe Ferris RN on 3/27/2020 at 6:12 AM

## 2020-03-27 NOTE — PROGRESS NOTES
trials primarily measured 1-2 seconds in length. Min puree consistency residue was noted post swallows; residue cleared from the mouth with additional dry swallows. Oral transit of honey thick liquid presentations, administered via spoon by SLP, primarily measured 1-2 seconds in length.      Indicators of Pharyngeal Phase Dysfunction  Pharyngeal Phase: Exceptions  Suspected Swallow Delay: Puree (Suspect secondary to oral transit times.)  Decreased Laryngeal Elevation: Patient exhibited sluggish, mildly decreased laryngeal elevation for swallow airway protection. Delayed Cough: Ice chips;Puree; Honey thick - spoon   Pharyngeal: Just mild delayed coughs were noted with ice chip trials and puree consistency presentations administered during treatment session this date. Moderate delayed coughs were observed with honey thick liquid trials. Per progress note documentation, patient refused alternative means of nutrition. Patient is also a DNR-CC. At this time, as PO intake is pursued, would recommend continuation current diet. Meds crushed in pudding/applesauce. If patient receives mouth care prior to intake, okay for ice chips IN BETWEEN MEALS for comfort. Will continue to follow.     Electronically signed by MANNY Lincoln on 3/27/2020 at 10:28 AM

## 2020-03-27 NOTE — PLAN OF CARE
the prescribed amount of daily calories will improve  Description: Consumption of the prescribed amount of daily calories will improve  Outcome: Ongoing     Problem: Respiratory:  Goal: Complications related to the disease process, condition or treatment will be avoided or minimized  Description: Complications related to the disease process, condition or treatment will be avoided or minimized  Outcome: Ongoing  Goal: Ability to maintain a clear airway will improve  Description: Ability to maintain a clear airway will improve  Outcome: Ongoing  Goal: Ability to maintain adequate ventilation will improve  Description: Ability to maintain adequate ventilation will improve  Outcome: Ongoing     Problem: Infection - Central Venous Catheter-Associated Bloodstream Infection:  Goal: Will show no infection signs and symptoms  Description: Will show no infection signs and symptoms  Outcome: Ongoing     Problem: Pain:  Goal: Pain level will decrease  Description: Pain level will decrease  Outcome: Ongoing  Goal: Control of acute pain  Description: Control of acute pain  Outcome: Ongoing  Goal: Control of chronic pain  Description: Control of chronic pain  Outcome: Ongoing     Problem: HH PREVENTION OF SKIN BREAKDOWN-PRESSURE ULCER  Goal: Understanding of ways to prevent future skin breakdown will improve  Description: Understanding of ways to prevent future skin breakdown will improve     Outcome: Ongoing     Problem: Confusion - Acute:  Goal: Absence of continued neurological deterioration signs and symptoms  Description: Absence of continued neurological deterioration signs and symptoms  Outcome: Ongoing  Goal: Mental status will be restored to baseline  Description: Mental status will be restored to baseline  Outcome: Ongoing     Problem: Discharge Planning:  Goal: Ability to perform activities of daily living will improve  Description: Ability to perform activities of daily living will improve  Outcome: Ongoing  Goal: Participates in care planning  Description: Participates in care planning  Outcome: Ongoing     Problem: Injury - Risk of, Physical Injury:  Goal: Will remain free from falls  Description: Will remain free from falls  Outcome: Ongoing  Goal: Absence of physical injury  Description: Absence of physical injury  Outcome: Ongoing     Problem: Mood - Altered:  Goal: Mood stable  Description: Mood stable  Outcome: Ongoing  Goal: Absence of abusive behavior  Description: Absence of abusive behavior  Outcome: Ongoing  Goal: Verbalizations of feeling emotionally comfortable while being cared for will increase  Description: Verbalizations of feeling emotionally comfortable while being cared for will increase  Outcome: Ongoing     Problem: Psychomotor Activity - Altered:  Goal: Absence of psychomotor disturbance signs and symptoms  Description: Absence of psychomotor disturbance signs and symptoms  Outcome: Ongoing     Problem: Sensory Perception - Impaired:  Goal: Demonstrations of improved sensory functioning will increase  Description: Demonstrations of improved sensory functioning will increase  Outcome: Ongoing  Goal: Decrease in sensory misperception frequency  Description: Decrease in sensory misperception frequency  Outcome: Ongoing  Goal: Able to refrain from responding to false sensory perceptions  Description: Able to refrain from responding to false sensory perceptions  Outcome: Ongoing  Goal: Demonstrates accurate environmental perceptions  Description: Demonstrates accurate environmental perceptions  Outcome: Ongoing  Goal: Able to distinguish between reality-based and nonreality-based thinking  Description: Able to distinguish between reality-based and nonreality-based thinking  Outcome: Ongoing  Goal: Able to interrupt nonreality-based thinking  Description: Able to interrupt nonreality-based thinking  Outcome: Ongoing     Problem: Sleep Pattern Disturbance:  Goal: Appears well-rested  Description: Appears well-rested  Outcome: Ongoing

## 2020-04-29 ENCOUNTER — OFFICE VISIT (OUTPATIENT)
Dept: CARDIOLOGY | Age: 70
End: 2020-04-29
Payer: MEDICARE

## 2020-04-29 VITALS
HEART RATE: 98 BPM | SYSTOLIC BLOOD PRESSURE: 118 MMHG | HEIGHT: 72 IN | WEIGHT: 209 LBS | DIASTOLIC BLOOD PRESSURE: 78 MMHG | BODY MASS INDEX: 28.31 KG/M2

## 2020-04-29 PROBLEM — I20.89 STABLE ANGINA: Status: ACTIVE | Noted: 2020-04-29

## 2020-04-29 PROBLEM — I25.2 HISTORY OF MYOCARDIAL INFARCTION: Status: ACTIVE | Noted: 2020-04-29

## 2020-04-29 PROBLEM — J44.9 CHRONIC OBSTRUCTIVE PULMONARY DISEASE (HCC): Status: ACTIVE | Noted: 2019-04-18

## 2020-04-29 PROBLEM — Z95.5 HISTORY OF CORONARY ARTERY STENT PLACEMENT: Status: ACTIVE | Noted: 2020-04-29

## 2020-04-29 PROBLEM — R04.0 EPISTAXIS: Status: ACTIVE | Noted: 2020-04-29

## 2020-04-29 PROBLEM — I20.8 STABLE ANGINA (HCC): Status: ACTIVE | Noted: 2020-04-29

## 2020-04-29 PROBLEM — E78.2 MIXED HYPERLIPIDEMIA: Status: ACTIVE | Noted: 2020-04-29

## 2020-04-29 PROCEDURE — 99214 OFFICE O/P EST MOD 30 MIN: CPT | Performed by: NURSE PRACTITIONER

## 2020-04-29 PROCEDURE — G8417 CALC BMI ABV UP PARAM F/U: HCPCS | Performed by: NURSE PRACTITIONER

## 2020-04-29 PROCEDURE — G8427 DOCREV CUR MEDS BY ELIG CLIN: HCPCS | Performed by: NURSE PRACTITIONER

## 2020-04-29 PROCEDURE — G8926 SPIRO NO PERF OR DOC: HCPCS | Performed by: NURSE PRACTITIONER

## 2020-04-29 PROCEDURE — 3023F SPIROM DOC REV: CPT | Performed by: NURSE PRACTITIONER

## 2020-04-29 PROCEDURE — 3017F COLORECTAL CA SCREEN DOC REV: CPT | Performed by: NURSE PRACTITIONER

## 2020-04-29 PROCEDURE — 1123F ACP DISCUSS/DSCN MKR DOCD: CPT | Performed by: NURSE PRACTITIONER

## 2020-04-29 PROCEDURE — 4004F PT TOBACCO SCREEN RCVD TLK: CPT | Performed by: NURSE PRACTITIONER

## 2020-04-29 PROCEDURE — 4040F PNEUMOC VAC/ADMIN/RCVD: CPT | Performed by: NURSE PRACTITIONER

## 2020-04-29 RX ORDER — FERROUS SULFATE 325(65) MG
325 TABLET ORAL
COMMUNITY

## 2020-04-29 RX ORDER — FAMOTIDINE 20 MG/1
20 TABLET, FILM COATED ORAL 2 TIMES DAILY
COMMUNITY

## 2020-04-29 RX ORDER — ISOSORBIDE MONONITRATE 30 MG/1
30 TABLET, EXTENDED RELEASE ORAL DAILY
Qty: 15 TABLET | Refills: 0
Start: 2020-04-29 | End: 2020-06-25

## 2020-04-29 RX ORDER — POTASSIUM CHLORIDE 750 MG/1
10 CAPSULE, EXTENDED RELEASE ORAL 2 TIMES DAILY
COMMUNITY

## 2020-04-29 NOTE — PROGRESS NOTES
[facility] [address] [place] [address]. [address], Mukesh Naresh [address] [address] [facility]  [phone] office  [phone] fax      OFFICE VISIT:  2020    Christina Lopes - : 1950  Reason For Visit:  Butch Petersen is a 78 y.o. male who is here for Follow-up (HFU   patient has soa) and Coronary Artery Disease    History:   Diagnosis Orders   1. Coronary artery disease involving native coronary artery of native heart, angina presence unspecified     2. History of myocardial infarction     3. Essential hypertension     4. Mixed hyperlipidemia     5. History of coronary artery stent placement      18  Cath 99% mid LAD, 2.5 x 18 and 2.5 x 28 BMS, diffuse CAD, anterior lateral dyskinesis, EF 40%   6. Chronic obstructive pulmonary disease, unspecified COPD type ([address]     7. Stable angina (HCC)     8. Epistaxis     9. Arm DVT (deep venous thromboembolism), acute, right ([address]      [facility] admission 3/27/20 - Eliquis started   10. Chronic anticoagulation      Eliquis       The patient presents today for cardiology hospital follow up. The patient was transferred from an outside facility on 3/27/20 with respiratory distress and elevated troponin. Patient required intubation in route. He was admitted to CCU and evaluated by cardiology. Initially, planned for a cath, however, medical management was opted. He did develop some bradycardia during the stay and BB was decreased. Patient had epistaxis as well which resolved with packing. On 3/22/20, the patient developed af fever. CXR showed concern of pneumonia. Antibiotic therapy was initiated. The patient has as hx of CAD s/p (2) BMS to mid LAD on 18. EKG changes at that time were consistent with anterior MI. Cath showed diffuse severe multiple vessel CAD involving distal left main, entire LAD, circumflex and RCA vessels. EF was 40%.  Patient was referred to CTS but he was not a candidate Z78.9    STEMI (ST elevation myocardial infarction) ([facility] I21.3    Chest pain R07.9    Syncope and collapse R55    COPD exacerbation ([facility] J44.1    Essential hypertension I10    NSTEMI (non-ST elevated myocardial infarction) ([facility] I21.4    Palliative care patient Z51.5    Mixed hyperlipidemia E78.2    History of myocardial infarction I25.2    History of coronary artery stent placement Z95.5     Past Medical History:   Diagnosis Date    CAD (coronary artery disease)     Chest pain 2018    CHF (congestive heart failure) ([facility]     COPD (chronic obstructive pulmonary disease) ([facility]     Hyperlipidemia     Hypertension     Palliative care patient 2020    Pneumonia      Past Surgical History:   Procedure Laterality Date    APPENDECTOMY      BACK SURGERY      COLONOSCOPY      ENDOSCOPY, COLON, DIAGNOSTIC       Family History   Problem Relation Age of Onset    Cancer Sister     Cancer Brother      Social History     Tobacco Use    Smoking status: Former Smoker     Packs/day: 1.00     Years: 50.00     Pack years: 50.00     Types: Cigarettes     Last attempt to quit: 2018     Years since quittin.9    Smokeless tobacco: Current User     Types: Chew   Substance Use Topics    Alcohol use: Not Currently     Frequency: Never     Comment: occassional      Current Outpatient Medications   Medication Sig Dispense Refill    apixaban (ELIQUIS) 5 MG TABS tablet Take 5 mg by mouth 2 times daily      famotidine (PEPCID) 20 MG tablet Take 20 mg by mouth 2 times daily      ferrous sulfate (IRON 325) 325 (65 Fe) MG tablet Take 325 mg by mouth daily (with breakfast)      potassium chloride (MICRO-K) 10 MEQ extended release capsule Take 10 mEq by mouth 2 times daily      isosorbide mononitrate (IMDUR) 30 MG extended release tablet Take 2 tablets by mouth daily 90 tablet 0    metoprolol tartrate (LOPRESSOR) 25 MG tablet Take 0.5 tablets by mouth 2 times daily 60 tablet 0    lisinopril (PRINIVIL;ZESTRIL) 5 MG tablet TAKE 1 TABLET BY MOUTH DAILY 90 tablet 3    atorvastatin (LIPITOR) 80 MG tablet TAKE 1 TABLET BY MOUTH ONCE DAILY 30 tablet 0    tamsulosin (FLOMAX) 0.4 MG capsule Take 0.4 mg by mouth daily      guaiFENesin (MUCINEX) 600 MG extended release tablet Take 600 mg by mouth daily      prasugrel (EFFIENT) 10 MG TABS TAKE 1 TABLET BY MOUTH DAILY 30 tablet 0    furosemide (LASIX) 20 MG tablet Take 1 tablet by mouth daily as needed (weight gain of 3 lbs overnight or 5 pounds in a week.) 90 tablet 1    nitroGLYCERIN (NITROSTAT) 0.4 MG SL tablet Place 1 tablet under the tongue every 5 minutes as needed for Chest pain 25 tablet 3    aspirin 81 MG chewable tablet Take 1 tablet by mouth daily 30 tablet 3    ipratropium-albuterol (DUONEB) 0.5-2.5 (3) MG/3ML SOLN nebulizer solution Inhale 3 mLs into the lungs every 4 hours (while awake) 360 mL 2     No current facility-administered medications for this visit. Allergies: Patient has no known allergies. Review of Systems  Constitutional - no appetite change, or unexpected weight change. No fever, chills or diaphoresis. + fatigue. HEENT - no significant rhinorrhea or epistaxis. No tinnitus or significant hearing loss. Patient reports planning for biopsy soon of thyroid nodule. Eyes - no sudden vision change or amaurosis. No corneal arcus, xantholasma, subconjunctival hemorrhage or discharge. Respiratory - no significant wheezing, stridor, apnea or cough. + chronic SOA with severe COPD. Respiratory status much improved on continuous oxygen therapy. Cardiovascular - no orthopnea or PND. No sensation of sustained arrythmia. No occurrence of slow heart rate. No palpitations. No claudication. + one episode chest pain with walking to bathroom. Relief with NTG sl and rest.  Hx diffuse inoperable CAD. Gastrointestinal - no abdominal swelling or pain. No blood in stool. No severe constipation, diarrhea, nausea, or vomiting. Genitourinary - no dysuria, frequency, or urgency. No flank pain or hematuria. Musculoskeletal - no back pain or myalgia. Transported via wheelchair. Extremities - no clubbing, cyanosis or extremity edema. Skin - no color change or rash. No pallor. No new surgical incision. Neurologic - no speech difficulty, facial asymmetry or lateralizing weakness. No seizures, presyncope or syncope. No significant dizziness. Hematologic - no easy bruising or excessive bleeding. Reports nosebleeds. Psychiatric - no severe anxiety or insomnia. No confusion. All other review of systems are negative. Objective  Vital Signs - /78   Pulse 107   Ht 6' (1.829 m)   Wt 209 lb (94.8 kg)   BMI 28.35 kg/m²   General - Damaris Pryor is alert, cooperative, and pleasant. Well groomed. No acute distress. Body habitus - Body mass index is 28.35 kg/m². HEENT - Head is normocephalic. No circumoral cyanosis. Dentition is normal.  EYES -   Lids normal without ptosis. No discharge, edema or subconjunctival hemorrhage. Neck - Symmetrical without apparent mass or lymphadenopathy. Respiratory - Normal respiratory effort without use of accessory muscles. Ausculatation reveals vesicular breath sounds without crackles, wheezes, rub or rhonchi. Lung sounds diminished bases. Cardiovascular - No jugular venous distention. Auscultation reveals regular rate and rhythm. No audible clicks, gallop or rub. No murmur. No lower extremity varicosities. No carotid bruits. Abdominal -  No visible distention, mass or pulsations. Extremities - No clubbing or cyanosis. No statis dermatitis or ulcers. No edema. Musculoskeletal -   No Osler's nodes. No kyphosis or scoliosis. Gait is even and regular without limp or shuffle. Transported via wheelchair. Skin -  Warm and dry; no rash or pallor. No new surgical wound. Neurological - No focal neurological deficits. Thought processes coherent. No apparent tremor. 209 lb (94.8 kg)   03/27/20 212 lb 14.4 oz (96.6 kg)   02/10/20 221 lb (100.2 kg)     Plan  Previous cardiac history and records reviewed. Continue current medical management. Continue other current medications as directed. Continue to follow up with primary care provider for non cardiac medical problems. Call the office with any problems, questions or concerns at [phone]. Cardiology follow up: one month Dr. Lashay Lopez. Educational included in patient instructions. Avoid heart failure triggers.      CHALO Wray

## 2020-04-29 NOTE — PATIENT INSTRUCTIONS
New instructions for today:  *Weigh yourself without clothing at the same time each day. Record your weight. Call the office if you gain 3 pounds or more in 2 to 3 days or 5 pounds in one week. A sudden weight gain may mean that your heart failure is getting worse. *Sodium causes your body to hold on to extra water. This may cause your heart failure symptoms to get worse. Limit sodium to 2,000 milligrams (mg) a day or less. That is less than 1 teaspoon of salt a day, including all the salt you eat in cooking or in packaged foods. How to take:  NITROGLYCERIN (Nitrostat) 0.4 mg tablets, sublingual.  Nitroglycerin is in a group of drugs called nitrates. Nitroglycerin dilates (widens) blood vessels, making it easier for blood to flow through them and easier for the heart to pump. Dosing Guidelines for Nitroglycerin Tablets  · At the start of an angina (chest pain) attack, place one tablet under the tongue or between the cheek and gum. Do not swallow or chew the tablet; let it dissolve on its own. If necessary, a second and third tablet may be used, with five minutes between using each tablet. If you use a third tablet and your chest pain continues, it is time to seek immediate medical attention. Call 911 immediately and have someone drive you to the emergency room. You may be having a heart attack or other serious heart problem. · To prevent angina from exercise or stress, use 1 tablet 5 to 10 minutes before the activity. Patient Instructions:  Continue current medications as prescribed. Always keep a current medication list. Bring your medications to every office visit. Continue to follow up with primary care provider for non cardiac medical problems. Call the office with any problems, questions or concerns at [phone]. If you have been asked to keep a blood pressure log, do so for 2 weeks. Call the office to report readings to the triage nurse at [phone].   Follow up with cardiologist Instructions  Your Care Instructions    Triggers are anything that make your heart failure flare up. A flare-up is also called \"sudden heart failure\" or \"acute heart failure. \" When you have a flare-up, fluid builds up in your lungs, and you have problems breathing. You might need to go to the hospital. By watching for changes in your condition and avoiding triggers, you can prevent heart failure flare-ups. Follow-up care is a key part of your treatment and safety. Be sure to make and go to all appointments, and call your doctor if you are having problems. It's also a good idea to know your test results and keep a list of the medicines you take. How can you care for yourself at home? Watch for changes in your weight and condition  · Weigh yourself without clothing at the same time each day. Record your weight. Call your doctor if you have sudden weight gain, such as more than 2 to 3 pounds in a day or 5 pounds in a week. (Your doctor may suggest a different range of weight gain.) A sudden weight gain may mean that your heart failure is getting worse. · Keep a daily record of your symptoms. Write down any changes in how you feel, such as new shortness of breath, cough, or problems eating. Also record if your ankles are more swollen than usual and if you feel more tired than usual. Note anything that you ate or did that could have triggered these changes. Limit sodium  Sodium causes your body to hold on to extra water. This may cause your heart failure symptoms to get worse. People get most of their sodium from processed foods. Fast food and restaurant meals also tend to be very high in sodium. · Your doctor may suggest that you limit sodium. Your doctor can tell you how much sodium is right for you. This includes limiting sodium in cooked and packaged foods. · Read food labels on cans and food packages. They tell you how much sodium you get in one serving. Check the serving size.  If you eat more than one this list with you when you go to any doctor. · Take your medicines at the same time every day. It may help you to post a list of all the medicines you take every day and what time of day you take them. · Make taking your medicine as simple as you can. Plan times to take your medicines when you are doing other things, such as eating a meal or getting ready for bed. This will make it easier to remember to take your medicines. · Get organized. Use helpful tools, such as daily or weekly pill containers. When should you call for help? Call 911 if you have symptoms of sudden heart failure such as:    · You have severe trouble breathing.     · You cough up pink, foamy mucus.     · You have a new irregular or rapid heartbeat.    Call your doctor now or seek immediate medical care if:    · You have new or increased shortness of breath.     · You are dizzy or lightheaded, or you feel like you may faint.     · You have sudden weight gain, such as more than 2 to 3 pounds in a day or 5 pounds in a week. (Your doctor may suggest a different range of weight gain.)     · You have increased swelling in your legs, ankles, or feet.     · You are suddenly so tired or weak that you cannot do your usual activities.    Watch closely for changes in your health, and be sure to contact your doctor if you develop new symptoms. Where can you learn more? Go to https://[employer]. org and sign in to your [employer] account. Enter X674 in the [facility] box to learn more about \"Avoiding Triggers With Heart Failure: Care Instructions. \"     If you do not have an account, please click on the \"Sign Up Now\" link. Current as of: December 15, 2019Content Version: 12.4  © 0297-4217 Healthwise, Incorporated. Care instructions adapted under license by [facility] ([facility]).  If you have questions about a medical condition or this instruction, always ask your healthcare professional. Kelly Porter any warranty or liability for your use of this information.

## 2020-04-30 ENCOUNTER — TELEPHONE (OUTPATIENT)
Dept: CARDIOLOGY | Age: 70
End: 2020-04-30

## 2020-04-30 PROBLEM — Z79.01 CHRONIC ANTICOAGULATION: Status: ACTIVE | Noted: 2020-04-30

## 2020-04-30 PROBLEM — I82.621 ARM DVT (DEEP VENOUS THROMBOEMBOLISM), ACUTE, RIGHT (HCC): Status: ACTIVE | Noted: 2020-04-30

## 2020-04-30 NOTE — TELEPHONE ENCOUNTER
Patient seen in the hospital yesterday for follow up after admission for respiratory failure and elevated troponin. Cardiology was consulted and cath was deferred. Patient had a cath on 6/5/18 with BMS x 2 placed to LAD. CTS declined surgery due to high risk. Cath showed diffuse severe multiple vessel CAD involving distal left main, entire LAD, circumflex and RCA vessels. EF was 40%. Patient was placed on Effient post BMS in 2018 which was never stopped due to complexity of CAD. The patient was seen yesterday and Eliquis had been added to med list which was not hospital discharge med list. Subsequently, [facility] contacted for records from 3/27/20. He was placed on Eliquis due to DVT right arm. He also received blood transfusion for anemia. While hospitalized at [facility] ([facility]), the patient had epistaxis. Yesterday, wife reprots continued nose bleeds on Eliquis and Effient. Plan to consult Dr. Sheri Valentino about possibility of discontinuing Effient.   CHALO Pressley

## 2020-05-26 ENCOUNTER — OFFICE VISIT (OUTPATIENT)
Dept: CARDIOLOGY | Age: 70
End: 2020-05-26
Payer: MEDICARE

## 2020-05-26 VITALS
HEIGHT: 72 IN | DIASTOLIC BLOOD PRESSURE: 72 MMHG | WEIGHT: 213 LBS | HEART RATE: 56 BPM | SYSTOLIC BLOOD PRESSURE: 138 MMHG | BODY MASS INDEX: 28.85 KG/M2

## 2020-05-26 PROCEDURE — 3017F COLORECTAL CA SCREEN DOC REV: CPT | Performed by: INTERNAL MEDICINE

## 2020-05-26 PROCEDURE — 1123F ACP DISCUSS/DSCN MKR DOCD: CPT | Performed by: INTERNAL MEDICINE

## 2020-05-26 PROCEDURE — 4040F PNEUMOC VAC/ADMIN/RCVD: CPT | Performed by: INTERNAL MEDICINE

## 2020-05-26 PROCEDURE — G8417 CALC BMI ABV UP PARAM F/U: HCPCS | Performed by: INTERNAL MEDICINE

## 2020-05-26 PROCEDURE — 99212 OFFICE O/P EST SF 10 MIN: CPT | Performed by: INTERNAL MEDICINE

## 2020-05-26 PROCEDURE — G8427 DOCREV CUR MEDS BY ELIG CLIN: HCPCS | Performed by: INTERNAL MEDICINE

## 2020-05-26 PROCEDURE — 4004F PT TOBACCO SCREEN RCVD TLK: CPT | Performed by: INTERNAL MEDICINE

## 2020-05-26 NOTE — PROGRESS NOTES
significant edema   NEUROLOGIC - gait and station are normal  SKIN - turgor is normal  PSYCHIATRIC - normal mood and affect, alert and orientated x 3,      ASSESSMENT:    ALL THE CARDIOLOGY PROBLEMS ARE LISTED ABOVE; HOWEVER, THE FOLLOWING SPECIFIC CARDIAC PROBLEMS / CONDITIONS WERE ADDRESSED AND TREATED DURING THE OFFICE VISIT TODAY:                                                                                            MEDICAL DECISION MAKING             Cardiac Specific Problem / Diagnosis  Discussion and Data Reviewed Diagnostic Procedures Ordered Management Options Selected           1. CAD  show no change   Review and summation of old records:    Stable at this time will still plan on cathing once patient is stronger No Continue current medications:     Yes           2. HTN  show no change   Patient has a history of these risk factors, which are managed medically, and are on current oral therapy I personally addressed, counselled and educated the patient on this problem / risk factor. I will personally continue and manage prescribed medications and monitor the course of the therapy. No Continue current medications:    {Yes           3. Hyperlipidemia  show no change   Managed by Dr. Riaz Warren NA Continue current medications:       Yes         Discussed with patient and spouse. Follow Up Visit Scheduled for:  2 weeks    I greatly appreciate the opportunity to meet Masha Santana and your confidence in allowing me to participate in his cardiovascular care. [facility] of [address] [place] [address] am scribing for and in the presence of CELESTE Baumann MD,FACC. Marjan Russell MA    5/26/20 11:42 AM  ICELESTE MD, [facility], personally performed the services described in this documentation as scribed by Marjan Russell in my presence, and it is both accurate and complete. Electronically signed by Dusty Wisdom.  Lizandro Baumann MD, [facility]    5/26/20 11:45 AM

## 2020-06-09 ENCOUNTER — OFFICE VISIT (OUTPATIENT)
Dept: CARDIOLOGY | Age: 70
End: 2020-06-09
Payer: MEDICARE

## 2020-06-09 VITALS
HEIGHT: 72 IN | HEART RATE: 110 BPM | BODY MASS INDEX: 28.71 KG/M2 | WEIGHT: 212 LBS | DIASTOLIC BLOOD PRESSURE: 74 MMHG | SYSTOLIC BLOOD PRESSURE: 132 MMHG

## 2020-06-09 PROCEDURE — G8417 CALC BMI ABV UP PARAM F/U: HCPCS | Performed by: INTERNAL MEDICINE

## 2020-06-09 PROCEDURE — 4040F PNEUMOC VAC/ADMIN/RCVD: CPT | Performed by: INTERNAL MEDICINE

## 2020-06-09 PROCEDURE — 4004F PT TOBACCO SCREEN RCVD TLK: CPT | Performed by: INTERNAL MEDICINE

## 2020-06-09 PROCEDURE — 3017F COLORECTAL CA SCREEN DOC REV: CPT | Performed by: INTERNAL MEDICINE

## 2020-06-09 PROCEDURE — 99212 OFFICE O/P EST SF 10 MIN: CPT | Performed by: INTERNAL MEDICINE

## 2020-06-09 PROCEDURE — G8427 DOCREV CUR MEDS BY ELIG CLIN: HCPCS | Performed by: INTERNAL MEDICINE

## 2020-06-09 PROCEDURE — 1123F ACP DISCUSS/DSCN MKR DOCD: CPT | Performed by: INTERNAL MEDICINE

## 2020-06-09 NOTE — PROGRESS NOTES
[id number] [facility] Patient Office Visit    [address] [id number]  Phone: [phone]  Fax: [phone]        6/9/2020    Chief Complaint / Reason for the Visit   Follow up of:  CAD and HTN and Hyperlipdiemia      Specialty Problems        Cardiology Problems    Acute ST elevation myocardial infarction (STEMI) involving left anterior descending (LAD) coronary artery (HCC)        Coronary artery disease involving native coronary artery of native heart        STEMI (ST elevation myocardial infarction) ([address]        Chest pain        Syncope and collapse        Essential hypertension        NSTEMI (non-ST elevated myocardial infarction) ([address]        Mixed hyperlipidemia        Stable angina (HCC)        Arm DVT (deep venous thromboembolism), acute, right ([address]              Current Status Today According to the patient:  \"I'm just having a bad day today \"    Subjective:  Mr. Rusty Mcguire is generally feeling gradually worsening. Mr. Rusty Mcguire has the following cardiac complaints / symptoms today:    1. CAD, having some Chest pain and SOB today     2. HTN, Is stable on current medications     3.  Hyperlipidemia, managed by Dr. Jair Tucker is a 78 y.o. male with the following history as recorded in [facility]:    Patient Active Problem List    Diagnosis Date Noted    Arm DVT (deep venous thromboembolism), acute, right ([address] 04/30/2020    Chronic anticoagulation 04/30/2020    Mixed hyperlipidemia 04/29/2020    History of myocardial infarction 04/29/2020    History of coronary artery stent placement 04/29/2020    Stable angina ([address] 04/29/2020    Epistaxis 04/29/2020    Palliative care patient 03/12/2020    NSTEMI (non-ST elevated myocardial infarction) ([address] 03/11/2020    Chronic obstructive pulmonary disease ([address] 04/18/2019    Essential hypertension 04/18/2019    Syncope and collapse 04/17/2019    Chest pain 11/14/2018 History:   Diagnosis Date    CAD (coronary artery disease)     Chest pain 2018    CHF (congestive heart failure) (HCC)     COPD (chronic obstructive pulmonary disease) (HCC)     Hyperlipidemia     Hypertension     Palliative care patient 2020    Pneumonia      Past Surgical History:   Procedure Laterality Date    APPENDECTOMY      BACK SURGERY      COLONOSCOPY      ENDOSCOPY, COLON, DIAGNOSTIC       Family History   Problem Relation Age of Onset    Cancer Sister     Cancer Brother      Social History     Tobacco Use    Smoking status: Former Smoker     Packs/day: 1.00     Years: 50.00     Pack years: 50.00     Types: Cigarettes     Last attempt to quit: 2018     Years since quittin.0    Smokeless tobacco: Current User     Types: Chew   Substance Use Topics    Alcohol use: Not Currently     Frequency: Never     Comment: occassional          Review of Systems:    General:      Complaint / Symptom Yes / No / Description if Yes       Fatigue No   Weight gain N/A   Insomnia N/A       Respiratory:        Complaint / Symptom Yes / No / Description if Yes       Cough No   Horseness N/A       Cardiovascular:    Complaint / Symptom Yes / No / Description if Yes       Chest Pain Yes: since yesterday    Shortness of Air / Orthopnea Yes: since yesterday    Presyncope / Syncope No   Palpitations No         Objective:    /74   Pulse 110   Ht 6' (1.829 m)   Wt 212 lb (96.2 kg)   BMI 28.75 kg/m²     GENERAL - well developed and well nourished, conversant  HEENT -  PERRLA, Hearing appears normal  NECK - no thyromegaly, no JVD, trachea is in the midline  CARDIOVASCULAR - PMI is in the mid line clavicular position, Normal S1 and S2 with a grade 1/6 systolic murmur. No S3 or S4    PULMONARY - no respiratory distress. No wheezes or rales.  Lungs are clear to ausculation   ABDOMEN  - soft, non tender, no rebound  MUSCULOSKELETAL  - range of motion of the upper and lower extermites appears

## 2020-06-25 RX ORDER — ISOSORBIDE MONONITRATE 30 MG/1
TABLET, EXTENDED RELEASE ORAL
Qty: 90 TABLET | Refills: 0 | Status: SHIPPED | OUTPATIENT
Start: 2020-06-25 | End: 2020-07-21

## 2020-07-17 ENCOUNTER — TELEPHONE (OUTPATIENT)
Dept: CARDIOLOGY | Age: 70
End: 2020-07-17

## 2020-07-17 NOTE — TELEPHONE ENCOUNTER
Called and unable to hear Pt on other end. Pt is scheduled with JDT on 07/28/2020.  Pt needs to be rescheduled with another MD at main location due to JDT no longer with out clinic

## 2020-07-21 RX ORDER — ISOSORBIDE MONONITRATE 30 MG/1
TABLET, EXTENDED RELEASE ORAL
Qty: 90 TABLET | Refills: 0 | Status: SHIPPED | OUTPATIENT
Start: 2020-07-21 | End: 2020-10-22 | Stop reason: SDUPTHER

## 2020-08-25 ENCOUNTER — OFFICE VISIT (OUTPATIENT)
Dept: CARDIOLOGY | Age: 70
End: 2020-08-25
Payer: MEDICARE

## 2020-08-25 VITALS
DIASTOLIC BLOOD PRESSURE: 74 MMHG | HEART RATE: 96 BPM | SYSTOLIC BLOOD PRESSURE: 126 MMHG | HEIGHT: 72 IN | BODY MASS INDEX: 28.71 KG/M2 | WEIGHT: 212 LBS

## 2020-08-25 PROCEDURE — 1123F ACP DISCUSS/DSCN MKR DOCD: CPT | Performed by: CLINICAL NURSE SPECIALIST

## 2020-08-25 PROCEDURE — G8427 DOCREV CUR MEDS BY ELIG CLIN: HCPCS | Performed by: CLINICAL NURSE SPECIALIST

## 2020-08-25 PROCEDURE — G8926 SPIRO NO PERF OR DOC: HCPCS | Performed by: CLINICAL NURSE SPECIALIST

## 2020-08-25 PROCEDURE — 99213 OFFICE O/P EST LOW 20 MIN: CPT | Performed by: CLINICAL NURSE SPECIALIST

## 2020-08-25 PROCEDURE — 3023F SPIROM DOC REV: CPT | Performed by: CLINICAL NURSE SPECIALIST

## 2020-08-25 PROCEDURE — 4004F PT TOBACCO SCREEN RCVD TLK: CPT | Performed by: CLINICAL NURSE SPECIALIST

## 2020-08-25 PROCEDURE — 3017F COLORECTAL CA SCREEN DOC REV: CPT | Performed by: CLINICAL NURSE SPECIALIST

## 2020-08-25 PROCEDURE — G8417 CALC BMI ABV UP PARAM F/U: HCPCS | Performed by: CLINICAL NURSE SPECIALIST

## 2020-08-25 PROCEDURE — 4040F PNEUMOC VAC/ADMIN/RCVD: CPT | Performed by: CLINICAL NURSE SPECIALIST

## 2020-08-25 RX ORDER — PREDNISONE 10 MG/1
TABLET ORAL
COMMUNITY

## 2020-08-25 RX ORDER — FLUTICASONE FUROATE, UMECLIDINIUM BROMIDE AND VILANTEROL TRIFENATATE 100; 62.5; 25 UG/1; UG/1; UG/1
1 POWDER RESPIRATORY (INHALATION) DAILY
COMMUNITY

## 2020-08-25 ASSESSMENT — ENCOUNTER SYMPTOMS
CHEST TIGHTNESS: 1
NAUSEA: 0
EYE REDNESS: 0
COUGH: 0
WHEEZING: 0
VOMITING: 0
SHORTNESS OF BREATH: 1
FACIAL SWELLING: 0
ABDOMINAL PAIN: 0

## 2020-08-25 NOTE — PATIENT INSTRUCTIONS
Return in about 3 months (around 11/25/2020) for Dr. Aime Butler. Use Nitro as needed  Call for increasing frequency of chest pain    Call with any questionsor concerns  Follow up with Linnette Currie MD for non cardiac problems  Report any new problems  Cardiovascular Fitness-Exercise as tolerated. Strive for 15 minutes of exercise most days of the week. Cardiac / HealthyDiet  Continue current medications as directed  Continue plan of treatment  It is always recommended that you bring your medicationsbottles with you to each visit - this is for your safety!

## 2020-08-25 NOTE — PROGRESS NOTES
2019    Syncope and collapse 2019    Chest pain 2018    Coronary artery disease involving native coronary artery of native heart 2018    [facility]    Acute ST elevation myocardial infarction (STEMI) involving left anterior descending (LAD) coronary artery ([facility]     Electronic cigarette use      Past Medical History:   Diagnosis Date    CAD (coronary artery disease)     Chest pain 2018    CHF [facility]congestive heart failure) ([facility]     COPD (chronic obstructive pulmonary disease) ([facility])     Hyperlipidemia     Hypertension     Palliative care patient 2020    Pneumonia      Past Surgical History:   Procedure Laterality Date    APPENDECTOMY      BACK SURGERY      COLONOSCOPY      ENDOSCOPY, COLON, DIAGNOSTIC       Family History   Problem Relation Age of Onset    Cancer Sister     Cancer Brother      Social History     Tobacco Use    Smoking status: Former Smoker     Packs/day: 1.00     Years: 50.00     Pack years: 50.00     Types: Cigarettes     Last attempt to quit: 2018     Years since quittin.2    Smokeless tobacco: Current User     Types: Chew   Substance Use Topics    Alcohol use: Not Currently     Frequency: Never     Comment: occassional      Current Outpatient Medications   Medication Sig Dispense Refill    predniSONE (DELTASONE) 10 MG tablet prednisone 10 mg tablet      fluticasone-umeclidin-vilant (TRELEGY ELLIPTA) 100-62.5-25 MCG/INH AEPB Inhale 1 puff into the lungs daily      isosorbide mononitrate (IMDUR) 30 MG extended release tablet TAKE ONE TABLET BY MOUTH ONCE A DAY *DO NOT TAKE IF SYSTOLIC BLOOD PRESSURE IS LESS THAN 100* 90 tablet 0    famotidine (PEPCID) 20 MG tablet Take 20 mg by mouth 2 times daily      ferrous sulfate (IRON 325) 325 (65 Fe) MG tablet Take 325 mg by mouth daily (with breakfast)      potassium chloride (MICRO-K) 10 MEQ extended release capsule Take 10 mEq by mouth 2 Hematological: Does not bruise/bleed easily. Psychiatric/Behavioral: Negative for agitation. The patient is not nervous/anxious. Objective  Vital Signs - /74   Pulse 96   Ht 6' (1.829 m)   Wt 212 lb (96.2 kg)   BMI 28.75 kg/m²   Physical Exam  Vitals signs and nursing note reviewed. Constitutional:       General: He is not in acute distress. Appearance: He is well-developed. He is not diaphoretic. Comments: Deconditioned   HENT:      Head: Normocephalic and atraumatic. Right Ear: Hearing and external ear normal.      Left Ear: Hearing and external ear normal.      Nose: Nose normal.   Eyes:      General:         Right eye: No discharge. Left eye: No discharge. Pupils: Pupils are equal, round, and reactive to light. Neck:      Musculoskeletal: Neck supple. No muscular tenderness. Thyroid: No thyromegaly. Vascular: No carotid bruit or JVD. Trachea: No tracheal deviation. Cardiovascular:      Rate and Rhythm: Normal rate and regular rhythm. Heart sounds: Normal heart sounds. No murmur. No friction rub. No gallop. Pulmonary:      Effort: Pulmonary effort is normal. No respiratory distress. Breath sounds: No wheezing or rales. Comments: Breath sounds decreased throughout  On oxygen  Abdominal:      Palpations: Abdomen is soft. Tenderness: There is no abdominal tenderness. Musculoskeletal:         General: No swelling or deformity. Comments: Normal gait and station   Skin:     General: Skin is warm and dry. Findings: No rash. Neurological:      General: No focal deficit present. Mental Status: He is alert and oriented to person, place, and time. Cranial Nerves: No cranial nerve deficit.    Psychiatric:         Mood and Affect: Mood normal.         Behavior: Behavior normal.         Judgment: Judgment normal.         Data:  Echo 3/20  Summary   Limited study patient on ventilator   Single view from subcostal window demonstrated what appears to be normal   left ventricular systolic function and size   Right ventricular size appears to be within normal limits with preserved   systolic function   No further assessment available     Heart Cath 6/5/18  Summary:       1. Successful femoral artery ultrasound  2. Successful femoral artery arterogram  3. Diffuse severe multiple vessel coronary artery disease involving the distal left main, entire LAD, circumflex and RCA vessels  4. Left ventricular function is impaired in the anterior lateral wall with a visually estimated ejection fraction of 40%   5.  99% long diffuse lesion in the mid LAD  6. Successful percutaneous coronary intervention utilizing a two bare metal stents to the mid LAD.     Lab Results   Component Value Date    CHOL 159 (L) 03/12/2020    TRIG 52 03/12/2020    HDL 64 03/12/2020    LDLCALC 85 03/12/2020     Lab Results   Component Value Date    ALT 76 (H) 03/26/2020    AST 81 (H) 03/26/2020     Assessment:     Diagnosis Orders   1. Coronary artery disease of native artery of native heart with stable angina pectoris ([address]     2. Essential hypertension     3. Mixed hyperlipidemia     4. Chronic obstructive pulmonary disease, unspecified COPD type ([address]     5. Arm DVT (deep venous thromboembolism), acute, right (HCC)         CAD- symptoms of stable angina. No episodes in the last month. Increase in isosorbide in the past has caused syncope. Continue to use nitro as needed. Call for increasing frequency of angina symptoms. Hypertension-with tendency toward hypotension. Stable    Hyperlipidemia-on statin therapy. Reviewed recent lipids which are near goal.  Liver enzymes are elevated so likely increase in statin is not warranted. Continue present treatment    COPD-end-stage on oxygen. Follows with pulmonology    Right arm DVT-resolved, now off Eliquis.   Had significant issues with bleeding while on both Eliquis and Effient    Stable cardiovascular status. No evidence of overt heart failure,angina or dysrhythmia. Plan    Return in about 3 months (around 11/25/2020) for Dr. Meg El. Use Nitro as needed  Call for increasing frequency of chest pain    Call with any questionsor concerns  Follow up with Jodee Knowles MD for non cardiac problems  Report any new problems  Cardiovascular Fitness-Exercise as tolerated. Strive for 15 minutes of exercise most days of the week. Cardiac / HealthyDiet  Continue current medications as directed  Continue plan of treatment  It is always recommended that you bring your medicationsbottles with you to each visit - this is for your safety!        CHALO Hatfield

## 2020-10-22 RX ORDER — LISINOPRIL 5 MG/1
5 TABLET ORAL DAILY
Qty: 90 TABLET | Refills: 3 | Status: SHIPPED | OUTPATIENT
Start: 2020-10-22 | End: 2021-09-23

## 2020-10-22 RX ORDER — ISOSORBIDE MONONITRATE 30 MG/1
TABLET, EXTENDED RELEASE ORAL
Qty: 90 TABLET | Refills: 3 | Status: SHIPPED | OUTPATIENT
Start: 2020-10-22 | End: 2021-08-30 | Stop reason: SDUPTHER

## 2021-02-22 ENCOUNTER — OFFICE VISIT (OUTPATIENT)
Dept: CARDIOLOGY CLINIC | Age: 71
End: 2021-02-22
Payer: COMMERCIAL

## 2021-02-22 VITALS
WEIGHT: 220 LBS | HEIGHT: 72 IN | HEART RATE: 94 BPM | SYSTOLIC BLOOD PRESSURE: 110 MMHG | BODY MASS INDEX: 29.8 KG/M2 | OXYGEN SATURATION: 96 % | DIASTOLIC BLOOD PRESSURE: 78 MMHG

## 2021-02-22 DIAGNOSIS — I10 ESSENTIAL HYPERTENSION: ICD-10-CM

## 2021-02-22 DIAGNOSIS — Z79.01 CHRONIC ANTICOAGULATION: ICD-10-CM

## 2021-02-22 DIAGNOSIS — I25.10 CORONARY ARTERY DISEASE INVOLVING NATIVE CORONARY ARTERY OF NATIVE HEART WITHOUT ANGINA PECTORIS: ICD-10-CM

## 2021-02-22 DIAGNOSIS — I20.8 OTHER FORMS OF ANGINA PECTORIS: ICD-10-CM

## 2021-02-22 DIAGNOSIS — Z95.5 H/O HEART ARTERY STENT: ICD-10-CM

## 2021-02-22 DIAGNOSIS — E78.2 MIXED HYPERLIPIDEMIA: ICD-10-CM

## 2021-02-22 DIAGNOSIS — I20.8 OTHER FORMS OF ANGINA PECTORIS (HCC): ICD-10-CM

## 2021-02-22 DIAGNOSIS — Z79.01 CHRONIC ANTICOAGULATION: Primary | ICD-10-CM

## 2021-02-22 LAB
ANION GAP SERPL CALCULATED.3IONS-SCNC: 12 MMOL/L (ref 7–19)
BUN BLDV-MCNC: 21 MG/DL (ref 8–23)
CALCIUM SERPL-MCNC: 9.2 MG/DL (ref 8.8–10.2)
CHLORIDE BLD-SCNC: 95 MMOL/L (ref 98–111)
CHOLESTEROL, TOTAL: 173 MG/DL (ref 160–199)
CO2: 27 MMOL/L (ref 22–29)
CREAT SERPL-MCNC: 1.1 MG/DL (ref 0.5–1.2)
GFR AFRICAN AMERICAN: >59
GFR NON-AFRICAN AMERICAN: >60
GLUCOSE BLD-MCNC: 110 MG/DL (ref 74–109)
HDLC SERPL-MCNC: 43 MG/DL (ref 55–121)
LDL CHOLESTEROL CALCULATED: 109 MG/DL
POTASSIUM SERPL-SCNC: 4.9 MMOL/L (ref 3.5–5)
SODIUM BLD-SCNC: 134 MMOL/L (ref 136–145)
TRIGL SERPL-MCNC: 106 MG/DL (ref 0–149)

## 2021-02-22 PROCEDURE — 99214 OFFICE O/P EST MOD 30 MIN: CPT | Performed by: INTERNAL MEDICINE

## 2021-02-22 ASSESSMENT — ENCOUNTER SYMPTOMS
SHORTNESS OF BREATH: 0
GASTROINTESTINAL NEGATIVE: 1
DIARRHEA: 0
EYES NEGATIVE: 1
NAUSEA: 0
RESPIRATORY NEGATIVE: 1
VOMITING: 0

## 2021-02-22 NOTE — PROGRESS NOTES
[facility] Progress Note                            Date:  2/22/2021  Patient: Kam All  Age:  78 y.o., 1950      Reason for evaluation:         SUBJECTIVE:    Returns today follow-up assessment. Has occasional chest pain has taken 4 nitroglycerin tablets in the past 30 days nothing severe overall pattern of chest discomfort stable and unchanged. Exertional dyspnea like lies about the same. No other issues reported. Blood pressure 110/78 heart 94. He quit smoking a while back. No recent lipid profile on file and he has not eaten today. Review of Systems   Constitutional: Negative. Negative for chills, fever and unexpected weight change. HENT: Negative. Eyes: Negative. Respiratory: Negative. Negative for shortness of breath. Cardiovascular: Negative. Negative for chest pain. Gastrointestinal: Negative. Negative for diarrhea, nausea and vomiting. Endocrine: Negative. Genitourinary: Negative. Musculoskeletal: Negative. Skin: Negative. Neurological: Negative. All other systems reviewed and are negative. OBJECTIVE:     /78   Pulse 94   Ht 6' (1.829 m)   Wt 220 lb (99.8 kg)   SpO2 96%   BMI 29.84 kg/m²     Labs:   CBC: No results for input(s): WBC, HGB, HCT, PLT in the last 72 hours. BMP:No results for input(s): NA, K, CO2, BUN, CREATININE, LABGLOM, GLUCOSE in the last 72 hours. BNP: No results for input(s): BNP in the last 72 hours. PT/INR: No results for input(s): PROTIME, INR in the last 72 hours. APTT:No results for input(s): APTT in the last 72 hours. CARDIAC ENZYMES:No results for input(s): CKTOTAL, CKMB, CKMBINDEX, TROPONINI in the last 72 hours. FASTING LIPID PANEL:  Lab Results   Component Value Date    HDL 64 03/12/2020    LDLCALC 85 03/12/2020    TRIG 52 03/12/2020     LIVER PROFILE:No results for input(s): AST, ALT, LABALBU in the last 72 hours.         Past Medical History:   Diagnosis Date    CAD (coronary artery MG/3ML SOLN nebulizer solution Inhale 3 mLs into the lungs every 4 hours (while awake) 360 mL 2    famotidine (PEPCID) 20 MG tablet Take 20 mg by mouth 2 times daily      ferrous sulfate (IRON 325) 325 (65 Fe) MG tablet Take 325 mg by mouth daily (with breakfast)       No current facility-administered medications for this visit.       Social History     Socioeconomic History    Marital status:      Spouse name: Stacie Zaldivar Number of children: 3    Years of education: 8    Highest education level: Not on file   Occupational History    Not on file   Social Needs    Financial resource strain: Not on file    Food insecurity     Worry: Not on file     Inability: Not on file    Transportation needs     Medical: Not on file     Non-medical: Not on file   Tobacco Use    Smoking status: Former Smoker     Packs/day: 1.00     Years: 50.00     Pack years: 50.00     Types: Cigarettes     Quit date: 2018     Years since quittin.7    Smokeless tobacco: Current User     Types: Chew   Substance and Sexual Activity    Alcohol use: Not Currently     Frequency: Never     Comment: occassional    Drug use: No    Sexual activity: Not on file   Lifestyle    Physical activity     Days per week: Not on file     Minutes per session: Not on file    Stress: Not on file   Relationships    Social connections     Talks on phone: Not on file     Gets together: Not on file     Attends [employer]ism service: Not on file     Active member of club or organization: Not on file     Attends meetings of clubs or organizations: Not on file     Relationship status: Not on file    Intimate partner violence     Fear of current or ex partner: Not on file     Emotionally abused: Not on file     Physically abused: Not on file     Forced sexual activity: Not on file   Other Topics Concern    Not on file   Social History Narrative     50 years only marriage    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 Constitutional:       Appearance: He is well-developed. Neck:      Vascular: No carotid bruit or JVD. Cardiovascular:      Rate and Rhythm: Normal rate and regular rhythm. Heart sounds: Normal heart sounds. No murmur. No friction rub. No gallop. Pulmonary:      Effort: Pulmonary effort is normal. No respiratory distress. Breath sounds: Normal breath sounds. No wheezing or rales. Abdominal:      General: There is no distension. Tenderness: There is no abdominal tenderness. Lymphadenopathy:      Cervical: No cervical adenopathy. Skin:     General: Skin is warm and dry. ASSESSMENT:     Diagnosis Orders   1. Chronic anticoagulation  Lipid Panel    Basic Metabolic Panel   2. Mixed hyperlipidemia  Lipid Panel    Basic Metabolic Panel   3. Other forms of angina pectoris ([address]  Lipid Panel    Basic Metabolic Panel   4. Coronary artery disease involving native coronary artery of native heart without angina pectoris  Lipid Panel    Basic Metabolic Panel   5. Essential hypertension  Lipid Panel    Basic Metabolic Panel   6. H/O heart artery stent  Lipid Panel    Basic Metabolic Panel       PLAN:  Orders Placed This Encounter   Procedures    Lipid Panel    Basic Metabolic Panel     No orders of the defined types were placed in this encounter. 1. Continue present medications  2. Laboratory studies as ordered  3. Recommend follow-up assessment in 6 months    Return in about 6 months (around 8/22/2021) for return to Dr. Joe escobar.       Edith Cordova MD 2/22/2021 2:43 PM [facility]      Thisdictation was generated by voice recognition computer software. Although all attempts are made to edit the dictation for accuracy, there may be errors in the transcription that are not intended.

## 2021-04-26 RX ORDER — METOPROLOL SUCCINATE 25 MG/1
25 TABLET, EXTENDED RELEASE ORAL DAILY
Qty: 90 TABLET | Refills: 3 | Status: SHIPPED | OUTPATIENT
Start: 2021-04-26 | End: 2022-01-20 | Stop reason: SDUPTHER

## 2021-06-23 ENCOUNTER — TELEPHONE (OUTPATIENT)
Dept: CARDIOLOGY CLINIC | Age: 71
End: 2021-06-23

## 2021-06-23 NOTE — TELEPHONE ENCOUNTER
Date: TBD    Cardiologist: Luma    Procedure: cataract surgery    Surgeon: Chip Elise    Last Office Visit: 2/22/21  Reason for office visit and medical concerns addressed at this office visit: cad, chf, dvt, copd, htn, hyperlipidemia,     Testing Performed and Date of Service:  3/12/20 Echo  Limited study patient on ventilator   Single view from subcostal window demonstrated what appears to be normal   left ventricular systolic function and size   Right ventricular size appears to be within normal limits with preserved   systolic function   No further assessment available    RCRI = 6.6   METs 4    Current Medications: metoprolol, lisinopril, imdur, prednisone, pepcid, iron, potassium, atorvastatin, flomax, mucinex, effient, lasix, aspirin    Is the patient currently taking an anticoagulant?  If so, what is the diagnosis the patient has been given to warrant the need for the anticoagulant? effient    Additional Notes: requesting cardiac clearance prior to procedure

## 2021-06-23 NOTE — TELEPHONE ENCOUNTER
Cortez with [facility] was calling on behalf of Tatyana Cobb, pcp for patient. He wanted to get Dr. Ge's opinion on use of effient and asa 81 mg. PCP has been prescribing the effient for patient and wants to know if he needs to be on both medication. Please advise.  Office number [phone]

## 2021-06-24 NOTE — TELEPHONE ENCOUNTER
He has been on both for a while now. So it is fine to continue both unless there is some issue that would be of concern. Talked with Yoan Dinero.

## 2021-08-26 ENCOUNTER — OFFICE VISIT (OUTPATIENT)
Dept: CARDIOLOGY CLINIC | Age: 71
End: 2021-08-26
Payer: COMMERCIAL

## 2021-08-26 VITALS
SYSTOLIC BLOOD PRESSURE: 110 MMHG | WEIGHT: 230 LBS | HEART RATE: 72 BPM | DIASTOLIC BLOOD PRESSURE: 70 MMHG | HEIGHT: 72 IN | BODY MASS INDEX: 31.15 KG/M2

## 2021-08-26 DIAGNOSIS — E78.2 MIXED HYPERLIPIDEMIA: ICD-10-CM

## 2021-08-26 DIAGNOSIS — I10 ESSENTIAL HYPERTENSION: ICD-10-CM

## 2021-08-26 DIAGNOSIS — I20.8 OTHER FORMS OF ANGINA PECTORIS (HCC): ICD-10-CM

## 2021-08-26 DIAGNOSIS — R55 SYNCOPE AND COLLAPSE: Primary | ICD-10-CM

## 2021-08-26 DIAGNOSIS — Z95.5 H/O HEART ARTERY STENT: ICD-10-CM

## 2021-08-26 DIAGNOSIS — I25.10 CORONARY ARTERY DISEASE INVOLVING NATIVE CORONARY ARTERY OF NATIVE HEART WITHOUT ANGINA PECTORIS: ICD-10-CM

## 2021-08-26 DIAGNOSIS — Z79.01 CHRONIC ANTICOAGULATION: ICD-10-CM

## 2021-08-26 PROCEDURE — 99214 OFFICE O/P EST MOD 30 MIN: CPT | Performed by: INTERNAL MEDICINE

## 2021-08-26 ASSESSMENT — ENCOUNTER SYMPTOMS
EYES NEGATIVE: 1
DIARRHEA: 0
RESPIRATORY NEGATIVE: 1
SHORTNESS OF BREATH: 0
VOMITING: 0
GASTROINTESTINAL NEGATIVE: 1
NAUSEA: 0

## 2021-08-26 NOTE — PROGRESS NOTES
[facility] Progress Note                            Date:  8/26/2021  Patient: Dionicia Frankel  Age:  73 y.o., 1950      Reason for evaluation:         SUBJECTIVE:    Returns today follow-up assessment follow-up for ischemic heart disease previous stents STEMI in June 2018. He continues to have both sharp chest pains lasting seconds in his left chest usually associated with coughing and intermittent pressure in the middle of his chest that occurs perhaps every 2 weeks usually relieved by nitroglycerin he is taken about 6 in the last 30 days nothing unusually severe or prolonged. Gets tired and fatigued all the time. Unable to walk or exercise. No recent stress test on file. Discussed with the patient regarding whether or not to proceed with a follow-up stress test he was not interested in this at this time. Review of Systems   Constitutional: Negative. Negative for chills, fever and unexpected weight change. HENT: Negative. Eyes: Negative. Respiratory: Negative. Negative for shortness of breath. Cardiovascular: Negative. Negative for chest pain. Gastrointestinal: Negative. Negative for diarrhea, nausea and vomiting. Endocrine: Negative. Genitourinary: Negative. Musculoskeletal: Negative. Skin: Negative. Neurological: Negative. All other systems reviewed and are negative. OBJECTIVE:     /70   Pulse 72   Ht 6' (1.829 m)   Wt 230 lb (104.3 kg)   BMI 31.19 kg/m²     Labs:   CBC: No results for input(s): WBC, HGB, HCT, PLT in the last 72 hours. BMP:No results for input(s): NA, K, CO2, BUN, CREATININE, LABGLOM, GLUCOSE in the last 72 hours. BNP: No results for input(s): BNP in the last 72 hours. PT/INR: No results for input(s): PROTIME, INR in the last 72 hours. APTT:No results for input(s): APTT in the last 72 hours. CARDIAC ENZYMES:No results for input(s): CKTOTAL, CKMB, CKMBINDEX, TROPONINI in the last 72 hours.   FASTING LIPID PANEL:  Lab Results   Component Value Date    HDL 43 02/22/2021    LDLCALC 109 02/22/2021    TRIG 106 02/22/2021     LIVER PROFILE:No results for input(s): AST, ALT, LABALBU in the last 72 hours.         Past Medical History:   Diagnosis Date    CAD (coronary artery disease)     Chest pain 11/14/2018    CHF (congestive heart failure) (HCC)     COPD (chronic obstructive pulmonary disease) (HCC)     Hyperlipidemia     Hypertension     Palliative care patient 03/12/2020    Pneumonia      Past Surgical History:   Procedure Laterality Date    APPENDECTOMY      BACK SURGERY      COLONOSCOPY      ENDOSCOPY, COLON, DIAGNOSTIC       Family History   Problem Relation Age of Onset    Cancer Sister     Cancer Brother      Allergies   Allergen Reactions    Diphenhydramine Itching     Current Outpatient Medications   Medication Sig Dispense Refill    metoprolol succinate (TOPROL XL) 25 MG extended release tablet TAKE 1 TABLET BY MOUTH DAILY 90 tablet 3    lisinopril (PRINIVIL;ZESTRIL) 5 MG tablet Take 1 tablet by mouth daily 90 tablet 3    isosorbide mononitrate (IMDUR) 30 MG extended release tablet TAKE ONE TABLET BY MOUTH ONCE A DAY *DO NOT TAKE IF SYSTOLIC BLOOD PRESSURE IS LESS THAN 100* 90 tablet 3    predniSONE (DELTASONE) 10 MG tablet prednisone 10 mg tablet      fluticasone-umeclidin-vilant (TRELEGY ELLIPTA) 100-62.5-25 MCG/INH AEPB Inhale 1 puff into the lungs daily      famotidine (PEPCID) 20 MG tablet Take 20 mg by mouth 2 times daily      ferrous sulfate (IRON 325) 325 (65 Fe) MG tablet Take 325 mg by mouth daily (with breakfast)      potassium chloride (MICRO-K) 10 MEQ extended release capsule Take 10 mEq by mouth 2 times daily      atorvastatin (LIPITOR) 80 MG tablet TAKE 1 TABLET BY MOUTH ONCE DAILY 30 tablet 0    tamsulosin (FLOMAX) 0.4 MG capsule Take 0.4 mg by mouth daily      prasugrel (EFFIENT) 10 MG TABS TAKE 1 TABLET BY MOUTH DAILY 30 tablet 0    furosemide (LASIX) 20 MG tablet Take 1 tablet by mouth daily as needed (weight gain of 3 lbs overnight or 5 pounds in a week.) (Patient taking differently: Take 20 mg by mouth daily ) 90 tablet 1    nitroGLYCERIN (NITROSTAT) 0.4 MG SL tablet Place 1 tablet under the tongue every 5 minutes as needed for Chest pain 25 tablet 3    aspirin 81 MG chewable tablet Take 1 tablet by mouth daily 30 tablet 3    ipratropium-albuterol (DUONEB) 0.5-2.5 (3) MG/3ML SOLN nebulizer solution Inhale 3 mLs into the lungs every 4 hours (while awake) 360 mL 2     No current facility-administered medications for this visit. Social History     Socioeconomic History    Marital status:      Spouse name: Viktoria Kinney Number of children: 3    Years of education: 8    Highest education level: Not on file   Occupational History    Not on file   Tobacco Use    Smoking status: Former Smoker     Packs/day: 1.00     Years: 50.00     Pack years: 50.00     Types: Cigarettes     Quit date: 6/5/2018     Years since quitting: 3.2    Smokeless tobacco: Current User     Types: Chew   Vaping Use    Vaping Use: Never used   Substance and Sexual Activity    Alcohol use: Not Currently     Comment: occassional    Drug use: No    Sexual activity: Not on file   Other Topics Concern    Not on file   Social History Narrative     50 years only marriage    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     Never in the [employer] high school    Walks with the assistance of a cane and a walker    Drives infrequently    Smoked 3 to 4 packs/day quit 2 years ago denies alcohol consumption or substance usage    Physically sedentary     Social Determinants of Health     Financial Resource Strain:     Difficulty of Paying Living Expenses:    Food Insecurity:     Worried About Running Out of Food in the Last Year:     [address] in the Last Year:    Transportation Needs:     Lack of Transportation (Medical):      Lack of Transportation (Non-Medical): (around 11/26/2021) for return to Dr. Ward April only. Lorenzo Gibson MD 8/26/2021 10:50 AM CDT    [facility]      Thisdictation was generated by voice recognition computer software. Although all attempts are made to edit the dictation for accuracy, there may be errors in the transcription that are not intended.

## 2021-08-30 RX ORDER — ISOSORBIDE MONONITRATE 60 MG/1
60 TABLET, EXTENDED RELEASE ORAL DAILY
Qty: 90 TABLET | Refills: 3 | Status: SHIPPED | OUTPATIENT
Start: 2021-08-30 | End: 2022-01-20 | Stop reason: SDUPTHER

## 2021-09-24 RX ORDER — LISINOPRIL 5 MG/1
TABLET ORAL
Qty: 90 TABLET | Refills: 3 | Status: SHIPPED | OUTPATIENT
Start: 2021-09-24 | End: 2022-08-19 | Stop reason: SDUPTHER

## 2021-11-29 ENCOUNTER — OFFICE VISIT (OUTPATIENT)
Dept: CARDIOLOGY CLINIC | Age: 71
End: 2021-11-29
Payer: MEDICARE

## 2021-11-29 VITALS
OXYGEN SATURATION: 99 % | DIASTOLIC BLOOD PRESSURE: 54 MMHG | HEIGHT: 72 IN | WEIGHT: 239 LBS | SYSTOLIC BLOOD PRESSURE: 100 MMHG | BODY MASS INDEX: 32.37 KG/M2 | HEART RATE: 118 BPM

## 2021-11-29 DIAGNOSIS — I10 ESSENTIAL HYPERTENSION: ICD-10-CM

## 2021-11-29 DIAGNOSIS — I25.10 CORONARY ARTERY DISEASE INVOLVING NATIVE CORONARY ARTERY OF NATIVE HEART WITHOUT ANGINA PECTORIS: Primary | ICD-10-CM

## 2021-11-29 DIAGNOSIS — E78.5 HYPERLIPIDEMIA, UNSPECIFIED HYPERLIPIDEMIA TYPE: ICD-10-CM

## 2021-11-29 PROCEDURE — 99214 OFFICE O/P EST MOD 30 MIN: CPT | Performed by: NURSE PRACTITIONER

## 2021-11-29 PROCEDURE — 93000 ELECTROCARDIOGRAM COMPLETE: CPT | Performed by: NURSE PRACTITIONER

## 2021-11-29 RX ORDER — FUROSEMIDE 20 MG/1
20 TABLET ORAL DAILY
Qty: 90 TABLET | Refills: 3 | Status: SHIPPED | OUTPATIENT
Start: 2021-11-29 | End: 2022-10-24

## 2021-11-29 RX ORDER — CLOPIDOGREL BISULFATE 75 MG/1
75 TABLET ORAL DAILY
Qty: 90 TABLET | Refills: 3 | Status: SHIPPED | OUTPATIENT
Start: 2021-11-29 | End: 2022-10-24

## 2021-11-29 ASSESSMENT — ENCOUNTER SYMPTOMS
WHEEZING: 0
SHORTNESS OF BREATH: 1
CHEST TIGHTNESS: 0
SORE THROAT: 0
COUGH: 0

## 2021-11-29 NOTE — PROGRESS NOTES
[id number] [facility]   Established Patient Office Visit   [place] [facility]. [address]  [phone]        OFFICE VISIT:  2021    Karlo Larios - : 1950    Reason For Visit:  Priscilla Rhoades is a 73 y.o. male who is here for 3 Month Follow-Up (No cardiac symptoms. Needs new rx for something besides Effient due to insurance coverage.)    1. Coronary artery disease involving native coronary artery of native heart without angina pectoris    2. Essential hypertension    3. Hyperlipidemia, unspecified hyperlipidemia type        Patient with a history of coronary artery disease, hypertension, hyperlipidemia and syncope. He is a patient of Dr. Frances Romero. Patient presents to clinic today for 3-month follow-up. Patient was last seen in the office on 2021 and Imdur 60 mg was recommended. States since the Imdur was increased to 60 mg a day he is no longer experiencing any chest pain. He still has shortness of breath which is his baseline. He is on O2 2 L. There is no orthopnea or PND. Patient denies any lightheadedness, dizziness or syncope.       Subjective    Karlo Larios is a 73 y.o. male with the following history as recorded in [facility]:    Patient Active Problem List    Diagnosis Date Noted    H/O heart artery stent 2021    Arm DVT (deep venous thromboembolism), acute, right ([address] 2020    Chronic anticoagulation 2020    Mixed hyperlipidemia 2020    History of myocardial infarction 2020    History of coronary artery stent placement 2020    Stable angina ([address] 2020    Epistaxis 2020    Palliative care patient 2020    NSTEMI (non-ST elevated myocardial infarction) ([address] 2020    Chronic obstructive pulmonary disease ([address] 2019    Essential hypertension 2019    Syncope and collapse 2019    Chest pain 2018    Coronary artery disease involving native coronary artery of native heart 06/05/2018    STEMI (ST elevation myocardial infarction) ([facility] 06/05/2018    Acute ST elevation myocardial infarction (STEMI) involving left anterior descending (LAD) coronary artery (HCC)     Electronic cigarette use      Current Outpatient Medications   Medication Sig Dispense Refill    furosemide (LASIX) 20 MG tablet Take 1 tablet by mouth daily 90 tablet 3    clopidogrel (PLAVIX) 75 MG tablet Take 1 tablet by mouth daily 90 tablet 3    lisinopril (PRINIVIL;ZESTRIL) 5 MG tablet TAKE ONE TABLET BY MOUTH ONCE DAILY 90 tablet 3    isosorbide mononitrate (IMDUR) 60 MG extended release tablet Take 1 tablet by mouth daily TAKE ONE TABLET BY MOUTH ONCE A DAY *DO NOT TAKE IF SYSTOLIC BLOOD PRESSURE IS LESS THAN 100* 90 tablet 3    metoprolol succinate (TOPROL XL) 25 MG extended release tablet TAKE 1 TABLET BY MOUTH DAILY 90 tablet 3    predniSONE (DELTASONE) 10 MG tablet prednisone 10 mg tablet      fluticasone-umeclidin-vilant (TRELEGY ELLIPTA) 100-62.5-25 MCG/INH AEPB Inhale 1 puff into the lungs daily      famotidine (PEPCID) 20 MG tablet Take 20 mg by mouth 2 times daily      ferrous sulfate (IRON 325) 325 (65 Fe) MG tablet Take 325 mg by mouth daily (with breakfast)      potassium chloride (MICRO-K) 10 MEQ extended release capsule Take 10 mEq by mouth 2 times daily      atorvastatin (LIPITOR) 80 MG tablet TAKE 1 TABLET BY MOUTH ONCE DAILY 30 tablet 0    tamsulosin (FLOMAX) 0.4 MG capsule Take 0.4 mg by mouth daily      nitroGLYCERIN (NITROSTAT) 0.4 MG SL tablet Place 1 tablet under the tongue every 5 minutes as needed for Chest pain 25 tablet 3    aspirin 81 MG chewable tablet Take 1 tablet by mouth daily 30 tablet 3    ipratropium-albuterol (DUONEB) 0.5-2.5 (3) MG/3ML SOLN nebulizer solution Inhale 3 mLs into the lungs every 4 hours (while awake) 360 mL 2     No current facility-administered medications for this visit.      Allergies: Diphenhydramine  Past Medical History:   Diagnosis Date    CAD (coronary artery disease)     Chest pain 11/14/2018    CHF (congestive heart failure) (HCC)     COPD (chronic obstructive pulmonary disease) (HCC)     Hyperlipidemia     Hypertension     Palliative care patient 03/12/2020    Pneumonia      Past Surgical History:   Procedure Laterality Date    APPENDECTOMY      BACK SURGERY      COLONOSCOPY      ENDOSCOPY, COLON, DIAGNOSTIC       Family History   Problem Relation Age of Onset    Cancer Sister     Cancer Brother      Social History     Tobacco Use    Smoking status: Former Smoker     Packs/day: 1.00     Years: 50.00     Pack years: 50.00     Types: Cigarettes     Quit date: 6/5/2018     Years since quitting: 3.4    Smokeless tobacco: Current User     Types: Chew   Substance Use Topics    Alcohol use: Not Currently     Comment: occassional          Review of Systems:    Review of Systems   Constitutional: Negative for activity change, chills, diaphoresis, fatigue and fever. HENT: Negative for congestion and sore throat. Respiratory: Positive for shortness of breath (Baseline no worsening). Negative for cough, chest tightness and wheezing. Cardiovascular: Negative for chest pain, palpitations and leg swelling. Neurological: Negative for dizziness, syncope, light-headedness and headaches. Psychiatric/Behavioral: Negative for confusion. The patient is not nervous/anxious. Objective:    BP (!) 100/54   Pulse 118   Ht 6' (1.829 m)   Wt 239 lb (108.4 kg)   SpO2 99%   BMI 32.41 kg/m²     GENERAL - well developed and well nourished, conversant  HEENT -  PERRLA, Hearing appears normal, gentleman lids are normal.  External inspection of ears and nose appear normal.  NECK - no thyromegaly, no JVD, trachea is in the midline  CARDIOVASCULAR - PMI is in the mid line clavicular position, Normal S1 and S2 with no systolic murmur. No S3 or S4    PULMONARY - no respiratory distress. No wheezes or rales.  Lungs are clear to ausculation, normal respiratory effort. ABDOMEN  - soft, non tender, no rebound  MUSCULOSKELETAL  - range of motion of the upper and lower extermites appears normal and equal and is without pain   EXTREMITIES - no significant edema   NEUROLOGIC - gait and station are normal  SKIN - turgor is normal, can warm and dry. PSYCHIATRIC - normal mood and affect, alert and orientated x 3,      ASSESSMENT:    ALL THE CARDIOLOGY PROBLEMS ARE LISTED ABOVE; HOWEVER, THE FOLLOWING SPECIFIC CARDIAC PROBLEMS / CONDITIONS WERE ADDRESSED AND TREATED DURING THE OFFICE VISIT TODAY:                                                                                            MEDICAL DECISION MAKING             Cardiac Specific Problem / Diagnosis  Discussion and Data Reviewed Diagnostic Procedures Ordered Management Options Selected           1. CAD  Stable   Review and summation of old records:    No Chest pain. Patient is on aspirin, Lipitor, Imdur, Toprol. Patient is on Effient. Patient states insurance company will no longer cover his Effient and since he has an inoperable CT candidate we will switch over to Plavix 75 mg daily. No Continue current medications:     Yes           2. Hypertension  Stable   Blood pressure in the office today 100/54. O2 sat 99%. Patient is on lisinopril 5 mg daily. Toprol-XL 25 mg daily. No Continue current medications:    Yes           3. Hyperlipidemia Stable  patient is on Lipitor 80 mg daily. No Continue current medications: Yes                     Orders Placed This Encounter   Procedures    EKG 12 lead     Order Specific Question:   Reason for Exam?     Answer: Other     Orders Placed This Encounter   Medications    furosemide (LASIX) 20 MG tablet     Sig: Take 1 tablet by mouth daily     Dispense:  90 tablet     Refill:  3    clopidogrel (PLAVIX) 75 MG tablet     Sig: Take 1 tablet by mouth daily     Dispense:  90 tablet     Refill:  3       Discussed with patient and spouse.     Return in about 6 months (around 5/29/2022) for Dr Maribell Parry. I greatly appreciate the opportunity to meet Ketan Lynn and your confidence in allowing me to participate in his cardiovascular care. CHALO Corona NP  11/29/2021 3:01 PM CST                    This dictation was generated by voice recognition computer software. Although all attempts are made to edit dictation for accuracy, there may be errors in the transcription that are not intended.

## 2022-01-20 RX ORDER — METOPROLOL SUCCINATE 25 MG/1
25 TABLET, EXTENDED RELEASE ORAL DAILY
Qty: 90 TABLET | Refills: 3 | Status: SHIPPED | OUTPATIENT
Start: 2022-01-20

## 2022-01-20 RX ORDER — ISOSORBIDE MONONITRATE 60 MG/1
60 TABLET, EXTENDED RELEASE ORAL DAILY
Qty: 90 TABLET | Refills: 3 | Status: SHIPPED | OUTPATIENT
Start: 2022-01-20

## 2022-06-02 ENCOUNTER — OFFICE VISIT (OUTPATIENT)
Dept: CARDIOLOGY CLINIC | Age: 72
End: 2022-06-02
Payer: COMMERCIAL

## 2022-06-02 VITALS
SYSTOLIC BLOOD PRESSURE: 98 MMHG | HEIGHT: 72 IN | DIASTOLIC BLOOD PRESSURE: 70 MMHG | HEART RATE: 78 BPM | BODY MASS INDEX: 29.8 KG/M2 | WEIGHT: 220 LBS

## 2022-06-02 DIAGNOSIS — Z95.5 H/O HEART ARTERY STENT: ICD-10-CM

## 2022-06-02 DIAGNOSIS — E78.2 MIXED HYPERLIPIDEMIA: Primary | ICD-10-CM

## 2022-06-02 DIAGNOSIS — Z79.01 CHRONIC ANTICOAGULATION: ICD-10-CM

## 2022-06-02 DIAGNOSIS — R06.02 SHORTNESS OF BREATH: ICD-10-CM

## 2022-06-02 DIAGNOSIS — I25.10 CORONARY ARTERY DISEASE INVOLVING NATIVE CORONARY ARTERY OF NATIVE HEART WITHOUT ANGINA PECTORIS: ICD-10-CM

## 2022-06-02 DIAGNOSIS — I10 ESSENTIAL HYPERTENSION: ICD-10-CM

## 2022-06-02 DIAGNOSIS — R55 SYNCOPE AND COLLAPSE: ICD-10-CM

## 2022-06-02 PROCEDURE — 99214 OFFICE O/P EST MOD 30 MIN: CPT | Performed by: INTERNAL MEDICINE

## 2022-06-02 PROCEDURE — 1123F ACP DISCUSS/DSCN MKR DOCD: CPT | Performed by: INTERNAL MEDICINE

## 2022-06-02 ASSESSMENT — ENCOUNTER SYMPTOMS
SHORTNESS OF BREATH: 0
EYES NEGATIVE: 1
DIARRHEA: 0
VOMITING: 0
GASTROINTESTINAL NEGATIVE: 1
NAUSEA: 0
RESPIRATORY NEGATIVE: 1

## 2022-06-02 NOTE — PROGRESS NOTES
[facility]                            Date:  6/2/2022  Patient: Yossi Garcia  Age:  81 y.o., 1950      Reason for evaluation:         SUBJECTIVE:    Returns today follow-up assessment follow-up for coronary artery disease previous stent chronic anticoagulation hyperlipidemia hypertension overall not feeling so well no recent anginal chest pain but he gets out of breath quite readily. History of longstanding tobacco abuse in the past and I suspect severe COPD is on chronic O2 24 hours daily. No recent cardiac testing. No other complaints or issues reported. 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 Blood pressure 98/70 heart 78. Review of Systems   Constitutional: Negative. Negative for chills, fever and unexpected weight change. HENT: Negative. Eyes: Negative. Respiratory: Negative. Negative for shortness of breath. Cardiovascular: Negative. Negative for chest pain. Gastrointestinal: Negative. Negative for diarrhea, nausea and vomiting. Endocrine: Negative. Genitourinary: Negative. Musculoskeletal: Negative. Skin: Negative. Neurological: Negative. All other systems reviewed and are negative. OBJECTIVE:     BP 98/70   Pulse 78   Ht 6' (1.829 m)   Wt 220 lb (99.8 kg)   BMI 29.84 kg/m²     Labs:   CBC: No results for input(s): WBC, HGB, HCT, PLT in the last 72 hours. BMP:No results for input(s): NA, K, CO2, BUN, CREATININE, LABGLOM, GLUCOSE in the last 72 hours. BNP: No results for input(s): BNP in the last 72 hours. PT/INR: No results for input(s): PROTIME, INR in the last 72 hours. APTT:No results for input(s): APTT in the last 72 hours. CARDIAC ENZYMES:No results for input(s): CKTOTAL, CKMB, CKMBINDEX, TROPONINI in the last 72 hours.   FASTING LIPID PANEL:  Lab Results   Component Value Date    HDL 43 02/22/2021    LDLCALC 109 02/22/2021    TRIG 106 02/22/2021     LIVER PROFILE:No results for input(s): AST, ALT, LABALBU in the last 72 hours.         Past Medical History:   Diagnosis Date    CAD (coronary artery disease)     Chest pain 11/14/2018    CHF (congestive heart failure) (HCC)     COPD (chronic obstructive pulmonary disease) (HCC)     Hyperlipidemia     Hypertension     Palliative care patient 03/12/2020    Pneumonia      Past Surgical History:   Procedure Laterality Date    APPENDECTOMY      BACK SURGERY      COLONOSCOPY      ENDOSCOPY, COLON, DIAGNOSTIC       Family History   Problem Relation Age of Onset    Cancer Sister     Cancer Brother      Allergies   Allergen Reactions    Diphenhydramine Itching     Current Outpatient Medications   Medication Sig Dispense Refill    isosorbide mononitrate (IMDUR) 60 MG extended release tablet Take 1 tablet by mouth daily TAKE ONE TABLET BY MOUTH ONCE A DAY *DO NOT TAKE IF SYSTOLIC BLOOD PRESSURE IS LESS THAN 100* 90 tablet 3    metoprolol succinate (TOPROL XL) 25 MG extended release tablet Take 1 tablet by mouth daily 90 tablet 3    furosemide (LASIX) 20 MG tablet Take 1 tablet by mouth daily 90 tablet 3    clopidogrel (PLAVIX) 75 MG tablet Take 1 tablet by mouth daily 90 tablet 3    lisinopril (PRINIVIL;ZESTRIL) 5 MG tablet TAKE ONE TABLET BY MOUTH ONCE DAILY 90 tablet 3    predniSONE (DELTASONE) 10 MG tablet prednisone 10 mg tablet      fluticasone-umeclidin-vilant (TRELEGY ELLIPTA) 100-62.5-25 MCG/INH AEPB Inhale 1 puff into the lungs daily      famotidine (PEPCID) 20 MG tablet Take 20 mg by mouth 2 times daily      ferrous sulfate (IRON 325) 325 (65 Fe) MG tablet Take 325 mg by mouth daily (with breakfast)      potassium chloride (MICRO-K) 10 MEQ extended release capsule Take 10 mEq by mouth 2 times daily      atorvastatin (LIPITOR) 80 MG tablet TAKE 1 TABLET BY MOUTH ONCE DAILY 30 tablet 0    tamsulosin (FLOMAX) 0.4 MG capsule Take 0.4 mg by mouth daily      nitroGLYCERIN (NITROSTAT) 0.4 MG SL tablet Place 1 tablet under the tongue every 5 minutes as needed for Chest pain 25 tablet 3    aspirin 81 MG chewable tablet Take 1 tablet by mouth daily 30 tablet 3    ipratropium-albuterol (DUONEB) 0.5-2.5 (3) MG/3ML SOLN nebulizer solution Inhale 3 mLs into the lungs every 4 hours (while awake) 360 mL 2     No current facility-administered medications for this visit. Social History     Socioeconomic History    Marital status:      Spouse name: Vickei Aquino Number of children: 3    Years of education: 8    Highest education level: Not on file   Occupational History    Not on file   Tobacco Use    Smoking status: Former Smoker     Packs/day: 1.00     Years: 50.00     Pack years: 50.00     Types: Cigarettes     Quit date: 6/5/2018     Years since quitting: 3.9    Smokeless tobacco: Current User     Types: Chew   Vaping Use    Vaping Use: Never used   Substance and Sexual Activity    Alcohol use: Not Currently     Comment: occassional    Drug use: No    Sexual activity: Not on file   Other Topics Concern    Not on file   Social History Narrative     50 years only marriage    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     Never in the [employer] high school    Walks with the assistance of a cane and a walker    Drives infrequently    Smoked 3 to 4 packs/day quit 2 years ago denies alcohol consumption or substance usage    Physically sedentary     Social Determinants of Health     Financial Resource Strain:     Difficulty of Paying Living Expenses: Not on file   Food Insecurity:     Worried About [address] in the Last Year: Not on file    Marvin of Food in the Last Year: Not on file   Transportation Needs:     Lack of Transportation (Medical): Not on file    Lack of Transportation (Non-Medical):  Not on file   Physical Activity:     Days of Exercise per Week: Not on file    Minutes of Exercise per Session: Not on file   Stress:     Feeling of Stress : Not on file   Social Connections:     Frequency of Communication with Friends and Family: Not on file    Frequency of Social Gatherings with Friends and Family: Not on file    Attends Hindu Services: Not on file    Active Member of [address] [place] [address] Vacation Listing Service or Organizations: Not on file    Attends Club or Organization Meetings: Not on file    Marital Status: Not on file   Intimate Partner Violence:     Fear of Current or Ex-Partner: Not on file    Emotionally Abused: Not on file    Physically Abused: Not on file    Sexually Abused: Not on file   Housing Stability:     Unable to Pay for Housing in the Last Year: Not on file    Number of [place] in the Last Year: Not on file    Unstable Housing in the Last Year: Not on file       Physical Examination:  BP 98/70   Pulse 78   Ht 6' (1.829 m)   Wt 220 lb (99.8 kg)   BMI 29.84 kg/m²   Physical Exam  Constitutional:       Appearance: He is well-developed. Neck:      Vascular: No carotid bruit or JVD. Cardiovascular:      Rate and Rhythm: Normal rate and regular rhythm. Heart sounds: Normal heart sounds. No murmur heard. No friction rub. No gallop. Pulmonary:      Effort: Pulmonary effort is normal. No respiratory distress. Breath sounds: Normal breath sounds. No wheezing or rales. Abdominal:      General: There is no distension. Tenderness: There is no abdominal tenderness. Lymphadenopathy:      Cervical: No cervical adenopathy. Skin:     General: Skin is warm and dry. ASSESSMENT:     Diagnosis Orders   1. Mixed hyperlipidemia  Comprehensive Metabolic Panel    BNP    CBC    D-Dimer, Quantitative   2. Essential hypertension  Comprehensive Metabolic Panel    BNP    CBC    D-Dimer, Quantitative   3.  Coronary artery disease involving native coronary artery of native heart without angina pectoris  Comprehensive Metabolic Panel    BNP    CBC    D-Dimer, Quantitative   4. H/O heart artery stent  Comprehensive Metabolic Panel    BNP    CBC    D-Dimer, Quantitative   5. Chronic anticoagulation  Comprehensive Metabolic Panel    BNP    CBC    D-Dimer, Quantitative   6. Syncope and collapse  Comprehensive Metabolic Panel    BNP    CBC    D-Dimer, Quantitative   7. Shortness of breath  Comprehensive Metabolic Panel    BNP    CBC    D-Dimer, Quantitative    ECHO Complete 2D W Doppler W Color       PLAN:  Orders Placed This Encounter   Procedures    Comprehensive Metabolic Panel    BNP    CBC    D-Dimer, Quantitative    ECHO Complete 2D W Doppler W Color     No orders of the defined types were placed in this encounter. 1. Continue present medications  2. Cardiogram  3. Laboratory studies as ordered  4. Recommend follow-up assessment in 3 months    Return in about 3 months (around 9/2/2022) for return to Dr. Sylvia Stark only. Lorenzo Gibson MD 6/2/2022 2:15 PM CDT    [facility]      Thisdictation was generated by voice recognition computer software. Although all attempts are made to edit the dictation for accuracy, there may be errors in the transcription that are not intended.

## 2022-06-07 ENCOUNTER — HOSPITAL ENCOUNTER (OUTPATIENT)
Dept: NON INVASIVE DIAGNOSTICS | Age: 72
Discharge: HOME | End: 2022-06-07
Payer: MEDICARE

## 2022-06-07 DIAGNOSIS — I10 ESSENTIAL HYPERTENSION: ICD-10-CM

## 2022-06-07 DIAGNOSIS — R06.02 SHORTNESS OF BREATH: ICD-10-CM

## 2022-06-07 DIAGNOSIS — R55 SYNCOPE AND COLLAPSE: ICD-10-CM

## 2022-06-07 DIAGNOSIS — E78.2 MIXED HYPERLIPIDEMIA: ICD-10-CM

## 2022-06-07 DIAGNOSIS — Z79.01 CHRONIC ANTICOAGULATION: ICD-10-CM

## 2022-06-07 DIAGNOSIS — Z95.5 H/O HEART ARTERY STENT: ICD-10-CM

## 2022-06-07 DIAGNOSIS — I25.10 CORONARY ARTERY DISEASE INVOLVING NATIVE CORONARY ARTERY OF NATIVE HEART WITHOUT ANGINA PECTORIS: ICD-10-CM

## 2022-06-07 LAB
ALBUMIN SERPL-MCNC: 3.9 G/DL (ref 3.5–5.2)
ALP BLD-CCNC: 89 U/L (ref 40–130)
ALT SERPL-CCNC: 11 U/L (ref 5–41)
ANION GAP SERPL CALCULATED.3IONS-SCNC: 11 MMOL/L (ref 7–19)
AST SERPL-CCNC: 13 U/L (ref 5–40)
BILIRUB SERPL-MCNC: 0.5 MG/DL (ref 0.2–1.2)
BUN BLDV-MCNC: 13 MG/DL (ref 8–23)
CALCIUM SERPL-MCNC: 8.9 MG/DL (ref 8.8–10.2)
CHLORIDE BLD-SCNC: 100 MMOL/L (ref 98–111)
CO2: 28 MMOL/L (ref 22–29)
CREAT SERPL-MCNC: 1 MG/DL (ref 0.5–1.2)
D DIMER: 0.84 UG/ML FEU (ref 0–0.48)
GFR AFRICAN AMERICAN: >59
GFR NON-AFRICAN AMERICAN: >60
GLUCOSE BLD-MCNC: 107 MG/DL (ref 74–109)
HCT VFR BLD CALC: 42.9 % (ref 42–52)
HEMOGLOBIN: 13.1 G/DL (ref 14–18)
LV EF: 48 %
LVEF MODALITY: NORMAL
MCH RBC QN AUTO: 27.6 PG (ref 27–31)
MCHC RBC AUTO-ENTMCNC: 30.5 G/DL (ref 33–37)
MCV RBC AUTO: 90.5 FL (ref 80–94)
PDW BLD-RTO: 14.7 % (ref 11.5–14.5)
PLATELET # BLD: 268 K/UL (ref 130–400)
PMV BLD AUTO: 9 FL (ref 9.4–12.4)
POTASSIUM SERPL-SCNC: 3.5 MMOL/L (ref 3.5–5)
PRO-BNP: 180 PG/ML (ref 0–900)
RBC # BLD: 4.74 M/UL (ref 4.7–6.1)
SODIUM BLD-SCNC: 139 MMOL/L (ref 136–145)
TOTAL PROTEIN: 7.3 G/DL (ref 6.6–8.7)
WBC # BLD: 7.2 K/UL (ref 4.8–10.8)

## 2022-06-07 PROCEDURE — 6360000004 HC RX CONTRAST MEDICATION: Performed by: FAMILY MEDICINE

## 2022-06-07 PROCEDURE — C8929 TTE W OR WO FOL WCON,DOPPLER: HCPCS

## 2022-06-07 RX ADMIN — PERFLUTREN 1.65 MG: 6.52 INJECTION, SUSPENSION INTRAVENOUS at 14:35

## 2022-08-19 RX ORDER — LISINOPRIL 5 MG/1
TABLET ORAL
Qty: 90 TABLET | Refills: 3 | Status: SHIPPED | OUTPATIENT
Start: 2022-08-19

## 2022-09-08 ENCOUNTER — OFFICE VISIT (OUTPATIENT)
Dept: CARDIOLOGY CLINIC | Age: 72
End: 2022-09-08
Payer: MEDICARE

## 2022-09-08 VITALS
BODY MASS INDEX: 32.37 KG/M2 | OXYGEN SATURATION: 96 % | WEIGHT: 239 LBS | SYSTOLIC BLOOD PRESSURE: 124 MMHG | DIASTOLIC BLOOD PRESSURE: 80 MMHG | HEIGHT: 72 IN | HEART RATE: 102 BPM

## 2022-09-08 DIAGNOSIS — I10 ESSENTIAL HYPERTENSION: ICD-10-CM

## 2022-09-08 DIAGNOSIS — E78.5 DYSLIPIDEMIA: ICD-10-CM

## 2022-09-08 DIAGNOSIS — I25.10 CORONARY ARTERY DISEASE INVOLVING NATIVE CORONARY ARTERY OF NATIVE HEART WITHOUT ANGINA PECTORIS: Primary | ICD-10-CM

## 2022-09-08 PROCEDURE — 1123F ACP DISCUSS/DSCN MKR DOCD: CPT | Performed by: NURSE PRACTITIONER

## 2022-09-08 PROCEDURE — G8417 CALC BMI ABV UP PARAM F/U: HCPCS | Performed by: NURSE PRACTITIONER

## 2022-09-08 PROCEDURE — 99214 OFFICE O/P EST MOD 30 MIN: CPT | Performed by: NURSE PRACTITIONER

## 2022-09-08 PROCEDURE — 3017F COLORECTAL CA SCREEN DOC REV: CPT | Performed by: NURSE PRACTITIONER

## 2022-09-08 PROCEDURE — 93000 ELECTROCARDIOGRAM COMPLETE: CPT | Performed by: NURSE PRACTITIONER

## 2022-09-08 PROCEDURE — G8427 DOCREV CUR MEDS BY ELIG CLIN: HCPCS | Performed by: NURSE PRACTITIONER

## 2022-09-08 PROCEDURE — 4004F PT TOBACCO SCREEN RCVD TLK: CPT | Performed by: NURSE PRACTITIONER

## 2022-09-08 ASSESSMENT — ENCOUNTER SYMPTOMS
SHORTNESS OF BREATH: 1
CHEST TIGHTNESS: 0
WHEEZING: 0
COUGH: 0
SORE THROAT: 0

## 2022-09-08 NOTE — PROGRESS NOTES
[address] [facility] Cardiology   Established Patient Office Visit  [address] [facility]  [phone]        OFFICE VISIT:  9/8/2022    Jasmeet 79: 1950    Reason For Visit:  Zechariah Jimenez is a 81 y.o. male who is here for 3 Month Follow-Up (Patient states the Imdur has helped his chest pain subside. ) and Coronary Artery Disease    1. Coronary artery disease involving native coronary artery of native heart without angina pectoris    2. Essential hypertension    3. Dyslipidemia        Patient with a history of coronary artery disease, hypertension and hyperlipidemia. He has a history of longstanding tobacco abuse in the past he is on chronic O2. He is a patient of Dr. Ermelinda Jackson. Patient was last seen in the office on 6/2/2022 by Dr. Ermelinda Jackson. 2D echo and lab work was ordered which showed:    EF 45 to 50%. No regional wall motion abnormalities. proBNP within normal limits. Electrolytes within normal limits. Patient presents to clinic today for follow-up. States that Imdur has really helped with his chest pain. He is no longer having any. His shortness of breath is at baseline with no worsening. He is on O2 2 L 24 hours a day. There is no orthopnea or PND. Patient denies any lightheadedness, dizziness or syncope.     Christi Srinivasan is a 81 y.o. male with the following history as recorded in [facility]:    Patient Active Problem List    Diagnosis Date Noted    H/O heart artery stent 02/22/2021    Arm DVT (deep venous thromboembolism), acute, right ([address] 04/30/2020    Chronic anticoagulation 04/30/2020    Mixed hyperlipidemia 04/29/2020    History of myocardial infarction 04/29/2020    History of coronary artery stent placement 04/29/2020    Stable angina ([address] 04/29/2020    Epistaxis 04/29/2020    Palliative care patient 03/12/2020    NSTEMI (non-ST elevated myocardial infarction) ([address] 03/11/2020    Chronic obstructive pulmonary disease ([address] 04/18/2019 Essential hypertension 04/18/2019    Syncope and collapse 04/17/2019    Chest pain 11/14/2018    Coronary artery disease involving native coronary artery of native heart 06/05/2018    STEMI (ST elevation myocardial infarction) ([facility] 06/05/2018    Acute ST elevation myocardial infarction (STEMI) involving left anterior descending (LAD) coronary artery ([facility])     Electronic cigarette use      Current Outpatient Medications   Medication Sig Dispense Refill    lisinopril (PRINIVIL;ZESTRIL) 5 MG tablet TAKE ONE TABLET BY MOUTH ONCE DAILY 90 tablet 3    isosorbide mononitrate (IMDUR) 60 MG extended release tablet Take 1 tablet by mouth daily TAKE ONE TABLET BY MOUTH ONCE A DAY *DO NOT TAKE IF SYSTOLIC BLOOD PRESSURE IS LESS THAN 100* 90 tablet 3    metoprolol succinate (TOPROL XL) 25 MG extended release tablet Take 1 tablet by mouth daily 90 tablet 3    furosemide (LASIX) 20 MG tablet Take 1 tablet by mouth daily 90 tablet 3    clopidogrel (PLAVIX) 75 MG tablet Take 1 tablet by mouth daily 90 tablet 3    predniSONE (DELTASONE) 10 MG tablet prednisone 10 mg tablet      fluticasone-umeclidin-vilant (TRELEGY ELLIPTA) 100-62.5-25 MCG/INH AEPB Inhale 1 puff into the lungs daily      famotidine (PEPCID) 20 MG tablet Take 20 mg by mouth 2 times daily      ferrous sulfate (IRON 325) 325 (65 Fe) MG tablet Take 325 mg by mouth daily (with breakfast)      potassium chloride (MICRO-K) 10 MEQ extended release capsule Take 10 mEq by mouth 2 times daily      atorvastatin (LIPITOR) 80 MG tablet TAKE 1 TABLET BY MOUTH ONCE DAILY 30 tablet 0    tamsulosin (FLOMAX) 0.4 MG capsule Take 0.4 mg by mouth daily      nitroGLYCERIN (NITROSTAT) 0.4 MG SL tablet Place 1 tablet under the tongue every 5 minutes as needed for Chest pain 25 tablet 3    aspirin 81 MG chewable tablet Take 1 tablet by mouth daily 30 tablet 3    ipratropium-albuterol (DUONEB) 0.5-2.5 (3) MG/3ML SOLN nebulizer solution Inhale 3 mLs into the lungs every 4 hours (while awake) 360 mL 2     No current facility-administered medications for this visit. Allergies: Diphenhydramine  Past Medical History:   Diagnosis Date    CAD (coronary artery disease)     Chest pain 2018    CHF (congestive heart failure) (HCC)     COPD (chronic obstructive pulmonary disease) ([facility])     Hyperlipidemia     Hypertension     Palliative care patient 2020    Pneumonia      Past Surgical History:   Procedure Laterality Date    APPENDECTOMY      BACK SURGERY      COLONOSCOPY      ENDOSCOPY, COLON, DIAGNOSTIC       Family History   Problem Relation Age of Onset    Cancer Sister     Cancer Brother      Social History     Tobacco Use    Smoking status: Former     Packs/day: 1.00     Years: 50.00     Pack years: 50.00     Types: Cigarettes     Quit date: 2018     Years since quittin.2    Smokeless tobacco: Current     Types: Chew   Substance Use Topics    Alcohol use: Not Currently     Comment: occassional          Review of Systems:    Review of Systems   Constitutional:  Negative for activity change, chills, diaphoresis, fatigue and fever. HENT:  Negative for congestion and sore throat. Respiratory:  Positive for shortness of breath (Baseline no worsening). Negative for cough, chest tightness and wheezing. Cardiovascular:  Negative for chest pain, palpitations and leg swelling. Neurological:  Negative for dizziness, syncope, light-headedness and headaches. Psychiatric/Behavioral:  Negative for confusion. The patient is not nervous/anxious.        Objective:    /80   Pulse (!) 102   Ht 6' (1.829 m)   Wt 239 lb (108.4 kg)   SpO2 96%   BMI 32.41 kg/m²     GENERAL - well developed and well nourished, conversant  HEENT -  PERRLA, Hearing appears normal, gentleman lids are normal.  External inspection of ears and nose appear normal.  NECK - no thyromegaly, no JVD, trachea is in the midline  CARDIOVASCULAR - PMI is in the mid line clavicular position, Normal S1 and S2 with no systolic murmur. No S3 or S4    PULMONARY - no respiratory distress. No wheezes or rales. Lungs are clear to ausculation, normal respiratory effort. ABDOMEN  - soft, non tender, no rebound  MUSCULOSKELETAL  - range of motion of the upper and lower extermites appears normal and equal and is without pain   EXTREMITIES - no significant edema   NEUROLOGIC - gait and station are normal  SKIN - turgor is normal, can warm and dry. PSYCHIATRIC - normal mood and affect, alert and orientated x 3,      ASSESSMENT:    ALL THE CARDIOLOGY PROBLEMS ARE LISTED ABOVE; HOWEVER, THE FOLLOWING SPECIFIC CARDIAC PROBLEMS / CONDITIONS WERE ADDRESSED AND TREATED DURING THE OFFICE VISIT TODAY:                                                                                            MEDICAL DECISION MAKING             Cardiac Specific Problem / Diagnosis  Discussion and Data Reviewed Diagnostic Procedures Ordered Management Options Selected           1. CAD  Stable   Review and summation of old records:    No chest pain on Imdur, aspirin, Lipitor, Plavix and Toprol-XL. EKG in the office today showing sinus rhythm with heart rate of 97 bpm.  Right bundle branch block. This is unchanged from previous EKG. Yes Continue current medications:     Yes           2. Hypertension  Well Controlled   Blood pressure in the office today 124/80. O2 sat 96%. Patient is on lisinopril 5 mg daily. Toprol-XL 25 mg daily. No Continue current medications:    Yes           3. Dyslipidemia Stable Patient is on Lipitor 80 mg daily. No Continue current medications: Yes                     Orders Placed This Encounter   Procedures    EKG 12 lead     Order Specific Question:   Reason for Exam?     Answer: Other       No orders of the defined types were placed in this encounter. Discussed with patient and spouse. Return in about 6 months (around 3/8/2023) for Dr Dina Watts .     I greatly appreciate the opportunity to meet Lonza Older and your confidence in allowing me to participate in his cardiovascular care. CHALO Pitts NP  9/8/2022 11:08 AM CDT                    This dictation was generated by voice recognition computer software. Although all attempts are made to edit dictation for accuracy, there may be errors in the transcription that are not intended.

## 2022-10-22 DIAGNOSIS — I25.10 CORONARY ARTERY DISEASE INVOLVING NATIVE CORONARY ARTERY OF NATIVE HEART WITHOUT ANGINA PECTORIS: ICD-10-CM

## 2022-10-24 RX ORDER — FUROSEMIDE 20 MG/1
TABLET ORAL
Qty: 90 TABLET | Refills: 3 | Status: SHIPPED | OUTPATIENT
Start: 2022-10-24

## 2022-10-24 RX ORDER — CLOPIDOGREL BISULFATE 75 MG/1
TABLET ORAL
Qty: 90 TABLET | Refills: 3 | Status: SHIPPED | OUTPATIENT
Start: 2022-10-24

## 2023-02-03 RX ORDER — ISOSORBIDE MONONITRATE 60 MG/1
TABLET, EXTENDED RELEASE ORAL
Qty: 90 TABLET | Refills: 3 | Status: SHIPPED | OUTPATIENT
Start: 2023-02-03

## 2023-02-22 RX ORDER — METOPROLOL SUCCINATE 25 MG/1
TABLET, EXTENDED RELEASE ORAL
Qty: 90 TABLET | Refills: 3 | Status: SHIPPED | OUTPATIENT
Start: 2023-02-22

## 2023-03-09 ENCOUNTER — OFFICE VISIT (OUTPATIENT)
Dept: CARDIOLOGY CLINIC | Age: 73
End: 2023-03-09

## 2023-03-09 VITALS
SYSTOLIC BLOOD PRESSURE: 122 MMHG | HEART RATE: 113 BPM | WEIGHT: 220 LBS | BODY MASS INDEX: 29.8 KG/M2 | DIASTOLIC BLOOD PRESSURE: 74 MMHG | HEIGHT: 72 IN

## 2023-03-09 DIAGNOSIS — I10 ESSENTIAL HYPERTENSION: ICD-10-CM

## 2023-03-09 DIAGNOSIS — R06.02 SHORTNESS OF BREATH: ICD-10-CM

## 2023-03-09 DIAGNOSIS — E78.5 DYSLIPIDEMIA: ICD-10-CM

## 2023-03-09 DIAGNOSIS — Z95.5 H/O HEART ARTERY STENT: ICD-10-CM

## 2023-03-09 DIAGNOSIS — R55 SYNCOPE AND COLLAPSE: ICD-10-CM

## 2023-03-09 DIAGNOSIS — I25.10 CORONARY ARTERY DISEASE INVOLVING NATIVE CORONARY ARTERY OF NATIVE HEART WITHOUT ANGINA PECTORIS: Primary | ICD-10-CM

## 2023-03-09 DIAGNOSIS — Z79.01 CHRONIC ANTICOAGULATION: ICD-10-CM

## 2023-03-09 PROCEDURE — 3078F DIAST BP <80 MM HG: CPT | Performed by: INTERNAL MEDICINE

## 2023-03-09 PROCEDURE — 99213 OFFICE O/P EST LOW 20 MIN: CPT | Performed by: INTERNAL MEDICINE

## 2023-03-09 PROCEDURE — 1123F ACP DISCUSS/DSCN MKR DOCD: CPT | Performed by: INTERNAL MEDICINE

## 2023-03-09 PROCEDURE — 3074F SYST BP LT 130 MM HG: CPT | Performed by: INTERNAL MEDICINE

## 2023-03-09 ASSESSMENT — ENCOUNTER SYMPTOMS
RESPIRATORY NEGATIVE: 1
GASTROINTESTINAL NEGATIVE: 1
NAUSEA: 0
DIARRHEA: 0
VOMITING: 0
SHORTNESS OF BREATH: 0
EYES NEGATIVE: 1

## 2023-03-09 NOTE — PROGRESS NOTES
[facility] Progress Note                            Date:  3/9/2023  Patient: Jannet Bryant  Age:  81 y.o., 1950      Reason for evaluation:         SUBJECTIVE:    Returns today follow-up assessment coronary artery disease previous stent hypertension hyperlipidemia chronic anticoagulation overall doing about the same. He reports last medication that we started him on seems to have really helped and his chest discomfort has substantially decreased in frequency and severity. He still has chest pain every once in a while but much less frequent. He is on oxygen 24 hours daily chronic dyspnea what I presume to be COPD. Blood pressure 122/74 heart rate 113. Echocardiogram 6/2/2022 ejection fraction 45 to 50% no definite wall motion abnormalities noted. Cardiac cath 6/5/2018 reviewed 99% mid LAD 2 stents placed 2.5 x 18 and 2.5 x 28 bare-metal stents diffuse coronary disease anterolateral dyskinesis EF 40%. Review of Systems   Constitutional: Negative. Negative for chills, fever and unexpected weight change. HENT: Negative. Eyes: Negative. Respiratory: Negative. Negative for shortness of breath. Cardiovascular: Negative. Negative for chest pain. Gastrointestinal: Negative. Negative for diarrhea, nausea and vomiting. Endocrine: Negative. Genitourinary: Negative. Musculoskeletal: Negative. Skin: Negative. Neurological: Negative. All other systems reviewed and are negative. OBJECTIVE:     /74   Pulse (!) 113   Ht 6' (1.829 m)   Wt 220 lb (99.8 kg)   BMI 29.84 kg/m²     Labs:   CBC: No results for input(s): WBC, HGB, HCT, PLT in the last 72 hours. BMP:No results for input(s): NA, K, CO2, BUN, CREATININE, LABGLOM, GLUCOSE in the last 72 hours. BNP: No results for input(s): BNP in the last 72 hours. PT/INR: No results for input(s): PROTIME, INR in the last 72 hours. APTT:No results for input(s): APTT in the last 72 hours.   CARDIAC ENZYMES:No results for input(s): CKTOTAL, CKMB, CKMBINDEX, TROPONINI in the last 72 hours. FASTING LIPID PANEL:  Lab Results   Component Value Date/Time    HDL 43 02/22/2021 03:14 PM    LDLCALC 109 02/22/2021 03:14 PM    TRIG 106 02/22/2021 03:14 PM     LIVER PROFILE:No results for input(s): AST, ALT, LABALBU in the last 72 hours.         Past Medical History:   Diagnosis Date    CAD (coronary artery disease)     Chest pain 11/14/2018    CHF (congestive heart failure) (HCC)     COPD (chronic obstructive pulmonary disease) (HCC)     Hyperlipidemia     Hypertension     Palliative care patient 03/12/2020    Pneumonia      Past Surgical History:   Procedure Laterality Date    APPENDECTOMY      BACK SURGERY      COLONOSCOPY      ENDOSCOPY, COLON, DIAGNOSTIC       Family History   Problem Relation Age of Onset    Cancer Sister     Cancer Brother      Allergies   Allergen Reactions    Diphenhydramine Itching     Current Outpatient Medications   Medication Sig Dispense Refill    metoprolol succinate (TOPROL XL) 25 MG extended release tablet TAKE 1 TABLET EVERY DAY 90 tablet 3    isosorbide mononitrate (IMDUR) 60 MG extended release tablet TAKE 1 TABLET DAILY (DO NOT TAKE IF SYSTOLIC BLOOD PRESSURE IS LESS THAN 100)(DOSE INCREASE) 90 tablet 3    clopidogrel (PLAVIX) 75 MG tablet TAKE 1 TABLET EVERY DAY 90 tablet 3    furosemide (LASIX) 20 MG tablet TAKE 1 TABLET EVERY DAY 90 tablet 3    lisinopril (PRINIVIL;ZESTRIL) 5 MG tablet TAKE ONE TABLET BY MOUTH ONCE DAILY 90 tablet 3    predniSONE (DELTASONE) 10 MG tablet prednisone 10 mg tablet      fluticasone-umeclidin-vilant (TRELEGY ELLIPTA) 100-62.5-25 MCG/INH AEPB Inhale 1 puff into the lungs daily      famotidine (PEPCID) 20 MG tablet Take 20 mg by mouth 2 times daily      ferrous sulfate (IRON 325) 325 (65 Fe) MG tablet Take 325 mg by mouth daily (with breakfast)      potassium chloride (MICRO-K) 10 MEQ extended release capsule Take 10 mEq by mouth 2 times daily      atorvastatin (LIPITOR) 80 MG tablet TAKE 1 TABLET BY MOUTH ONCE DAILY 30 tablet 0    tamsulosin (FLOMAX) 0.4 MG capsule Take 0.4 mg by mouth daily      nitroGLYCERIN (NITROSTAT) 0.4 MG SL tablet Place 1 tablet under the tongue every 5 minutes as needed for Chest pain 25 tablet 3    aspirin 81 MG chewable tablet Take 1 tablet by mouth daily 30 tablet 3    ipratropium-albuterol (DUONEB) 0.5-2.5 (3) MG/3ML SOLN nebulizer solution Inhale 3 mLs into the lungs every 4 hours (while awake) 360 mL 2     No current facility-administered medications for this visit.      Social History     Socioeconomic History    Marital status:      Spouse name: Bennie Mitchell    Number of children: 3    Years of education: 8    Highest education level: Not on file   Occupational History    Not on file   Tobacco Use    Smoking status: Former     Packs/day: 1.00     Years: 50.00     Pack years: 50.00     Types: Cigarettes     Quit date: 2018     Years since quittin.7    Smokeless tobacco: Current     Types: Chew   Vaping Use    Vaping Use: Never used   Substance and Sexual Activity    Alcohol use: Not Currently     Comment: occassional    Drug use: No    Sexual activity: Not on file   Other Topics Concern    Not on file   Social History Narrative     50 years only marriage    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     Never in the [employer] high school    Walks with the assistance of a cane and a walker    Drives infrequently    Smoked 3 to 4 packs/day quit 2 years ago denies alcohol consumption or substance usage    Physically sedentary     Social Determinants of Health     Financial Resource Strain: Not on file   Food Insecurity: Not on file   Transportation Needs: Not on file   Physical Activity: Not on file   Stress: Not on file   Social Connections: Not on file   Intimate Partner Violence: Not on file   Housing Stability: Not on file       Physical Examination:  /74   Pulse (!) 113   Ht 6' (1.829 m) Wt 220 lb (99.8 kg)   BMI 29.84 kg/m²   Physical Exam  Vitals reviewed. Constitutional:       Appearance: He is well-developed. Neck:      Vascular: No carotid bruit or JVD. Cardiovascular:      Rate and Rhythm: Normal rate and regular rhythm. Heart sounds: Normal heart sounds. No murmur heard. No friction rub. No gallop. Pulmonary:      Effort: Pulmonary effort is normal. No respiratory distress. Breath sounds: Normal breath sounds. No wheezing or rales. Abdominal:      General: There is no distension. Tenderness: There is no abdominal tenderness. Lymphadenopathy:      Cervical: No cervical adenopathy. Skin:     General: Skin is warm and dry. ASSESSMENT:     Diagnosis Orders   1. Coronary artery disease involving native coronary artery of native heart without angina pectoris        2. Essential hypertension        3. Dyslipidemia        4. H/O heart artery stent        5. Chronic anticoagulation        6. Syncope and collapse        7. Shortness of breath            PLAN:  No orders of the defined types were placed in this encounter. No orders of the defined types were placed in this encounter. Continue present medications  Recommend follow-up assessment in 6 months    Return in about 6 months (around 9/9/2023) for return to Dr. Castillo Her only. Jeb Aparicio MD 3/9/2023 11:15 AM CST    [facility]      Thisdictation was generated by voice recognition computer software. Although all attempts are made to edit the dictation for accuracy, there may be errors in the transcription that are not intended.

## 2023-09-19 RX ORDER — LISINOPRIL 5 MG/1
TABLET ORAL
Qty: 90 TABLET | Refills: 3 | Status: SHIPPED | OUTPATIENT
Start: 2023-09-19

## 2023-09-21 ENCOUNTER — OFFICE VISIT (OUTPATIENT)
Dept: CARDIOLOGY CLINIC | Age: 73
End: 2023-09-21

## 2023-09-21 VITALS
BODY MASS INDEX: 31.15 KG/M2 | HEIGHT: 72 IN | WEIGHT: 230 LBS | HEART RATE: 104 BPM | SYSTOLIC BLOOD PRESSURE: 126 MMHG | DIASTOLIC BLOOD PRESSURE: 78 MMHG

## 2023-09-21 DIAGNOSIS — R06.02 SHORTNESS OF BREATH: ICD-10-CM

## 2023-09-21 DIAGNOSIS — I10 ESSENTIAL HYPERTENSION: ICD-10-CM

## 2023-09-21 DIAGNOSIS — R94.31 ABNORMAL EKG: Primary | ICD-10-CM

## 2023-09-21 DIAGNOSIS — R55 SYNCOPE AND COLLAPSE: ICD-10-CM

## 2023-09-21 DIAGNOSIS — I25.10 CORONARY ARTERY DISEASE INVOLVING NATIVE CORONARY ARTERY OF NATIVE HEART WITHOUT ANGINA PECTORIS: ICD-10-CM

## 2023-09-21 PROCEDURE — 93000 ELECTROCARDIOGRAM COMPLETE: CPT | Performed by: INTERNAL MEDICINE

## 2023-09-21 PROCEDURE — 3078F DIAST BP <80 MM HG: CPT | Performed by: INTERNAL MEDICINE

## 2023-09-21 PROCEDURE — 3074F SYST BP LT 130 MM HG: CPT | Performed by: INTERNAL MEDICINE

## 2023-09-21 PROCEDURE — 1123F ACP DISCUSS/DSCN MKR DOCD: CPT | Performed by: INTERNAL MEDICINE

## 2023-09-21 PROCEDURE — 99213 OFFICE O/P EST LOW 20 MIN: CPT | Performed by: INTERNAL MEDICINE

## 2023-09-21 ASSESSMENT — ENCOUNTER SYMPTOMS
DIARRHEA: 0
SHORTNESS OF BREATH: 0
EYES NEGATIVE: 1
VOMITING: 0
NAUSEA: 0
RESPIRATORY NEGATIVE: 1
GASTROINTESTINAL NEGATIVE: 1

## 2023-11-26 DIAGNOSIS — I25.10 CORONARY ARTERY DISEASE INVOLVING NATIVE CORONARY ARTERY OF NATIVE HEART WITHOUT ANGINA PECTORIS: ICD-10-CM

## 2023-11-29 RX ORDER — CLOPIDOGREL BISULFATE 75 MG/1
TABLET ORAL
Qty: 90 TABLET | Refills: 3 | Status: SHIPPED | OUTPATIENT
Start: 2023-11-29

## 2023-11-29 RX ORDER — FUROSEMIDE 20 MG/1
TABLET ORAL
Qty: 90 TABLET | Refills: 3 | Status: SHIPPED | OUTPATIENT
Start: 2023-11-29

## 2024-02-12 RX ORDER — ISOSORBIDE MONONITRATE 60 MG/1
TABLET, EXTENDED RELEASE ORAL
Qty: 90 TABLET | Refills: 1 | Status: SHIPPED | OUTPATIENT
Start: 2024-02-12

## 2024-03-28 ENCOUNTER — HOSPITAL ENCOUNTER (OUTPATIENT)
Dept: GENERAL RADIOLOGY | Age: 74
Discharge: HOME | End: 2024-03-28
Payer: MEDICARE

## 2024-03-28 ENCOUNTER — HOSPITAL ENCOUNTER (OUTPATIENT)
Dept: LAB | Age: 74
Discharge: HOME | End: 2024-03-28
Payer: MEDICARE

## 2024-03-28 ENCOUNTER — OFFICE VISIT (OUTPATIENT)
Dept: CARDIOLOGY CLINIC | Age: 74
End: 2024-03-28
Payer: MEDICARE

## 2024-03-28 VITALS
DIASTOLIC BLOOD PRESSURE: 86 MMHG | HEIGHT: 72 IN | SYSTOLIC BLOOD PRESSURE: 124 MMHG | BODY MASS INDEX: 32.78 KG/M2 | WEIGHT: 242 LBS | HEART RATE: 110 BPM

## 2024-03-28 DIAGNOSIS — Z79.01 CHRONIC ANTICOAGULATION: ICD-10-CM

## 2024-03-28 DIAGNOSIS — E78.5 DYSLIPIDEMIA: ICD-10-CM

## 2024-03-28 DIAGNOSIS — I10 ESSENTIAL HYPERTENSION: ICD-10-CM

## 2024-03-28 DIAGNOSIS — R06.02 SHORTNESS OF BREATH: ICD-10-CM

## 2024-03-28 DIAGNOSIS — Z95.5 H/O HEART ARTERY STENT: ICD-10-CM

## 2024-03-28 DIAGNOSIS — I25.10 CORONARY ARTERY DISEASE INVOLVING NATIVE CORONARY ARTERY OF NATIVE HEART WITHOUT ANGINA PECTORIS: Primary | ICD-10-CM

## 2024-03-28 DIAGNOSIS — R55 SYNCOPE AND COLLAPSE: ICD-10-CM

## 2024-03-28 DIAGNOSIS — I25.10 CORONARY ARTERY DISEASE INVOLVING NATIVE CORONARY ARTERY OF NATIVE HEART WITHOUT ANGINA PECTORIS: ICD-10-CM

## 2024-03-28 LAB
ANION GAP SERPL CALCULATED.3IONS-SCNC: 14 MMOL/L (ref 7–19)
BNP BLD-MCNC: <36 PG/ML (ref 0–124)
BUN SERPL-MCNC: 14 MG/DL (ref 8–23)
CALCIUM SERPL-MCNC: 9 MG/DL (ref 8.8–10.2)
CHLORIDE SERPL-SCNC: 98 MMOL/L (ref 98–111)
CO2 SERPL-SCNC: 27 MMOL/L (ref 22–29)
CREAT SERPL-MCNC: 1.3 MG/DL (ref 0.5–1.2)
GLUCOSE SERPL-MCNC: 113 MG/DL (ref 74–109)
POTASSIUM SERPL-SCNC: 3.6 MMOL/L (ref 3.5–5)
SODIUM SERPL-SCNC: 139 MMOL/L (ref 136–145)

## 2024-03-28 PROCEDURE — 3074F SYST BP LT 130 MM HG: CPT | Performed by: INTERNAL MEDICINE

## 2024-03-28 PROCEDURE — 71046 X-RAY EXAM CHEST 2 VIEWS: CPT

## 2024-03-28 PROCEDURE — 4004F PT TOBACCO SCREEN RCVD TLK: CPT | Performed by: INTERNAL MEDICINE

## 2024-03-28 PROCEDURE — G8417 CALC BMI ABV UP PARAM F/U: HCPCS | Performed by: INTERNAL MEDICINE

## 2024-03-28 PROCEDURE — 1123F ACP DISCUSS/DSCN MKR DOCD: CPT | Performed by: INTERNAL MEDICINE

## 2024-03-28 PROCEDURE — G8484 FLU IMMUNIZE NO ADMIN: HCPCS | Performed by: INTERNAL MEDICINE

## 2024-03-28 PROCEDURE — 3017F COLORECTAL CA SCREEN DOC REV: CPT | Performed by: INTERNAL MEDICINE

## 2024-03-28 PROCEDURE — 3079F DIAST BP 80-89 MM HG: CPT | Performed by: INTERNAL MEDICINE

## 2024-03-28 PROCEDURE — 99214 OFFICE O/P EST MOD 30 MIN: CPT | Performed by: INTERNAL MEDICINE

## 2024-03-28 PROCEDURE — G8427 DOCREV CUR MEDS BY ELIG CLIN: HCPCS | Performed by: INTERNAL MEDICINE

## 2024-03-28 NOTE — PROGRESS NOTES
[facility]                            Date:  3/28/2024  Patient: Alejandro Reyes  Age:  74 y.o., 1950      Reason for evaluation:         SUBJECTIVE:    Review times a day follow-up assessment coronary disease previous stents hypertension chronic shortness of breath chronic anticoagulation dyslipidemia.  States that he is very short of breath just walking a short distance has occasional chest pain not necessarily with activities takes nitroglycerin infrequently not recently.  Does not see a pulmonary specialist he smoked for quite a long time.  He is on oxygen at home no other complaints or issues reported.  No stress test in recent years.  Blood pressure 124/86 heart rate 110.    Review of Systems   Constitutional: Negative.  Negative for chills, fever and unexpected weight change.   HENT: Negative.     Eyes: Negative.    Respiratory: Negative.  Negative for shortness of breath.    Cardiovascular: Negative.  Negative for chest pain.   Gastrointestinal: Negative.  Negative for diarrhea, nausea and vomiting.   Endocrine: Negative.    Genitourinary: Negative.    Musculoskeletal: Negative.    Skin: Negative.    Neurological: Negative.    All other systems reviewed and are negative.        OBJECTIVE:     /86   Pulse (!) 110   Ht 1.829 m (6')   Wt 109.8 kg (242 lb)   BMI 32.82 kg/m²     Labs:   CBC: No results for input(s): \"WBC\", \"HGB\", \"HCT\", \"PLT\" in the last 72 hours.  BMP:No results for input(s): \"NA\", \"K\", \"CO2\", \"BUN\", \"CREATININE\", \"LABGLOM\", \"GLUCOSE\" in the last 72 hours.  BNP: No results for input(s): \"BNP\" in the last 72 hours.  PT/INR: No results for input(s): \"PROTIME\", \"INR\" in the last 72 hours.  APTT:No results for input(s): \"APTT\" in the last 72 hours.  CARDIAC ENZYMES:No results for input(s): \"CKTOTAL\", \"CKMB\", \"CKMBINDEX\", \"TROPONINI\" in the last 72 hours.  FASTING LIPID PANEL:  Lab Results   Component Value Date/Time    HDL 43 02/22/2021 03:14 PM    LDLCALC

## 2024-04-26 RX ORDER — METOPROLOL SUCCINATE 25 MG/1
TABLET, EXTENDED RELEASE ORAL
Qty: 90 TABLET | Refills: 1 | Status: SHIPPED | OUTPATIENT
Start: 2024-04-26

## 2024-05-01 ENCOUNTER — HOSPITAL ENCOUNTER (OUTPATIENT)
Dept: NUCLEAR MEDICINE | Age: 74
Discharge: HOME | End: 2024-05-03
Attending: INTERNAL MEDICINE
Payer: MEDICARE

## 2024-05-01 DIAGNOSIS — I25.10 CORONARY ARTERY DISEASE INVOLVING NATIVE CORONARY ARTERY OF NATIVE HEART WITHOUT ANGINA PECTORIS: ICD-10-CM

## 2024-05-01 DIAGNOSIS — Z79.01 CHRONIC ANTICOAGULATION: ICD-10-CM

## 2024-05-01 DIAGNOSIS — R06.02 SHORTNESS OF BREATH: ICD-10-CM

## 2024-05-01 DIAGNOSIS — R55 SYNCOPE AND COLLAPSE: ICD-10-CM

## 2024-05-01 DIAGNOSIS — E78.5 DYSLIPIDEMIA: ICD-10-CM

## 2024-05-01 DIAGNOSIS — Z95.5 H/O HEART ARTERY STENT: ICD-10-CM

## 2024-05-01 DIAGNOSIS — I10 ESSENTIAL HYPERTENSION: ICD-10-CM

## 2024-05-01 PROCEDURE — 93017 CV STRESS TEST TRACING ONLY: CPT

## 2024-05-01 PROCEDURE — 6360000002 HC RX W HCPCS: Performed by: INTERNAL MEDICINE

## 2024-05-01 PROCEDURE — A9502 TC99M TETROFOSMIN: HCPCS | Performed by: INTERNAL MEDICINE

## 2024-05-01 PROCEDURE — 78452 HT MUSCLE IMAGE SPECT MULT: CPT

## 2024-05-01 PROCEDURE — 3430000000 HC RX DIAGNOSTIC RADIOPHARMACEUTICAL: Performed by: INTERNAL MEDICINE

## 2024-05-01 RX ORDER — REGADENOSON 0.08 MG/ML
0.4 INJECTION, SOLUTION INTRAVENOUS
Status: COMPLETED | OUTPATIENT
Start: 2024-05-01 | End: 2024-05-01

## 2024-05-01 RX ADMIN — TETROFOSMIN 8 MILLICURIE: 0.23 INJECTION, POWDER, LYOPHILIZED, FOR SOLUTION INTRAVENOUS at 13:38

## 2024-05-01 RX ADMIN — TETROFOSMIN 24 MILLICURIE: 0.23 INJECTION, POWDER, LYOPHILIZED, FOR SOLUTION INTRAVENOUS at 13:36

## 2024-05-01 RX ADMIN — REGADENOSON 0.4 MG: 0.08 INJECTION, SOLUTION INTRAVENOUS at 10:36

## 2024-05-20 ENCOUNTER — OFFICE VISIT (OUTPATIENT)
Dept: CARDIOLOGY CLINIC | Age: 74
End: 2024-05-20
Payer: MEDICARE

## 2024-05-20 VITALS
SYSTOLIC BLOOD PRESSURE: 126 MMHG | HEART RATE: 92 BPM | DIASTOLIC BLOOD PRESSURE: 82 MMHG | HEIGHT: 72 IN | WEIGHT: 242 LBS | BODY MASS INDEX: 32.78 KG/M2

## 2024-05-20 DIAGNOSIS — R06.02 SHORTNESS OF BREATH: ICD-10-CM

## 2024-05-20 DIAGNOSIS — E78.2 MIXED HYPERLIPIDEMIA: ICD-10-CM

## 2024-05-20 DIAGNOSIS — I10 ESSENTIAL HYPERTENSION: ICD-10-CM

## 2024-05-20 DIAGNOSIS — R94.31 ABNORMAL EKG: ICD-10-CM

## 2024-05-20 DIAGNOSIS — I25.10 CORONARY ARTERY DISEASE INVOLVING NATIVE CORONARY ARTERY OF NATIVE HEART WITHOUT ANGINA PECTORIS: Primary | ICD-10-CM

## 2024-05-20 DIAGNOSIS — Z95.5 H/O HEART ARTERY STENT: ICD-10-CM

## 2024-05-20 DIAGNOSIS — R55 SYNCOPE AND COLLAPSE: ICD-10-CM

## 2024-05-20 PROCEDURE — 99214 OFFICE O/P EST MOD 30 MIN: CPT | Performed by: INTERNAL MEDICINE

## 2024-05-20 PROCEDURE — 3079F DIAST BP 80-89 MM HG: CPT | Performed by: INTERNAL MEDICINE

## 2024-05-20 PROCEDURE — 3017F COLORECTAL CA SCREEN DOC REV: CPT | Performed by: INTERNAL MEDICINE

## 2024-05-20 PROCEDURE — G8417 CALC BMI ABV UP PARAM F/U: HCPCS | Performed by: INTERNAL MEDICINE

## 2024-05-20 PROCEDURE — 1123F ACP DISCUSS/DSCN MKR DOCD: CPT | Performed by: INTERNAL MEDICINE

## 2024-05-20 PROCEDURE — G8427 DOCREV CUR MEDS BY ELIG CLIN: HCPCS | Performed by: INTERNAL MEDICINE

## 2024-05-20 PROCEDURE — 3074F SYST BP LT 130 MM HG: CPT | Performed by: INTERNAL MEDICINE

## 2024-05-20 PROCEDURE — 4004F PT TOBACCO SCREEN RCVD TLK: CPT | Performed by: INTERNAL MEDICINE

## 2024-05-20 ASSESSMENT — ENCOUNTER SYMPTOMS
VOMITING: 0
EYES NEGATIVE: 1
RESPIRATORY NEGATIVE: 1
SHORTNESS OF BREATH: 0
GASTROINTESTINAL NEGATIVE: 1
NAUSEA: 0
DIARRHEA: 0

## 2024-05-20 NOTE — PROGRESS NOTES
capsule Take 1 capsule by mouth 2 times daily      atorvastatin (LIPITOR) 80 MG tablet TAKE 1 TABLET BY MOUTH ONCE DAILY 30 tablet 0    tamsulosin (FLOMAX) 0.4 MG capsule Take 1 capsule by mouth daily      nitroGLYCERIN (NITROSTAT) 0.4 MG SL tablet Place 1 tablet under the tongue every 5 minutes as needed for Chest pain 25 tablet 3    aspirin 81 MG chewable tablet Take 1 tablet by mouth daily 30 tablet 3    ipratropium-albuterol (DUONEB) 0.5-2.5 (3) MG/3ML SOLN nebulizer solution Inhale 3 mLs into the lungs every 4 hours (while awake) 360 mL 2     No current facility-administered medications for this visit.     Social History     Socioeconomic History    Marital status:      Spouse name: Olive Guzman    Number of children: 3    Years of education: 8    Highest education level: Not on file   Occupational History    Not on file   Tobacco Use    Smoking status: Former     Current packs/day: 0.00     Average packs/day: 1 pack/day for 50.0 years (50.0 ttl pk-yrs)     Types: Cigarettes     Start date: 1968     Quit date: 2018     Years since quittin.9    Smokeless tobacco: Current     Types: Chew   Vaping Use    Vaping Use: Never used   Substance and Sexual Activity    Alcohol use: Not Currently     Comment: occassional    Drug use: No    Sexual activity: Not on file   Other Topics Concern    Not on file   Social History Narrative     48 years only marriage    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

## 2024-07-11 RX ORDER — ISOSORBIDE MONONITRATE 60 MG/1
TABLET, EXTENDED RELEASE ORAL
Qty: 90 TABLET | Refills: 2 | Status: SHIPPED | OUTPATIENT
Start: 2024-07-11

## 2024-08-07 ENCOUNTER — OFFICE VISIT (OUTPATIENT)
Dept: PULMONOLOGY | Age: 74
End: 2024-08-07
Payer: MEDICARE

## 2024-08-07 VITALS
SYSTOLIC BLOOD PRESSURE: 131 MMHG | BODY MASS INDEX: 31.15 KG/M2 | TEMPERATURE: 97.8 F | WEIGHT: 230 LBS | HEIGHT: 72 IN | DIASTOLIC BLOOD PRESSURE: 89 MMHG | HEART RATE: 89 BPM | OXYGEN SATURATION: 97 %

## 2024-08-07 DIAGNOSIS — J96.11 CHRONIC RESPIRATORY FAILURE WITH HYPOXIA (HCC): Primary | ICD-10-CM

## 2024-08-07 DIAGNOSIS — J44.9 VERY SEVERE CHRONIC OBSTRUCTIVE PULMONARY DISEASE (HCC): ICD-10-CM

## 2024-08-07 DIAGNOSIS — I25.10 CORONARY ARTERY DISEASE INVOLVING NATIVE CORONARY ARTERY OF NATIVE HEART WITHOUT ANGINA PECTORIS: ICD-10-CM

## 2024-08-07 DIAGNOSIS — Z87.891 HISTORY OF SMOKING: ICD-10-CM

## 2024-08-07 PROCEDURE — 1123F ACP DISCUSS/DSCN MKR DOCD: CPT | Performed by: INTERNAL MEDICINE

## 2024-08-07 PROCEDURE — 3075F SYST BP GE 130 - 139MM HG: CPT | Performed by: INTERNAL MEDICINE

## 2024-08-07 PROCEDURE — G8427 DOCREV CUR MEDS BY ELIG CLIN: HCPCS | Performed by: INTERNAL MEDICINE

## 2024-08-07 PROCEDURE — 3079F DIAST BP 80-89 MM HG: CPT | Performed by: INTERNAL MEDICINE

## 2024-08-07 PROCEDURE — G8417 CALC BMI ABV UP PARAM F/U: HCPCS | Performed by: INTERNAL MEDICINE

## 2024-08-07 PROCEDURE — 3017F COLORECTAL CA SCREEN DOC REV: CPT | Performed by: INTERNAL MEDICINE

## 2024-08-07 PROCEDURE — 3023F SPIROM DOC REV: CPT | Performed by: INTERNAL MEDICINE

## 2024-08-07 PROCEDURE — 99204 OFFICE O/P NEW MOD 45 MIN: CPT | Performed by: INTERNAL MEDICINE

## 2024-08-07 PROCEDURE — 4004F PT TOBACCO SCREEN RCVD TLK: CPT | Performed by: INTERNAL MEDICINE

## 2024-08-07 RX ORDER — ALBUTEROL SULFATE 90 UG/1
2 AEROSOL, METERED RESPIRATORY (INHALATION) 4 TIMES DAILY PRN
Qty: 18 G | Refills: 11 | Status: SHIPPED | OUTPATIENT
Start: 2024-08-07

## 2024-08-07 ASSESSMENT — ENCOUNTER SYMPTOMS
WHEEZING: 1
SHORTNESS OF BREATH: 1
ABDOMINAL DISTENTION: 0
RHINORRHEA: 0
COUGH: 0
CHEST TIGHTNESS: 1
ABDOMINAL PAIN: 0
ANAL BLEEDING: 0
BACK PAIN: 0
APNEA: 0

## 2024-08-07 NOTE — PROGRESS NOTES
shortness of breath and wheezing. Negative for apnea and cough.    Gastrointestinal:  Negative for abdominal distention, abdominal pain and anal bleeding.   Endocrine: Negative for cold intolerance, heat intolerance and polydipsia.   Genitourinary:  Negative for difficulty urinating, dysuria, enuresis and flank pain.   Musculoskeletal:  Negative for arthralgias, back pain and gait problem.   Allergic/Immunologic: Negative for environmental allergies.   Neurological:  Negative for dizziness, facial asymmetry, light-headedness and headaches.       Vitals:    08/07/24 1457   BP: 131/89   Pulse: 89   Temp: 97.8 °F (36.6 °C)   SpO2: 97%   Weight: 104.3 kg (230 lb)   Height: 1.829 m (6')      BMI Readings from Last 1 Encounters:   08/07/24 31.19 kg/m²         Physical Exam  Vitals reviewed.   Constitutional:       Appearance: Normal appearance.   HENT:      Head: Normocephalic and atraumatic.      Nose: Nose normal.   Eyes:      Extraocular Movements: Extraocular movements intact.      Conjunctiva/sclera: Conjunctivae normal.   Cardiovascular:      Rate and Rhythm: Normal rate and regular rhythm.      Heart sounds: No murmur heard.     No friction rub.   Pulmonary:      Effort: Pulmonary effort is normal. No respiratory distress.      Breath sounds: Normal breath sounds. No stridor. No wheezing, rhonchi or rales.   Abdominal:      General: There is no distension.      Palpations: There is no mass.      Tenderness: There is no abdominal tenderness. There is no guarding or rebound.   Musculoskeletal:      Cervical back: Normal range of motion and neck supple.   Neurological:      Mental Status: He is alert and oriented to person, place, and time.             This note was generated using a voice recognition software. Errors in voice recognition may have occurred.      An electronic signature was used to authenticate this note.    --Beth Salcedo MD

## 2024-09-22 DIAGNOSIS — I25.10 CORONARY ARTERY DISEASE INVOLVING NATIVE CORONARY ARTERY OF NATIVE HEART WITHOUT ANGINA PECTORIS: ICD-10-CM

## 2024-09-23 RX ORDER — METOPROLOL SUCCINATE 25 MG/1
TABLET, EXTENDED RELEASE ORAL
Qty: 90 TABLET | Refills: 1 | Status: SHIPPED | OUTPATIENT
Start: 2024-09-23

## 2024-09-23 RX ORDER — LISINOPRIL 5 MG/1
TABLET ORAL
Qty: 90 TABLET | Refills: 1 | Status: SHIPPED | OUTPATIENT
Start: 2024-09-23

## 2024-09-23 RX ORDER — FUROSEMIDE 20 MG
TABLET ORAL
Qty: 90 TABLET | Refills: 3 | Status: SHIPPED | OUTPATIENT
Start: 2024-09-23

## 2024-09-23 RX ORDER — CLOPIDOGREL BISULFATE 75 MG/1
TABLET ORAL
Qty: 90 TABLET | Refills: 3 | Status: SHIPPED | OUTPATIENT
Start: 2024-09-23

## 2024-10-10 ENCOUNTER — OFFICE VISIT (OUTPATIENT)
Dept: CARDIOLOGY CLINIC | Age: 74
End: 2024-10-10
Payer: MEDICARE

## 2024-10-10 VITALS
DIASTOLIC BLOOD PRESSURE: 80 MMHG | HEART RATE: 110 BPM | HEIGHT: 72 IN | BODY MASS INDEX: 29.8 KG/M2 | WEIGHT: 220 LBS | SYSTOLIC BLOOD PRESSURE: 112 MMHG

## 2024-10-10 DIAGNOSIS — R06.02 SHORTNESS OF BREATH: ICD-10-CM

## 2024-10-10 DIAGNOSIS — E78.2 MIXED HYPERLIPIDEMIA: ICD-10-CM

## 2024-10-10 DIAGNOSIS — Z95.5 H/O HEART ARTERY STENT: ICD-10-CM

## 2024-10-10 DIAGNOSIS — I25.10 CORONARY ARTERY DISEASE INVOLVING NATIVE CORONARY ARTERY OF NATIVE HEART WITHOUT ANGINA PECTORIS: Primary | ICD-10-CM

## 2024-10-10 DIAGNOSIS — I10 ESSENTIAL HYPERTENSION: ICD-10-CM

## 2024-10-10 PROCEDURE — 4004F PT TOBACCO SCREEN RCVD TLK: CPT | Performed by: INTERNAL MEDICINE

## 2024-10-10 PROCEDURE — 3079F DIAST BP 80-89 MM HG: CPT | Performed by: INTERNAL MEDICINE

## 2024-10-10 PROCEDURE — 1123F ACP DISCUSS/DSCN MKR DOCD: CPT | Performed by: INTERNAL MEDICINE

## 2024-10-10 PROCEDURE — 3017F COLORECTAL CA SCREEN DOC REV: CPT | Performed by: INTERNAL MEDICINE

## 2024-10-10 PROCEDURE — G8417 CALC BMI ABV UP PARAM F/U: HCPCS | Performed by: INTERNAL MEDICINE

## 2024-10-10 PROCEDURE — G8484 FLU IMMUNIZE NO ADMIN: HCPCS | Performed by: INTERNAL MEDICINE

## 2024-10-10 PROCEDURE — 99213 OFFICE O/P EST LOW 20 MIN: CPT | Performed by: INTERNAL MEDICINE

## 2024-10-10 PROCEDURE — 3074F SYST BP LT 130 MM HG: CPT | Performed by: INTERNAL MEDICINE

## 2024-10-10 PROCEDURE — G8427 DOCREV CUR MEDS BY ELIG CLIN: HCPCS | Performed by: INTERNAL MEDICINE

## 2024-10-10 ASSESSMENT — ENCOUNTER SYMPTOMS
SHORTNESS OF BREATH: 0
EYES NEGATIVE: 1
DIARRHEA: 0
GASTROINTESTINAL NEGATIVE: 1
RESPIRATORY NEGATIVE: 1
NAUSEA: 0
VOMITING: 0

## 2024-10-10 NOTE — PROGRESS NOTES
[facility]                            Date:  10/10/2024  Patient: Alejandro Reyes  Age:  74 y.o., 1950      Reason for evaluation:         SUBJECTIVE:    Returns today follow-up assessment coronary artery disease previous stent shortness of breath hypertension hyperlipidemia overall doing reasonably well from a cardiac standpoint.  His main difficulty is shortness of breath likely related to underlying significant COPD and has been told there is not really much else that can be done he has quit smoking.  Has not taken any sublingual nitroglycerin recently.  No other complaints or issues reported.  Blood pressure 112/80 heart rate 110.    Review of Systems   Constitutional: Negative.  Negative for chills, fever and unexpected weight change.   HENT: Negative.     Eyes: Negative.    Respiratory: Negative.  Negative for shortness of breath.    Cardiovascular: Negative.  Negative for chest pain.   Gastrointestinal: Negative.  Negative for diarrhea, nausea and vomiting.   Endocrine: Negative.    Genitourinary: Negative.    Musculoskeletal: Negative.    Skin: Negative.    Neurological: Negative.    All other systems reviewed and are negative.        OBJECTIVE:    /80   Pulse (!) 110   Ht 1.829 m (6')   Wt 99.8 kg (220 lb)   BMI 29.84 kg/m²     Labs:   CBC: No results for input(s): \"WBC\", \"HGB\", \"HCT\", \"PLT\" in the last 72 hours.  BMP:No results for input(s): \"NA\", \"K\", \"CO2\", \"BUN\", \"CREATININE\", \"LABGLOM\", \"GLUCOSE\" in the last 72 hours.  BNP: No results for input(s): \"BNP\" in the last 72 hours.  PT/INR: No results for input(s): \"PROTIME\", \"INR\" in the last 72 hours.  APTT:No results for input(s): \"APTT\" in the last 72 hours.  CARDIAC ENZYMES:No results for input(s): \"CKTOTAL\", \"CKMB\", \"CKMBINDEX\", \"TROPONINI\" in the last 72 hours.  FASTING LIPID PANEL:  Lab Results   Component Value Date/Time    HDL 43 02/22/2021 03:14 PM    TRIG 106 02/22/2021 03:14 PM     LIVER PROFILE:No

## 2025-02-10 ENCOUNTER — OFFICE VISIT (OUTPATIENT)
Dept: PULMONOLOGY | Age: 75
End: 2025-02-10
Payer: MEDICARE

## 2025-02-10 VITALS
HEART RATE: 104 BPM | BODY MASS INDEX: 29.53 KG/M2 | RESPIRATION RATE: 20 BRPM | TEMPERATURE: 98.3 F | SYSTOLIC BLOOD PRESSURE: 120 MMHG | HEIGHT: 72 IN | WEIGHT: 218 LBS | OXYGEN SATURATION: 96 % | DIASTOLIC BLOOD PRESSURE: 67 MMHG

## 2025-02-10 DIAGNOSIS — J44.9 VERY SEVERE CHRONIC OBSTRUCTIVE PULMONARY DISEASE (HCC): Primary | ICD-10-CM

## 2025-02-10 DIAGNOSIS — I25.10 CORONARY ARTERY DISEASE INVOLVING NATIVE CORONARY ARTERY OF NATIVE HEART WITHOUT ANGINA PECTORIS: ICD-10-CM

## 2025-02-10 DIAGNOSIS — Z87.891 HISTORY OF SMOKING: ICD-10-CM

## 2025-02-10 DIAGNOSIS — J96.11 CHRONIC RESPIRATORY FAILURE WITH HYPOXIA: ICD-10-CM

## 2025-02-10 PROCEDURE — 99214 OFFICE O/P EST MOD 30 MIN: CPT | Performed by: INTERNAL MEDICINE

## 2025-02-10 PROCEDURE — G8417 CALC BMI ABV UP PARAM F/U: HCPCS | Performed by: INTERNAL MEDICINE

## 2025-02-10 PROCEDURE — 3078F DIAST BP <80 MM HG: CPT | Performed by: INTERNAL MEDICINE

## 2025-02-10 PROCEDURE — 1123F ACP DISCUSS/DSCN MKR DOCD: CPT | Performed by: INTERNAL MEDICINE

## 2025-02-10 PROCEDURE — 3074F SYST BP LT 130 MM HG: CPT | Performed by: INTERNAL MEDICINE

## 2025-02-10 PROCEDURE — 4004F PT TOBACCO SCREEN RCVD TLK: CPT | Performed by: INTERNAL MEDICINE

## 2025-02-10 PROCEDURE — 3017F COLORECTAL CA SCREEN DOC REV: CPT | Performed by: INTERNAL MEDICINE

## 2025-02-10 PROCEDURE — 3023F SPIROM DOC REV: CPT | Performed by: INTERNAL MEDICINE

## 2025-02-10 PROCEDURE — G8427 DOCREV CUR MEDS BY ELIG CLIN: HCPCS | Performed by: INTERNAL MEDICINE

## 2025-02-10 RX ORDER — OFLOXACIN 3 MG/ML
SOLUTION/ DROPS OPHTHALMIC
COMMUNITY

## 2025-02-10 ASSESSMENT — ENCOUNTER SYMPTOMS
COUGH: 1
ABDOMINAL PAIN: 0
WHEEZING: 1
APNEA: 0
SHORTNESS OF BREATH: 1
ANAL BLEEDING: 0
BACK PAIN: 0
CHEST TIGHTNESS: 0
ABDOMINAL DISTENTION: 0
RHINORRHEA: 0

## 2025-02-10 NOTE — PROGRESS NOTES
Negative for dizziness, facial asymmetry, light-headedness and headaches.       Vitals:    02/10/25 0948   BP: 120/67   Pulse: (!) 104   Resp: 20   Temp: 98.3 °F (36.8 °C)   SpO2: 96%   Weight: 98.9 kg (218 lb)   Height: 1.829 m (6')      BMI Readings from Last 1 Encounters:   02/10/25 29.57 kg/m²         Physical Exam  Vitals reviewed.   Constitutional:       Appearance: Normal appearance.   HENT:      Head: Normocephalic and atraumatic.      Nose: Nose normal.   Eyes:      Extraocular Movements: Extraocular movements intact.      Conjunctiva/sclera: Conjunctivae normal.   Cardiovascular:      Rate and Rhythm: Normal rate and regular rhythm.      Heart sounds: No murmur heard.     No friction rub.   Pulmonary:      Effort: Pulmonary effort is normal. No respiratory distress.      Breath sounds: Normal breath sounds. No stridor. No wheezing, rhonchi or rales.   Abdominal:      General: There is no distension.      Palpations: There is no mass.      Tenderness: There is no abdominal tenderness. There is no guarding or rebound.   Musculoskeletal:      Cervical back: Normal range of motion and neck supple.   Neurological:      Mental Status: He is alert and oriented to person, place, and time.             This note was generated using a voice recognition software. Errors in voice recognition may have occurred.      An electronic signature was used to authenticate this note.    --Beth Salcedo MD

## 2025-04-14 ENCOUNTER — OFFICE VISIT (OUTPATIENT)
Dept: CARDIOLOGY CLINIC | Age: 75
End: 2025-04-14
Payer: MEDICARE

## 2025-04-14 VITALS
HEART RATE: 118 BPM | WEIGHT: 233 LBS | BODY MASS INDEX: 31.56 KG/M2 | DIASTOLIC BLOOD PRESSURE: 86 MMHG | HEIGHT: 72 IN | SYSTOLIC BLOOD PRESSURE: 132 MMHG

## 2025-04-14 DIAGNOSIS — R07.9 CHEST PAIN, UNSPECIFIED TYPE: ICD-10-CM

## 2025-04-14 DIAGNOSIS — R55 SYNCOPE AND COLLAPSE: ICD-10-CM

## 2025-04-14 DIAGNOSIS — Z95.5 H/O HEART ARTERY STENT: ICD-10-CM

## 2025-04-14 DIAGNOSIS — I10 ESSENTIAL HYPERTENSION: ICD-10-CM

## 2025-04-14 DIAGNOSIS — E78.2 MIXED HYPERLIPIDEMIA: ICD-10-CM

## 2025-04-14 DIAGNOSIS — R06.02 SHORTNESS OF BREATH: Primary | ICD-10-CM

## 2025-04-14 DIAGNOSIS — I25.10 CORONARY ARTERY DISEASE INVOLVING NATIVE CORONARY ARTERY OF NATIVE HEART WITHOUT ANGINA PECTORIS: ICD-10-CM

## 2025-04-14 PROCEDURE — 3079F DIAST BP 80-89 MM HG: CPT | Performed by: INTERNAL MEDICINE

## 2025-04-14 PROCEDURE — 3017F COLORECTAL CA SCREEN DOC REV: CPT | Performed by: INTERNAL MEDICINE

## 2025-04-14 PROCEDURE — 93000 ELECTROCARDIOGRAM COMPLETE: CPT | Performed by: INTERNAL MEDICINE

## 2025-04-14 PROCEDURE — G8417 CALC BMI ABV UP PARAM F/U: HCPCS | Performed by: INTERNAL MEDICINE

## 2025-04-14 PROCEDURE — 99213 OFFICE O/P EST LOW 20 MIN: CPT | Performed by: INTERNAL MEDICINE

## 2025-04-14 PROCEDURE — 1123F ACP DISCUSS/DSCN MKR DOCD: CPT | Performed by: INTERNAL MEDICINE

## 2025-04-14 PROCEDURE — 3075F SYST BP GE 130 - 139MM HG: CPT | Performed by: INTERNAL MEDICINE

## 2025-04-14 PROCEDURE — G8427 DOCREV CUR MEDS BY ELIG CLIN: HCPCS | Performed by: INTERNAL MEDICINE

## 2025-04-14 PROCEDURE — 4004F PT TOBACCO SCREEN RCVD TLK: CPT | Performed by: INTERNAL MEDICINE

## 2025-04-14 ASSESSMENT — ENCOUNTER SYMPTOMS
SHORTNESS OF BREATH: 0
RESPIRATORY NEGATIVE: 1
DIARRHEA: 0
NAUSEA: 0
VOMITING: 0
EYES NEGATIVE: 1
GASTROINTESTINAL NEGATIVE: 1

## 2025-04-14 NOTE — PROGRESS NOTES
[facility]                            Date:  4/14/2025  Patient: Alejandro Reyes  Age:  75 y.o., 1950      Reason for evaluation:         SUBJECTIVE:    Seen today here for follow-up coronary artery disease hypertension hyperlipidemia chronic shortness of breath previous stent syncope apparently has advanced COPD on oxygen 24 hours daily he can barely walk from the wheelchair to a door a few feet away.  Denies anginal chest pain he takes nitroglycerin occasionally but not recently.  Dyspnea is about the same.  No other complaints or issues reported blood pressure 132/86 heart rate 118.  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    Review of Systems   Constitutional: Negative.  Negative for chills, fever and unexpected weight change.   HENT: Negative.     Eyes: Negative.    Respiratory: Negative.  Negative for shortness of breath.    Cardiovascular: Negative.  Negative for chest pain.   Gastrointestinal: Negative.  Negative for diarrhea, nausea and vomiting.   Endocrine: Negative.    Genitourinary: Negative.    Musculoskeletal: Negative.    Skin: Negative.    Neurological: Negative.    All other systems reviewed and are negative.        OBJECTIVE:    /86   Pulse (!) 118   Ht 1.829 m (6')   Wt 105.7 kg (233 lb)   BMI 31.60 kg/m²     Labs:   CBC: No results for input(s): \"WBC\", \"HGB\", \"HCT\", \"PLT\" in the last 72 hours.  BMP:No results for input(s): \"NA\", \"K\", \"CO2\", \"BUN\", \"CREATININE\", \"LABGLOM\", \"GLUCOSE\" in the last 72 hours.  BNP: No results for input(s): \"BNP\" in the last 72 hours.  PT/INR: No results for input(s): \"PROTIME\", \"INR\" in the last 72 hours.  APTT:No results for input(s): \"APTT\" in the last 72 hours.  CARDIAC ENZYMES:No results for input(s): \"CKTOTAL\", \"CKMB\", \"CKMBINDEX\", \"TROPONINI\" in the last 72 hours.  FASTING LIPID PANEL:  Lab Results   Component Value Date/Time    HDL 43 02/22/2021 03:14 PM    TRIG